# Patient Record
Sex: FEMALE | Race: BLACK OR AFRICAN AMERICAN | NOT HISPANIC OR LATINO | Employment: OTHER | URBAN - METROPOLITAN AREA
[De-identification: names, ages, dates, MRNs, and addresses within clinical notes are randomized per-mention and may not be internally consistent; named-entity substitution may affect disease eponyms.]

---

## 2019-05-28 ENCOUNTER — HOSPITAL ENCOUNTER (EMERGENCY)
Facility: HOSPITAL | Age: 29
Discharge: HOME | End: 2019-05-29
Attending: EMERGENCY MEDICINE
Payer: MEDICAID

## 2019-05-28 DIAGNOSIS — H10.33 ACUTE CONJUNCTIVITIS OF BOTH EYES, UNSPECIFIED ACUTE CONJUNCTIVITIS TYPE: Primary | ICD-10-CM

## 2019-05-28 DIAGNOSIS — H40.9 GLAUCOMA OF BOTH EYES, UNSPECIFIED GLAUCOMA TYPE: ICD-10-CM

## 2019-05-28 PROCEDURE — 25000003 PHARM REV CODE 250: Performed by: EMERGENCY MEDICINE

## 2019-05-28 PROCEDURE — 99283 EMERGENCY DEPT VISIT LOW MDM: CPT

## 2019-05-28 PROCEDURE — 63600175 PHARM REV CODE 636 W HCPCS: Performed by: EMERGENCY MEDICINE

## 2019-05-28 RX ORDER — MIDAZOLAM HYDROCHLORIDE 1 MG/ML
10 INJECTION INTRAMUSCULAR; INTRAVENOUS
Status: DISCONTINUED | OUTPATIENT
Start: 2019-05-28 | End: 2019-05-28

## 2019-05-28 RX ORDER — DIPHENHYDRAMINE HCL 12.5MG/5ML
12.5 ELIXIR ORAL
Status: DISCONTINUED | OUTPATIENT
Start: 2019-05-28 | End: 2019-05-29 | Stop reason: HOSPADM

## 2019-05-28 RX ORDER — MIDAZOLAM HYDROCHLORIDE 1 MG/ML
INJECTION INTRAMUSCULAR; INTRAVENOUS
Status: DISCONTINUED
Start: 2019-05-28 | End: 2019-05-29 | Stop reason: HOSPADM

## 2019-05-28 RX ORDER — DIPHENHYDRAMINE HCL 12.5MG/5ML
50 ELIXIR ORAL ONCE
Status: COMPLETED | OUTPATIENT
Start: 2019-05-28 | End: 2019-05-28

## 2019-05-28 RX ORDER — MIDAZOLAM HYDROCHLORIDE 1 MG/ML
10 INJECTION INTRAMUSCULAR; INTRAVENOUS ONCE
Status: COMPLETED | OUTPATIENT
Start: 2019-05-28 | End: 2019-05-28

## 2019-05-28 RX ADMIN — DIPHENHYDRAMINE HYDROCHLORIDE 50 MG: 12.5 SOLUTION ORAL at 11:05

## 2019-05-28 RX ADMIN — MIDAZOLAM HYDROCHLORIDE 10 MG: 1 INJECTION, SOLUTION INTRAMUSCULAR; INTRAVENOUS at 11:05

## 2019-05-28 RX ADMIN — FLUORESCEIN SODIUM 1 EACH: 1 STRIP OPHTHALMIC at 11:05

## 2019-05-29 VITALS
WEIGHT: 120 LBS | DIASTOLIC BLOOD PRESSURE: 75 MMHG | HEART RATE: 75 BPM | OXYGEN SATURATION: 95 % | RESPIRATION RATE: 20 BRPM | SYSTOLIC BLOOD PRESSURE: 129 MMHG | BODY MASS INDEX: 23.44 KG/M2 | TEMPERATURE: 99 F

## 2019-05-29 NOTE — ED PROVIDER NOTES
Encounter Date: 5/28/2019    SCRIBE #1 NOTE: I, Elbert Mccormack , am scribing for, and in the presence of,  Russell ADAIR) . I have scribed the following portions of the note - Other sections scribed: HPI, ROS, PE, ED Management.       History     Chief Complaint   Patient presents with    Facial Swelling     pt from [facility] sent for right facial swelling and redness      Patient is a 28 year old female with a PMHx of Cerebral Palsy, Seizures, and Spina Bifida aperta who presents to the ED via EMS due to facial swelling that began today. Patient was sent from [facility] for the facial swelling and redness. Patient has left eye redness as well. Patient has had similar symptoms in the past and have been associated with allergies. Most recent episode was about one month ago.    Dr. Krishnan from [facility] recommended that the patient be evaluated in the ED. Patients father and EMS serves as historian.     The history is provided by a parent and the EMS personnel. The history is limited by a developmental delay.     Review of patient's allergies indicates:  No Known Allergies  Past Medical History:   Diagnosis Date    ADHD (attention deficit hyperactivity disorder)     Blood transfusion     Cerebral palsy     Congenital hip dislocation     Encephalopathy     Infantile myoclonic epilepsy     Iron deficiency anemia     Mental retardation     Pneumonia, aspiration     Seizures     Spina bifida aperta      Past Surgical History:   Procedure Laterality Date    ABDOMINAL SURGERY      BRAIN SURGERY      GASTROSTOMY      PEG tube    HIP SURGERY      JOINT REPLACEMENT       No family history on file.  Social History     Tobacco Use    Smoking status: Never Smoker   Substance Use Topics    Alcohol use: No    Drug use: No     Review of Systems   Unable to perform ROS: Other   HENT: Positive for facial swelling and sneezing.    Eyes: Positive for redness.   Patient's PMHx of     Physical Exam      Initial Vitals [05/28/19 2209]   BP Pulse Resp Temp SpO2   98/62 98 20 98.2 °F (36.8 °C) 96 %      MAP       --         Physical Exam    Nursing note and vitals reviewed.  Constitutional: She is not diaphoretic. No distress.   HENT:   Head: Normocephalic and atraumatic.   Right Ear: External ear normal.   Left Ear: External ear normal.   Mouth/Throat: Oropharynx is clear and moist.   Eyes: EOM are normal. Pupils are equal, round, and reactive to light. Right eye exhibits no discharge. Left eye exhibits no discharge. No scleral icterus.   Bilateral conjunctival injection appreciated with limbic sparing   Neck: Normal range of motion.   Cardiovascular: Normal rate and regular rhythm.   No murmur heard.  Abdominal: Soft. Bowel sounds are normal.   Musculoskeletal: Normal range of motion. She exhibits no edema.   Neurological: She is alert.         ED Course   Procedures  Labs Reviewed - No data to display       Imaging Results          CT Maxillofacial Without Contrast (In process)                  Medical Decision Making:   History:   Old Medical Records: I decided to obtain old medical records.  Initial Assessment:   28-year-old female with history of severe MR presenting with left eye redness, bilateral conjunctiva injection, swelling of his left eye.  Patient noted to be continually rubbing her nose.  Some concern for allergies on the patient's parents.  Patient sedated to further for my exam with intranasal Versed.  Monitored without significant evidence of desaturation.  No evidence of corneal abrasion in either eye.  Pressures with Arturo-Pen reveals slightly elevated pressures, 22 and right, 25 and left.  Unclear significance given that patient was mildly uncooperative with exam even under sedation.  Differential includes allergic conjunctivitis, versus viral conjunctivitis.  I have less of a concern for glaucoma, though pressures are noted to be elevated.  Discussed need to follow up with Ophthalmology in 1  week.  CT scan negative, doubt significant cellulitis.  Trauma also a possibility given that the patient continually rubs nose and baseline discoordination may make her rub her eyes as well. Otherwise believe she is safe for discharge home.  Discussed need to follow up with Ophthalmology as well as strict return precautions.  Discussed use of Zyrtec to help with symptoms.    Prescription for Zyrtec and erythromycin ointment faxed to nursing home as it was not printed prior to her discharge.  ED Management:  22:21: Will prescribe Zyrtec and Benadryl for the patients allergies. Will assess the patients left eye for corneal abrasions.            Scribe Attestation:   Scribe #1: I performed the above scribed service and the documentation accurately describes the services I performed. I attest to the accuracy of the note.               Clinical Impression:   No diagnosis found.                             Russell Starr MD  05/29/19 8014

## 2019-05-29 NOTE — DISCHARGE INSTRUCTIONS
You were seen in the emergency department for eye redness and swelling. Your exam and CT scan is reassuring.  This could be caused by many things including allergies.  In addition, your eye pressure was noted to be somewhat elevated.  The lowest pressure was 22 in your right eye and 25 in your left eye.  It is very important you see an eye doctor within 1 week.  Please call the Ophthalmology number on this sheet to schedule appointment.  Please return for any new or worsening pain, swelling, fevers, inability to open her eyes, changes in vision, severe crusting, or you become concerned in any other way.

## 2020-10-11 ENCOUNTER — HOSPITAL ENCOUNTER (INPATIENT)
Facility: HOSPITAL | Age: 30
LOS: 12 days | Discharge: HOME | DRG: 177 | End: 2020-10-23
Attending: EMERGENCY MEDICINE | Admitting: FAMILY MEDICINE
Payer: MEDICAID

## 2020-10-11 DIAGNOSIS — U07.1 PNEUMONIA DUE TO COVID-19 VIRUS: Primary | ICD-10-CM

## 2020-10-11 DIAGNOSIS — J12.82 PNEUMONIA DUE TO COVID-19 VIRUS: Primary | ICD-10-CM

## 2020-10-11 DIAGNOSIS — K94.23 PEG TUBE MALFUNCTION: ICD-10-CM

## 2020-10-11 DIAGNOSIS — E87.0 HYPERNATREMIA: ICD-10-CM

## 2020-10-11 DIAGNOSIS — R78.81 POSITIVE BLOOD CULTURE: ICD-10-CM

## 2020-10-11 DIAGNOSIS — E44.0 MODERATE MALNUTRITION: ICD-10-CM

## 2020-10-11 DIAGNOSIS — R00.0 SINUS TACHYCARDIA: ICD-10-CM

## 2020-10-11 DIAGNOSIS — G80.9 CEREBRAL PALSY: ICD-10-CM

## 2020-10-11 DIAGNOSIS — R09.02 HYPOXIA: ICD-10-CM

## 2020-10-11 DIAGNOSIS — T68.XXXA HYPOTHERMIA, INITIAL ENCOUNTER: ICD-10-CM

## 2020-10-11 DIAGNOSIS — G80.9 CEREBRAL PALSY, UNSPECIFIED TYPE: ICD-10-CM

## 2020-10-11 LAB
ALBUMIN SERPL BCP-MCNC: 2.6 G/DL (ref 3.5–5.2)
ALP SERPL-CCNC: 318 U/L (ref 55–135)
ALT SERPL W/O P-5'-P-CCNC: 146 U/L (ref 10–44)
ANION GAP SERPL CALC-SCNC: 13 MMOL/L (ref 8–16)
AST SERPL-CCNC: 152 U/L (ref 10–40)
BACTERIA #/AREA URNS HPF: NORMAL /HPF
BASOPHILS # BLD AUTO: 0.01 K/UL (ref 0–0.2)
BASOPHILS NFR BLD: 0.2 % (ref 0–1.9)
BILIRUB SERPL-MCNC: 0.2 MG/DL (ref 0.1–1)
BILIRUB UR QL STRIP: NEGATIVE
BUN SERPL-MCNC: 20 MG/DL (ref 6–20)
CALCIUM SERPL-MCNC: 8.7 MG/DL (ref 8.7–10.5)
CHLORIDE SERPL-SCNC: 113 MMOL/L (ref 95–110)
CK SERPL-CCNC: 95 U/L (ref 20–180)
CLARITY UR: CLEAR
CO2 SERPL-SCNC: 26 MMOL/L (ref 23–29)
COLOR UR: YELLOW
CREAT SERPL-MCNC: 0.7 MG/DL (ref 0.5–1.4)
CRP SERPL-MCNC: 182.8 MG/L (ref 0–8.2)
CTP QC/QA: YES
CTP QC/QA: YES
D DIMER PPP IA.FEU-MCNC: 2.1 MG/L FEU
DIFFERENTIAL METHOD: ABNORMAL
EOSINOPHIL # BLD AUTO: 0 K/UL (ref 0–0.5)
EOSINOPHIL NFR BLD: 0 % (ref 0–8)
ERYTHROCYTE [DISTWIDTH] IN BLOOD BY AUTOMATED COUNT: 12.9 % (ref 11.5–14.5)
EST. GFR  (AFRICAN AMERICAN): >60 ML/MIN/1.73 M^2
EST. GFR  (NON AFRICAN AMERICAN): >60 ML/MIN/1.73 M^2
FERRITIN SERPL-MCNC: 1000 NG/ML (ref 20–300)
GLUCOSE SERPL-MCNC: 235 MG/DL (ref 70–110)
GLUCOSE UR QL STRIP: ABNORMAL
HCT VFR BLD AUTO: 33.8 % (ref 37–48.5)
HGB BLD-MCNC: 11 G/DL (ref 12–16)
HGB UR QL STRIP: NEGATIVE
HYALINE CASTS #/AREA URNS LPF: 0 /LPF
IMM GRANULOCYTES # BLD AUTO: 0.02 K/UL (ref 0–0.04)
IMM GRANULOCYTES NFR BLD AUTO: 0.5 % (ref 0–0.5)
KETONES UR QL STRIP: NEGATIVE
LACTATE SERPL-SCNC: 1 MMOL/L (ref 0.5–2.2)
LACTATE SERPL-SCNC: 1.6 MMOL/L (ref 0.5–2.2)
LEUKOCYTE ESTERASE UR QL STRIP: NEGATIVE
LYMPHOCYTES # BLD AUTO: 0.7 K/UL (ref 1–4.8)
LYMPHOCYTES NFR BLD: 15.4 % (ref 18–48)
MCH RBC QN AUTO: 29.9 PG (ref 27–31)
MCHC RBC AUTO-ENTMCNC: 32.5 G/DL (ref 32–36)
MCV RBC AUTO: 92 FL (ref 82–98)
MICROSCOPIC COMMENT: NORMAL
MONOCYTES # BLD AUTO: 0.2 K/UL (ref 0.3–1)
MONOCYTES NFR BLD: 4 % (ref 4–15)
NEUTROPHILS # BLD AUTO: 3.4 K/UL (ref 1.8–7.7)
NEUTROPHILS NFR BLD: 79.9 % (ref 38–73)
NITRITE UR QL STRIP: NEGATIVE
NRBC BLD-RTO: 0 /100 WBC
PH UR STRIP: 6 [PH] (ref 5–8)
PLATELET # BLD AUTO: 122 K/UL (ref 150–350)
PMV BLD AUTO: 12 FL (ref 9.2–12.9)
POC MOLECULAR INFLUENZA A AGN: NEGATIVE
POC MOLECULAR INFLUENZA B AGN: NEGATIVE
POTASSIUM SERPL-SCNC: 4.1 MMOL/L (ref 3.5–5.1)
PROCALCITONIN SERPL IA-MCNC: 0.62 NG/ML
PROT SERPL-MCNC: 7.7 G/DL (ref 6–8.4)
PROT UR QL STRIP: ABNORMAL
RBC # BLD AUTO: 3.68 M/UL (ref 4–5.4)
RBC #/AREA URNS HPF: 2 /HPF (ref 0–4)
SARS-COV-2 RDRP RESP QL NAA+PROBE: POSITIVE
SODIUM SERPL-SCNC: 152 MMOL/L (ref 136–145)
SP GR UR STRIP: 1.01 (ref 1–1.03)
SQUAMOUS #/AREA URNS HPF: NORMAL /HPF
URN SPEC COLLECT METH UR: ABNORMAL
UROBILINOGEN UR STRIP-ACNC: NEGATIVE EU/DL
WBC # BLD AUTO: 4.22 K/UL (ref 3.9–12.7)
WBC #/AREA URNS HPF: 3 /HPF (ref 0–5)
YEAST URNS QL MICRO: NORMAL

## 2020-10-11 PROCEDURE — 99223 PR INITIAL HOSPITAL CARE,LEVL III: ICD-10-PCS | Mod: AI,,, | Performed by: FAMILY MEDICINE

## 2020-10-11 PROCEDURE — 86140 C-REACTIVE PROTEIN: CPT

## 2020-10-11 PROCEDURE — 83605 ASSAY OF LACTIC ACID: CPT

## 2020-10-11 PROCEDURE — 85025 COMPLETE CBC W/AUTO DIFF WBC: CPT

## 2020-10-11 PROCEDURE — 81000 URINALYSIS NONAUTO W/SCOPE: CPT

## 2020-10-11 PROCEDURE — 93005 ELECTROCARDIOGRAM TRACING: CPT

## 2020-10-11 PROCEDURE — 63600175 PHARM REV CODE 636 W HCPCS: Performed by: FAMILY MEDICINE

## 2020-10-11 PROCEDURE — 84145 PROCALCITONIN (PCT): CPT

## 2020-10-11 PROCEDURE — 82550 ASSAY OF CK (CPK): CPT

## 2020-10-11 PROCEDURE — 25000003 PHARM REV CODE 250: Performed by: FAMILY MEDICINE

## 2020-10-11 PROCEDURE — U0002 COVID-19 LAB TEST NON-CDC: HCPCS | Performed by: EMERGENCY MEDICINE

## 2020-10-11 PROCEDURE — 87040 BLOOD CULTURE FOR BACTERIA: CPT | Mod: 59

## 2020-10-11 PROCEDURE — 96365 THER/PROPH/DIAG IV INF INIT: CPT

## 2020-10-11 PROCEDURE — 25000003 PHARM REV CODE 250: Performed by: EMERGENCY MEDICINE

## 2020-10-11 PROCEDURE — 96375 TX/PRO/DX INJ NEW DRUG ADDON: CPT

## 2020-10-11 PROCEDURE — 99285 EMERGENCY DEPT VISIT HI MDM: CPT | Mod: 25

## 2020-10-11 PROCEDURE — 99223 1ST HOSP IP/OBS HIGH 75: CPT | Mod: AI,,, | Performed by: FAMILY MEDICINE

## 2020-10-11 PROCEDURE — 80053 COMPREHEN METABOLIC PANEL: CPT

## 2020-10-11 PROCEDURE — 63600175 PHARM REV CODE 636 W HCPCS: Performed by: EMERGENCY MEDICINE

## 2020-10-11 PROCEDURE — 83605 ASSAY OF LACTIC ACID: CPT | Mod: 91

## 2020-10-11 PROCEDURE — 36415 COLL VENOUS BLD VENIPUNCTURE: CPT

## 2020-10-11 PROCEDURE — 20600001 HC STEP DOWN PRIVATE ROOM

## 2020-10-11 PROCEDURE — 85379 FIBRIN DEGRADATION QUANT: CPT

## 2020-10-11 PROCEDURE — 82728 ASSAY OF FERRITIN: CPT

## 2020-10-11 PROCEDURE — 93010 ELECTROCARDIOGRAM REPORT: CPT | Mod: ,,, | Performed by: INTERNAL MEDICINE

## 2020-10-11 PROCEDURE — 93010 EKG 12-LEAD: ICD-10-PCS | Mod: ,,, | Performed by: INTERNAL MEDICINE

## 2020-10-11 RX ORDER — BACLOFEN 10 MG/1
10 TABLET ORAL 3 TIMES DAILY
COMMUNITY

## 2020-10-11 RX ORDER — DEXAMETHASONE SODIUM PHOSPHATE 4 MG/ML
6 INJECTION, SOLUTION INTRA-ARTICULAR; INTRALESIONAL; INTRAMUSCULAR; INTRAVENOUS; SOFT TISSUE
Status: COMPLETED | OUTPATIENT
Start: 2020-10-11 | End: 2020-10-11

## 2020-10-11 RX ORDER — BACLOFEN 10 MG/1
10 TABLET ORAL 3 TIMES DAILY
Status: DISCONTINUED | OUTPATIENT
Start: 2020-10-11 | End: 2020-10-11

## 2020-10-11 RX ORDER — PANTOPRAZOLE SODIUM 40 MG/1
40 TABLET, DELAYED RELEASE ORAL DAILY
Status: DISCONTINUED | OUTPATIENT
Start: 2020-10-11 | End: 2020-10-11

## 2020-10-11 RX ORDER — ACETAMINOPHEN 650 MG/20.3ML
1000 LIQUID ORAL
Status: COMPLETED | OUTPATIENT
Start: 2020-10-11 | End: 2020-10-11

## 2020-10-11 RX ORDER — DIAZEPAM 10 MG/2G
20 GEL RECTAL
Status: DISCONTINUED | OUTPATIENT
Start: 2020-10-11 | End: 2020-10-23 | Stop reason: HOSPADM

## 2020-10-11 RX ORDER — TALC
3 POWDER (GRAM) TOPICAL NIGHTLY PRN
Status: DISCONTINUED | OUTPATIENT
Start: 2020-10-11 | End: 2020-10-23 | Stop reason: HOSPADM

## 2020-10-11 RX ORDER — SODIUM CHLORIDE 0.9 % (FLUSH) 0.9 %
10 SYRINGE (ML) INJECTION
Status: DISCONTINUED | OUTPATIENT
Start: 2020-10-11 | End: 2020-10-23 | Stop reason: HOSPADM

## 2020-10-11 RX ORDER — GLUCAGON 1 MG
1 KIT INJECTION
Status: DISCONTINUED | OUTPATIENT
Start: 2020-10-11 | End: 2020-10-23 | Stop reason: HOSPADM

## 2020-10-11 RX ORDER — LACTULOSE 10 G/15ML
200 SOLUTION ORAL; RECTAL 2 TIMES DAILY PRN
Status: DISCONTINUED | OUTPATIENT
Start: 2020-10-11 | End: 2020-10-23 | Stop reason: HOSPADM

## 2020-10-11 RX ORDER — CEFTRIAXONE 1 G/1
1 INJECTION, POWDER, FOR SOLUTION INTRAMUSCULAR; INTRAVENOUS
Status: COMPLETED | OUTPATIENT
Start: 2020-10-12 | End: 2020-10-14

## 2020-10-11 RX ORDER — ENOXAPARIN SODIUM 100 MG/ML
40 INJECTION SUBCUTANEOUS EVERY 24 HOURS
Status: DISCONTINUED | OUTPATIENT
Start: 2020-10-11 | End: 2020-10-23 | Stop reason: HOSPADM

## 2020-10-11 RX ORDER — LEVETIRACETAM 100 MG/ML
20 SOLUTION ORAL 2 TIMES DAILY
Status: DISCONTINUED | OUTPATIENT
Start: 2020-10-11 | End: 2020-10-23 | Stop reason: HOSPADM

## 2020-10-11 RX ORDER — LORATADINE 10 MG/1
10 TABLET ORAL DAILY
COMMUNITY

## 2020-10-11 RX ORDER — TALC
3 POWDER (GRAM) TOPICAL NIGHTLY PRN
Status: DISCONTINUED | OUTPATIENT
Start: 2020-10-11 | End: 2020-10-11

## 2020-10-11 RX ORDER — LEVETIRACETAM 100 MG/ML
20 SOLUTION ORAL 2 TIMES DAILY
Status: DISCONTINUED | OUTPATIENT
Start: 2020-10-11 | End: 2020-10-11

## 2020-10-11 RX ORDER — LACTULOSE 10 G/15ML
200 SOLUTION ORAL; RECTAL 3 TIMES DAILY
Status: DISCONTINUED | OUTPATIENT
Start: 2020-10-11 | End: 2020-10-11

## 2020-10-11 RX ORDER — DEXAMETHASONE SODIUM PHOSPHATE 4 MG/ML
6 INJECTION, SOLUTION INTRA-ARTICULAR; INTRALESIONAL; INTRAMUSCULAR; INTRAVENOUS; SOFT TISSUE EVERY 24 HOURS
Status: COMPLETED | OUTPATIENT
Start: 2020-10-12 | End: 2020-10-20

## 2020-10-11 RX ADMIN — AZITHROMYCIN MONOHYDRATE 500 MG: 500 INJECTION, POWDER, LYOPHILIZED, FOR SOLUTION INTRAVENOUS at 09:10

## 2020-10-11 RX ADMIN — ACETAMINOPHEN ORAL SOLUTION 999.01 MG: 650 SOLUTION ORAL at 07:10

## 2020-10-11 RX ADMIN — BACLOFEN 10 MG: 10 TABLET ORAL at 09:10

## 2020-10-11 RX ADMIN — LEVETIRACETAM 1088 MG: 100 SOLUTION ORAL at 09:10

## 2020-10-11 RX ADMIN — DEXAMETHASONE SODIUM PHOSPHATE 6 MG: 4 INJECTION INTRA-ARTICULAR; INTRALESIONAL; INTRAMUSCULAR; INTRAVENOUS; SOFT TISSUE at 09:10

## 2020-10-11 RX ADMIN — ENOXAPARIN SODIUM 40 MG: 40 INJECTION SUBCUTANEOUS at 04:10

## 2020-10-11 RX ADMIN — BACLOFEN 10 MG: 10 TABLET ORAL at 04:10

## 2020-10-11 RX ADMIN — REMDESIVIR 200 MG: 100 INJECTION, POWDER, LYOPHILIZED, FOR SOLUTION INTRAVENOUS at 06:10

## 2020-10-11 RX ADMIN — CEFTRIAXONE 1 G: 1 INJECTION, SOLUTION INTRAVENOUS at 07:10

## 2020-10-11 RX ADMIN — SODIUM CHLORIDE 1000 ML: 9 INJECTION, SOLUTION INTRAVENOUS at 07:10

## 2020-10-11 NOTE — H&P
Hospital Medicine  History and Physical  [facility]      Patient Name: Alexander Sainz  MRN:  [id number]  Hospital Medicine Team: [facility] R Augie Calvo MD  Date of Admission:  10/11/2020     Length of Stay:  LOS: 0 days     Principal Problem: Covid-19 Virus Infection    Chief complaint    Sob, fever    HPI    30yoF with severe MR, cerebral palsy, spina bifida, chronic transaminitis, chronic hypernatremia, lives at [facility], who presented to  ED with fever 102 at home, and hypoxemic resp failure with sats in the field 88%.  In ED, temp 99, Tachy 119, O2 sats 82% on RA and improved to 90s on 2-3L.  CXR with fairly dense infiltrate in RML - appears bacterial in nature.  Was Tx-ed for bacterial PNA with Rocephin and Azithro. Covid +  and called for transfer to [facility].  One dose of IV Dexa.    10/11 PM: seen in room once made it to covid floor. Father able to be in room s/t new visitor policy however father states he has not seen his daughter since around 2/2020 (?pandemic precautions) as she lives at [facility] and he is unsure of nutrition requirements through her PEG tube. Does not take anything po. Restraints placed as risk of pulling at tubes and oxygen.    Review of Systems    Unable to perform s/t patient's baseline mental status.    Past Medical History:   Diagnosis Date    ADHD (attention deficit hyperactivity disorder)     Blood transfusion     Cerebral palsy     Congenital hip dislocation     Encephalopathy     Infantile myoclonic epilepsy     Iron deficiency anemia     Mental retardation     Pneumonia, aspiration     Seizures     Spina bifida aperta      Past Surgical History:   Procedure Laterality Date    ABDOMINAL SURGERY      BRAIN SURGERY      GASTROSTOMY      PEG tube    HIP SURGERY      JOINT REPLACEMENT       History reviewed. No pertinent family history.  Social History     Socioeconomic History    Marital status: Single     Spouse name: Not on  file    Number of children: Not on file    Years of education: Not on file    Highest education level: Not on file   Occupational History    Not on file   Social Needs    Financial resource strain: Not on file    Food insecurity     Worry: Not on file     Inability: Not on file    Transportation needs     Medical: Not on file     Non-medical: Not on file   Tobacco Use    Smoking status: Never Smoker    Smokeless tobacco: Never Used   Substance and Sexual Activity    Alcohol use: No    Drug use: No    Sexual activity: Never   Lifestyle    Physical activity     Days per week: Not on file     Minutes per session: Not on file    Stress: Not on file   Relationships    Social connections     Talks on phone: Not on file     Gets together: Not on file     Attends [employer] service: Not on file     Active member of club or organization: Not on file     Attends meetings of clubs or organizations: Not on file     Relationship status: Not on file   Other Topics Concern    Not on file   Social History Narrative    Not on file       Medications  No current facility-administered medications on file prior to encounter.      Current Outpatient Medications on File Prior to Encounter   Medication Sig Dispense Refill    baclofen (LIORESAL) 10 MG tablet Take 10 mg by mouth 3 (three) times daily.      calcium-vitamin D (CALCIUM-VITAMIN D) 500 mg(1,250mg) -200 unit per tablet Take 1 tablet by mouth 2 (two) times daily with meals.        diazepam 20 mg Kit Place rectally. Use prn for seizures       fluticasone (FLONASE) 50 mcg/actuation nasal spray 1 spray by Each Nare route once daily.      hydroxypropyl methylcellulose (ISOPTO TEARS) 2.5 % ophthalmic solution 1 drop as needed.        lactulose (CHRONULAC) 10 gram/15 mL solution Take by mouth 3 (three) times daily.        levetiracetam (KEPPRA) 100 mg/mL Soln Take 20 mg/kg by mouth 2 (two) times daily.        loratadine (CLARITIN) 10 mg tablet Take 10 mg by mouth  once daily.      melatonin 3 mg Tab Take by mouth nightly as needed.        montelukast (SINGULAIR) 10 mg tablet Take 10 mg by mouth every evening.      mv-min-FA-Dn-Ii-ktcfncf-lutein (MULTIVITAL) 0.4-162-18 mg Tab Take by mouth.        pantoprazole (PROTONIX) 40 MG tablet Take 40 mg by mouth once daily.        trypsin-balsam-castor oil (VASOLEX) 90- unit-mg-mg/gram Oint Apply topically once daily. Apply daily to periwound as directed         Allergies  Patient has no known allergies.    Physical Examination  Temp:  [99 °F (37.2 °C)-101.4 °F (38.6 °C)]   Pulse:  []   Resp:  [14-50]   BP: (103-135)/(56-75)   SpO2:  [88 %-95 %]     Gen: NAD, NC in place  CV: RRR  Resp: coarse bilateral breath sounds  GI: PEG in place    Laboratory:  Recent Labs   Lab 10/11/20  0735   WBC 4.22   LYMPH 15.4*  0.7*   HGB 11.0*   HCT 33.8*   *     Recent Labs   Lab 10/11/20  0735   *   K 4.1   *   CO2 26   BUN 20   CREATININE 0.7   *   CALCIUM 8.7     Recent Labs   Lab 10/11/20  0735   ALKPHOS 318*   *   *   ALBUMIN 2.6*   PROT 7.7   BILITOT 0.2      Recent Labs     10/11/20  0735 10/11/20  0918   DDIMER  --  2.10*   FERRITIN  --  1,000*   CRP  --  182.8*   LACTATE 1.6  --        All labs within the last 24 hours were reviewed.     Microbiology:  Lab Results   Component Value Date    [id number] Positive (A) 10/11/2020       Microbiology Results (last 7 days)     Procedure Component Value Units Date/Time    Culture, Respiratory with Gram Stain [[id number]]     Order Status: No result Specimen: Respiratory from Endotracheal Aspirate     Blood culture x two cultures. Draw prior to antibiotics. [[id number]] Collected: 10/11/20 0815    Order Status: Sent Specimen: Blood from Peripheral, Antecubital, Right Updated: 10/11/20 0831    Blood culture x two cultures. Draw prior to antibiotics. [[id number]] Collected: 10/11/20 0735    Order Status: Sent Specimen: Blood from Peripheral, Hand,  Left Updated: 10/11/20 0749            Imaging      No results found for this or any previous visit.    X-Ray Chest AP Portable  Narrative: EXAMINATION:  XR CHEST AP PORTABLE    CLINICAL HISTORY:  Sepsis;    TECHNIQUE:  Single frontal view of the chest was performed.    COMPARISON:  06/09/2015    FINDINGS:  There is patchy mixed opacity with more focal airspace consolidation in the right mid and lower lung zone with patchy ground-glass opacity in the lingula.  Findings are concerning for multilobar pneumonia with aspiration also in the differential.  Heart size is normal.  Skeletal structures are intact.  Impression: As above.    Electronically signed by: Yu Bejarano MD  Date:    10/11/2020  Time:    07:56      All imaging within the last 24 hours was reviewed.       Assessment and Plan:    Active Hospital Problems    Diagnosis  POA    Pneumonia due to COVID-19 virus [U07.1, J12.89]  Yes      Resolved Hospital Problems   No resolved problems to display.       COVID-19 Virus Infection  Viral Pneumonia due to COVID-19  - COVID-19 testing: 10/11  - started abx and dexa, monitor glucose. Patient's father is considering remdesivir, wants to read documentation and will be in touch with staff about his decision.  - Isolation: Airborne/Droplet. Surgical mask on patient. Notify Infection Control  - Diagnostics: Trend Q48hrs if stable, more frequently if patient decompensating     Laboratory/Study Frequency Result   CBC Admit & Q48h Hgb 11, Plt 122   CMP Admit & Q48h Na 152, LFTs elevated   Magnesium level Admit    D-dimer Admit & Q48h 2.1   Ferritin Admit & Q48h 1000   CRP Admit & Q48h 182   CPK Admit & Q24h if elevated 95   LDH Admit & Q48h    Vitamin D Admit    BNP Admit    Troponin Admit & Q6h if elevated    Glucose-6-phos dehydrogenase Admit    Procalcitonin Admit 0.62   Lipid Panel If starting statin    Rapid influenza Admit neg   Resp Infection Panel Admit if BMT/organ transplant    Legionella Antigen Admit   "  Sputum Culture Admit    Blood Culture Admit drawn   Urinalysis & Culture Admit    ECG Admit & Q48h if on HCQ    CXR Admit    Lymphopenia, hyponatremia, hyperferritinemia, elevated troponin, elevated d-dimer, age, and medical comorbidities are significant predictors of poor clinical outcome    - Management: Per [facility] COVID Treatment Protocol (4/15/20)    - Monitoring:   - Telemetry & Continuous Pulse Oximetry    - Nutrition:    - Multivitamin PO daily   - Add Boost supplement   - Vitamin D 1000IU daily if deficient   - Ascorbic acid 500mg PO bid    - Supportive Care:   - acetaminophen 650mg PO Q6hr PRN fever/headache   - loperamide PRN viral diarrhea   - IVF if indicated, restrictive strategy preferred, no maintenance IV if able   - VTE PPx: enoxaparin or heparin SQ unless contraindicated    - Antibiotics:  - if indications, CXR findings, elevated procal. See protocol for alternatives.   - Discontinue early if low concern for bacterial co-infection   - ceftriaxone 1g Q24h x 5 days  - azithromycin 500mg po x1, then 250mg po daily x 4 days    - Investigational Therapies:   - If patient meets criteria    Acute Hypoxemic Respiratory Failure  - RT consult via Respiratory Communication for COVID Protocols  - Incentive Spirometer Q4h  - Flutter Valve Q4h  - Continuous Pulse Oximetry  - Goal SpO2 92-96%  - Supplemental O2 via LFNC, VentiMask, or HFNC (see Respiratory Support Oxygen Therapies)  - If wheezing, albuterol INH Q6h scheduled & PRN  - Proning Protocol if patient is a candidate (see  Proning Protocol)   - GCS >13, able to self-prone  - If deterioration, may warrant trial of NIPPV and transfer to [facility] pressure room or immediate ICU consult    Home meds restarted per PEG  Nutrition consult to evaluate nutritional needs thru PEG           If patient transitions to Comfort-Focused Care, please place "Nurse Communication: End of Life Care, family members allowed to visit, including spouse/partner and adult children " [please list names]. Please ask family to visit as a group and leave as a group.         VTE High Risk Prophylaxis:   VTE Risk Mitigation (From admission, onward)    None            High Risk Conditions:  Patient has a condition that poses threat to life and bodily function: Severe Respiratory Distress      Augie Calvo MD  [facility]  Spectra: [phone]

## 2020-10-11 NOTE — PLAN OF CARE
Remdesivir FDA EUA Verbal Consent  The patient or parent/caregiver has been provided with the remdesivir Fact Sheet for Patients and Parents/Caregivers and has been counseled that the FDA has authorized the emergency use of remdesivir, which is not an FDA approved drug. The significant known and potential risks and benefits are unknown. The patient or parent/caregiver has been given the option to accept or refuse and has verbally agreed to receive remdesivir. Daily labs will be ordered and monitoring for Serious Adverse Events will be performed.

## 2020-10-11 NOTE — NURSING
Pt arrived to unit @ 1130 with 3LNC in place.  Unable to orient pt to unit d/t cognitive impairment.  Pt is restless and attempting to remove IV and NC. MD notified that pt is pulling at tubes.  Peg tube in place.  Vitals obtained.  Will continue to monitor.

## 2020-10-11 NOTE — ED PROVIDER NOTES
Encounter Date: 10/11/2020    SCRIBE #1 NOTE: I, Keshia Roblero, am scribing for, and in the presence of,  Lobo Gregorio MD. I have scribed the following portions of the note - Other sections scribed: HPI, ROS.       History     Chief Complaint   Patient presents with    COVID-19 Concerns     pt diagnosed with covid friday; special needs lives at [facility] - staff report O2 sat at 88% on RA - refuses to wear oxygen and temp of 102 prior to tylenol at 0530. received tylenol at 0600 - temp now 99     CC: Covid-19 concerns    HPI: This is a 30 y.o. female with a history of cerebral palsy who presents today via EMS from [facility] with covid-19 concerns. Patient was diagnosed with covid-19 two days ago. Nursing home staff report a fever of 102 this morning that was alleviated with Tylenol about two hours prior to arrival. EMS reports oxygen saturation of 88% but deny shortness of breath or cough. Patient refuses nasal cannula. She is not a smoker and has no known drug allergies.    The history is provided by the nursing home and the EMS personnel. The history is limited by a developmental delay. No  was used.     Review of patient's allergies indicates:  No Known Allergies  Past Medical History:   Diagnosis Date    ADHD (attention deficit hyperactivity disorder)     Blood transfusion     Cerebral palsy     Congenital hip dislocation     Encephalopathy     Infantile myoclonic epilepsy     Iron deficiency anemia     Mental retardation     Pneumonia, aspiration     Seizures     Spina bifida aperta      Past Surgical History:   Procedure Laterality Date    ABDOMINAL SURGERY      BRAIN SURGERY      GASTROSTOMY      PEG tube    HIP SURGERY      JOINT REPLACEMENT       History reviewed. No pertinent family history.  Social History     Tobacco Use    Smoking status: Never Smoker    Smokeless tobacco: Never Used   Substance Use Topics    Alcohol use: No    Drug use: No     Review  of Systems   Constitutional: Positive for fever.   HENT: Negative for congestion and rhinorrhea.    Eyes: Positive for redness.   Respiratory: Negative for cough and shortness of breath.    Cardiovascular: Negative for leg swelling.   Gastrointestinal: Negative for diarrhea and vomiting.   Genitourinary: Negative for frequency and hematuria.   Musculoskeletal: Negative for joint swelling.   Skin: Negative for rash.   Neurological: Negative for seizures.       Physical Exam     Initial Vitals   BP Pulse Resp Temp SpO2   10/11/20 0707 10/11/20 0707 10/11/20 0707 10/11/20 0707 10/11/20 0722   135/74 (!) 119 (!) 30 99 °F (37.2 °C) (!) 89 %      MAP       --                Physical Exam  The patient was examined specifically for the following:   General:No significant distress, Good color, Warm and dry. Head and neck:Scalp atraumatic, Neck supple. Neurological:Appropriate conversation, Gross motor deficits. Eyes:Conjugate gaze, Clear corneas. ENT: No epistaxis. Cardiac: Regular rate and rhythm, Grossly normal heart tones. Pulmonary: Wheezing, Rales. Gastrointestinal: Abdominal tenderness, Abdominal distention. Musculoskeletal: Extremity deformity, Apparent pain with range of motion of the joints. Skin: Rash.   The findings on examination were normal except for the following:  The patient has injected conjunctiva.  She is moaning and waving her arms in the air.  She is not conversational.  She immediately removed her oxygen cannula.  The lungs are clear.  The patient has regular tachycardia.  The abdomen is soft.  The patient has a short left lower extremity.  Patient has muscular wasting in the lower extremities.  The abdomen is soft.  A PEG tube is in place.  The conjunctiva are injected.   ED Course   Critical Care    Date/Time: 10/11/2020 9:20 AM  Performed by: Lobo Gregorio MD  Authorized by: Lobo Gregorio MD   Direct patient critical care time: 22 minutes  Additional history critical care time: 11  minutes  Ordering / reviewing critical care time: 11 minutes  Documentation critical care time: 11 minutes  Consulting other physicians critical care time: 4 minutes  Consult with family critical care time: 3 minutes  Total critical care time (exclusive of procedural time) : 62 minutes  Critical care time was exclusive of separately billable procedures and treating other patients and teaching time.  Critical care was necessary to treat or prevent imminent or life-threatening deterioration of the following conditions: respiratory failure.  Critical care was time spent personally by me on the following activities: development of treatment plan with patient or surrogate, discussions with primary provider, evaluation of patient's response to treatment, examination of patient, obtaining history from patient or surrogate, ordering and performing treatments and interventions, ordering and review of laboratory studies, pulse oximetry, ordering and review of radiographic studies, re-evaluation of patient's condition and review of old charts.        Labs Reviewed   CBC W/ AUTO DIFFERENTIAL - Abnormal; Notable for the following components:       Result Value    RBC 3.68 (*)     Hemoglobin 11.0 (*)     Hematocrit 33.8 (*)     Platelets 122 (*)     Lymph # 0.7 (*)     Mono # 0.2 (*)     Gran% 79.9 (*)     Lymph% 15.4 (*)     All other components within normal limits   COMPREHENSIVE METABOLIC PANEL - Abnormal; Notable for the following components:    Sodium 152 (*)     Chloride 113 (*)     Glucose 235 (*)     Albumin 2.6 (*)     Alkaline Phosphatase 318 (*)      (*)      (*)     All other components within normal limits   URINALYSIS, REFLEX TO URINE CULTURE - Abnormal; Notable for the following components:    Protein, UA 1+ (*)     Glucose, UA 3+ (*)     All other components within normal limits    Narrative:     Specimen Source->Urine   PROCALCITONIN - Abnormal; Notable for the following components:     Procalcitonin 0.62 (*)     All other components within normal limits   FERRITIN - Abnormal; Notable for the following components:    Ferritin 1,000 (*)     All other components within normal limits   D DIMER, QUANTITATIVE - Abnormal; Notable for the following components:    D-Dimer 2.10 (*)     All other components within normal limits   C-REACTIVE PROTEIN - Abnormal; Notable for the following components:    .8 (*)     All other components within normal limits   SARS-COV-2 RDRP GENE - Abnormal; Notable for the following components:    POC Rapid COVID Positive (*)     All other components within normal limits   CULTURE, BLOOD   CULTURE, BLOOD   CULTURE, RESPIRATORY   LACTIC ACID, PLASMA   URINALYSIS MICROSCOPIC    Narrative:     Specimen Source->Urine   CK   LACTIC ACID, PLASMA   POCT INFLUENZA A/B MOLECULAR     EKG Readings: (Independently Interpreted)   This patient is in a sinus tachycardia with a heart rate of 120.  There are nonspecific ST segment and T-wave changes.  There is no definite evidence of acute myocardial infarction or malignant arrhythmia.       Imaging Results          X-Ray Chest AP Portable (Final result)  Result time 10/11/20 07:56:12    Final result by Yu Bejarano MD (10/11/20 07:56:12)                 Impression:      As above.      Electronically signed by: Yu Bejarano MD  Date:    10/11/2020  Time:    07:56             Narrative:    EXAMINATION:  XR CHEST AP PORTABLE    CLINICAL HISTORY:  Sepsis;    TECHNIQUE:  Single frontal view of the chest was performed.    COMPARISON:  06/09/2015    FINDINGS:  There is patchy mixed opacity with more focal airspace consolidation in the right mid and lower lung zone with patchy ground-glass opacity in the lingula.  Findings are concerning for multilobar pneumonia with aspiration also in the differential.  Heart size is normal.  Skeletal structures are intact.                              Medical decision making:  Given the above this  patient presents to the emergency room with hypoxia.  The patient has an infiltrate in the right middle lobe.  The procalcitonin is elevated white blood count is normal.  COVID testing is positive.  The patient had an arrival saturation of 82% on room air.  I will admit to [facility].                 Scribe Attestation:   Scribe #1: I performed the above scribed service and the documentation accurately describes the services I performed. I attest to the accuracy of the note.                      Clinical Impression:     ICD-10-CM ICD-9-CM   1. Pneumonia due to COVID-19 virus  U07.1 480.8    J12.89    2. Sinus tachycardia  R00.0 427.89   3. Hypoxia  R09.02 799.02                   1. Pneumonia due to COVID-19 virus    2. Sinus tachycardia    3. Hypoxia            ED Disposition Condition    Transfer to Another Facility Stable           I personally performed the services described in this documentation.  All medical record  entries made by the scribe are at my direction and in my presence.  Signed, Dr. Darline Gregorio MD  10/11/20 0921       Lobo Gregorio MD  10/11/20 [id number]

## 2020-10-11 NOTE — NURSING
Father arrived at bedside to sit with pt.  Father states that right arm must be restrained due to always pulling at tubes while in the hospital.  MD aware of the request of the family and aware that pt attempted to pull IV out with her teeth and removing nasal cannula.

## 2020-10-11 NOTE — CARE UPDATE
Rapid Response Nurse Chart Check     Chart check completed, abnormal VS noted. Please  call [phone] for further concerns or assistance.

## 2020-10-11 NOTE — ED TRIAGE NOTES
Patient presents to the ER with a fever and oxygen saturation of 82%. Suspected COVID. Denies nausea, vomiting, diarrhea at this time. Patient is nonverbal.

## 2020-10-11 NOTE — PLAN OF CARE
(Physician in Lead of Transfers)   Outside Transfer Acceptance Note / Regional Referral Center    Transferring Physician: Darline CARRASQUILLO (ED)    Accepting Physician: eRshma Dunbar MD    Date of Acceptance: 10/11/2020    Transferring Facility:  ED    Reason for Transfer: COVID    Report from Transferring Physician/Hospital course: 30F with severe MR, cerebral palsy, spina bifida, chronic transaminitis, chronic hypernatremia, lives at [facility], who presented to  ED with fever 102 at home, and hypoxemic resp failure with sats in the field 88%i.  In ED, temp 99, Tachy 119, O2 sats 82% on RA and improved to 90s on 2-3L.  CXR with fairly dense infiltrate in RML - appears bacterial in nature.  Was Tx-ed for bacterial PNA with Rocephin, and about to get Azithro.  Then rapid COVID returned positive, so transfer center called for transfer to [facility].  Now about to get DEx 6 IV.     Hypernatremia (chronic) 152  Procal 0.62  Lactate 1.6  WBC 4K with lymphopenia   AST/ and 146; . TB 0.2.  Prior CMP in 2095, no labs in our system in the interim, with AST 82, , , TB 0.1  UA- no infection  CRP, ferritin, D Dimer, LDH, CPK, trop, BCx x 2- pending collection    VS: see above and Epic    Labs: see above and Epic    Radiographs: see above    To Do List:   1. COVID infection with acute resp failure. COVID protocols Of note, per recent modification to COVID unit visitor policy, caregiver may visit from 8a-6p, and may stay overnight as well given the patient has mental health special needs  2. Bacterial PNA - CTX/Azithro. F/u BCx    Upon patient arrival to floor, please send SecureChat to [facility] or call extension [phone] (if no answer, this will flip to a beeper, so enter your call back number) for Hospital Medicine admit team assignment and for additional admit orders for the patient.  Do not page the attending physician associated with the patient on arrival (this physician may not be on duty at the  time of arrival).  Rather, always call [phone] to reach the triage physician for orders and team assignment.    Reshma Dunbar MD  Hospital Medicine Staff  Cell: [phone]

## 2020-10-11 NOTE — PLAN OF CARE
Problem: Fall Injury Risk  Goal: Absence of Fall and Fall-Related Injury  Outcome: Ongoing, Progressing     Problem: Restraint, Nonbehavioral (Nonviolent)  Goal: Discontinuation Criteria Achieved  Outcome: Ongoing, Progressing  Goal: Personal Dignity and Safety Maintained  Outcome: Ongoing, Progressing     Problem: Adult Inpatient Plan of Care  Goal: Plan of Care Review  Outcome: Ongoing, Progressing  Goal: Patient-Specific Goal (Individualization)  Outcome: Ongoing, Progressing  Goal: Absence of Hospital-Acquired Illness or Injury  Outcome: Ongoing, Progressing  Goal: Optimal Comfort and Wellbeing  Outcome: Ongoing, Progressing  Goal: Readiness for Transition of Care  Outcome: Ongoing, Progressing  Goal: Rounds/Family Conference  Outcome: Ongoing, Progressing     Problem: Wound  Goal: Optimal Wound Healing  Outcome: Ongoing, Progressing     Problem: Skin Injury Risk Increased  Goal: Skin Health and Integrity  Outcome: Ongoing, Progressing

## 2020-10-12 LAB
ALBUMIN SERPL BCP-MCNC: 2.2 G/DL (ref 3.5–5.2)
ALP SERPL-CCNC: 265 U/L (ref 55–135)
ALT SERPL W/O P-5'-P-CCNC: 115 U/L (ref 10–44)
ANION GAP SERPL CALC-SCNC: 12 MMOL/L (ref 8–16)
ANION GAP SERPL CALC-SCNC: 6 MMOL/L (ref 8–16)
ANISOCYTOSIS BLD QL SMEAR: SLIGHT
AST SERPL-CCNC: 91 U/L (ref 10–40)
BASOPHILS # BLD AUTO: 0.01 K/UL (ref 0–0.2)
BASOPHILS NFR BLD: 0.2 % (ref 0–1.9)
BILIRUB SERPL-MCNC: 0.2 MG/DL (ref 0.1–1)
BNP SERPL-MCNC: 33 PG/ML (ref 0–99)
BUN SERPL-MCNC: 15 MG/DL (ref 6–20)
BUN SERPL-MCNC: 16 MG/DL (ref 6–20)
CALCIUM SERPL-MCNC: 8.8 MG/DL (ref 8.7–10.5)
CALCIUM SERPL-MCNC: 9 MG/DL (ref 8.7–10.5)
CHLORIDE SERPL-SCNC: 113 MMOL/L (ref 95–110)
CHLORIDE SERPL-SCNC: 116 MMOL/L (ref 95–110)
CO2 SERPL-SCNC: 26 MMOL/L (ref 23–29)
CO2 SERPL-SCNC: 29 MMOL/L (ref 23–29)
CREAT SERPL-MCNC: 0.5 MG/DL (ref 0.5–1.4)
CREAT SERPL-MCNC: 0.5 MG/DL (ref 0.5–1.4)
DIFFERENTIAL METHOD: ABNORMAL
EOSINOPHIL # BLD AUTO: 0 K/UL (ref 0–0.5)
EOSINOPHIL NFR BLD: 0 % (ref 0–8)
ERYTHROCYTE [DISTWIDTH] IN BLOOD BY AUTOMATED COUNT: 13.5 % (ref 11.5–14.5)
ERYTHROCYTE [SEDIMENTATION RATE] IN BLOOD BY WESTERGREN METHOD: >120 MM/HR (ref 0–36)
EST. GFR  (AFRICAN AMERICAN): >60 ML/MIN/1.73 M^2
EST. GFR  (AFRICAN AMERICAN): >60 ML/MIN/1.73 M^2
EST. GFR  (NON AFRICAN AMERICAN): >60 ML/MIN/1.73 M^2
EST. GFR  (NON AFRICAN AMERICAN): >60 ML/MIN/1.73 M^2
ESTIMATED AVG GLUCOSE: 105 MG/DL (ref 68–131)
GLUCOSE SERPL-MCNC: 100 MG/DL (ref 70–110)
GLUCOSE SERPL-MCNC: 153 MG/DL (ref 70–110)
HBA1C MFR BLD HPLC: 5.3 % (ref 4–5.6)
HCT VFR BLD AUTO: 33.3 % (ref 37–48.5)
HGB BLD-MCNC: 10.3 G/DL (ref 12–16)
HYPOCHROMIA BLD QL SMEAR: ABNORMAL
IMM GRANULOCYTES # BLD AUTO: 0.02 K/UL (ref 0–0.04)
IMM GRANULOCYTES NFR BLD AUTO: 0.5 % (ref 0–0.5)
LDH SERPL L TO P-CCNC: 512 U/L (ref 110–260)
LYMPHOCYTES # BLD AUTO: 1 K/UL (ref 1–4.8)
LYMPHOCYTES NFR BLD: 23.1 % (ref 18–48)
MAGNESIUM SERPL-MCNC: 2 MG/DL (ref 1.6–2.6)
MCH RBC QN AUTO: 29.9 PG (ref 27–31)
MCHC RBC AUTO-ENTMCNC: 30.9 G/DL (ref 32–36)
MCV RBC AUTO: 97 FL (ref 82–98)
MONOCYTES # BLD AUTO: 0.2 K/UL (ref 0.3–1)
MONOCYTES NFR BLD: 4.1 % (ref 4–15)
NEUTROPHILS # BLD AUTO: 3.2 K/UL (ref 1.8–7.7)
NEUTROPHILS NFR BLD: 72.1 % (ref 38–73)
NRBC BLD-RTO: 0 /100 WBC
OVALOCYTES BLD QL SMEAR: ABNORMAL
PHOSPHATE SERPL-MCNC: 3.3 MG/DL (ref 2.7–4.5)
PLATELET # BLD AUTO: 112 K/UL (ref 150–350)
PMV BLD AUTO: 12.5 FL (ref 9.2–12.9)
POIKILOCYTOSIS BLD QL SMEAR: SLIGHT
POLYCHROMASIA BLD QL SMEAR: ABNORMAL
POTASSIUM SERPL-SCNC: 3.7 MMOL/L (ref 3.5–5.1)
POTASSIUM SERPL-SCNC: 4 MMOL/L (ref 3.5–5.1)
PROT SERPL-MCNC: 7.1 G/DL (ref 6–8.4)
RBC # BLD AUTO: 3.45 M/UL (ref 4–5.4)
SODIUM SERPL-SCNC: 148 MMOL/L (ref 136–145)
SODIUM SERPL-SCNC: 154 MMOL/L (ref 136–145)
TROPONIN I SERPL DL<=0.01 NG/ML-MCNC: <0.006 NG/ML (ref 0–0.03)
WBC # BLD AUTO: 4.37 K/UL (ref 3.9–12.7)

## 2020-10-12 PROCEDURE — 99900035 HC TECH TIME PER 15 MIN (STAT)

## 2020-10-12 PROCEDURE — 83880 ASSAY OF NATRIURETIC PEPTIDE: CPT

## 2020-10-12 PROCEDURE — 63600175 PHARM REV CODE 636 W HCPCS: Performed by: INTERNAL MEDICINE

## 2020-10-12 PROCEDURE — 27000221 HC OXYGEN, UP TO 24 HOURS

## 2020-10-12 PROCEDURE — 25000003 PHARM REV CODE 250: Performed by: FAMILY MEDICINE

## 2020-10-12 PROCEDURE — 36415 COLL VENOUS BLD VENIPUNCTURE: CPT

## 2020-10-12 PROCEDURE — 87449 NOS EACH ORGANISM AG IA: CPT

## 2020-10-12 PROCEDURE — 25000003 PHARM REV CODE 250: Performed by: INTERNAL MEDICINE

## 2020-10-12 PROCEDURE — 84100 ASSAY OF PHOSPHORUS: CPT

## 2020-10-12 PROCEDURE — 80053 COMPREHEN METABOLIC PANEL: CPT

## 2020-10-12 PROCEDURE — 83735 ASSAY OF MAGNESIUM: CPT

## 2020-10-12 PROCEDURE — 85652 RBC SED RATE AUTOMATED: CPT

## 2020-10-12 PROCEDURE — 84484 ASSAY OF TROPONIN QUANT: CPT

## 2020-10-12 PROCEDURE — 99233 PR SUBSEQUENT HOSPITAL CARE,LEVL III: ICD-10-PCS | Mod: ,,, | Performed by: INTERNAL MEDICINE

## 2020-10-12 PROCEDURE — 83036 HEMOGLOBIN GLYCOSYLATED A1C: CPT

## 2020-10-12 PROCEDURE — 87040 BLOOD CULTURE FOR BACTERIA: CPT | Mod: 59

## 2020-10-12 PROCEDURE — 80048 BASIC METABOLIC PNL TOTAL CA: CPT

## 2020-10-12 PROCEDURE — 63600175 PHARM REV CODE 636 W HCPCS: Performed by: FAMILY MEDICINE

## 2020-10-12 PROCEDURE — 85025 COMPLETE CBC W/AUTO DIFF WBC: CPT

## 2020-10-12 PROCEDURE — 97802 MEDICAL NUTRITION INDIV IN: CPT

## 2020-10-12 PROCEDURE — 83615 LACTATE (LD) (LDH) ENZYME: CPT

## 2020-10-12 PROCEDURE — 99233 SBSQ HOSP IP/OBS HIGH 50: CPT | Mod: ,,, | Performed by: INTERNAL MEDICINE

## 2020-10-12 PROCEDURE — 94761 N-INVAS EAR/PLS OXIMETRY MLT: CPT

## 2020-10-12 PROCEDURE — 20600001 HC STEP DOWN PRIVATE ROOM

## 2020-10-12 RX ORDER — DEXTROSE MONOHYDRATE 50 MG/ML
INJECTION, SOLUTION INTRAVENOUS CONTINUOUS
Status: DISCONTINUED | OUTPATIENT
Start: 2020-10-12 | End: 2020-10-13

## 2020-10-12 RX ORDER — VANCOMYCIN HCL IN 5 % DEXTROSE 1G/250ML
1000 PLASTIC BAG, INJECTION (ML) INTRAVENOUS
Status: DISCONTINUED | OUTPATIENT
Start: 2020-10-12 | End: 2020-10-13

## 2020-10-12 RX ADMIN — ENOXAPARIN SODIUM 40 MG: 40 INJECTION SUBCUTANEOUS at 04:10

## 2020-10-12 RX ADMIN — VANCOMYCIN HYDROCHLORIDE 1000 MG: 1 INJECTION, POWDER, LYOPHILIZED, FOR SOLUTION INTRAVENOUS at 10:10

## 2020-10-12 RX ADMIN — DEXAMETHASONE SODIUM PHOSPHATE 6 MG: 4 INJECTION INTRA-ARTICULAR; INTRALESIONAL; INTRAMUSCULAR; INTRAVENOUS; SOFT TISSUE at 09:10

## 2020-10-12 RX ADMIN — THERA TABS 1 TABLET: TAB at 02:10

## 2020-10-12 RX ADMIN — BACLOFEN 10 MG: 10 TABLET ORAL at 09:10

## 2020-10-12 RX ADMIN — LEVETIRACETAM 1088 MG: 100 SOLUTION ORAL at 09:10

## 2020-10-12 RX ADMIN — DEXTROSE: 5 SOLUTION INTRAVENOUS at 09:10

## 2020-10-12 RX ADMIN — AZITHROMYCIN MONOHYDRATE 500 MG: 500 INJECTION, POWDER, LYOPHILIZED, FOR SOLUTION INTRAVENOUS at 09:10

## 2020-10-12 RX ADMIN — VANCOMYCIN HYDROCHLORIDE 1000 MG: 1 INJECTION, POWDER, LYOPHILIZED, FOR SOLUTION INTRAVENOUS at 09:10

## 2020-10-12 RX ADMIN — MELATONIN TAB 3 MG 3 MG: 3 TAB at 09:10

## 2020-10-12 RX ADMIN — CEFTRIAXONE SODIUM 1 G: 1 INJECTION, POWDER, FOR SOLUTION INTRAMUSCULAR; INTRAVENOUS at 09:10

## 2020-10-12 RX ADMIN — REMDESIVIR 100 MG: 100 INJECTION, POWDER, LYOPHILIZED, FOR SOLUTION INTRAVENOUS at 04:10

## 2020-10-12 RX ADMIN — LEVETIRACETAM 1088 MG: 100 SOLUTION ORAL at 11:10

## 2020-10-12 RX ADMIN — BACLOFEN 10 MG: 10 TABLET ORAL at 02:10

## 2020-10-12 NOTE — PLAN OF CARE
Problem: Fall Injury Risk  Goal: Absence of Fall and Fall-Related Injury  Outcome: Ongoing, Progressing   No falls this shift, patient being monitored 1:1

## 2020-10-12 NOTE — PLAN OF CARE
10/12/20 0907   Post-Acute Status   Post-Acute Authorization Other   Other Status See Comments     SW following patients discharge planning needs, Post acute needs to be determined. SW will continue to follow up.        Lanie Pagan LMSW  [facility]   [phone]

## 2020-10-12 NOTE — PROGRESS NOTES
Hospital Medicine  Progress Note  [facility]      Patient Name: Alexander Sainz  MRN:  [id number]  Hospital Medicine Team: [facility]OSP MED R Lokesh Mishra MD  Date of Admission:  10/11/2020     Length of Stay:  LOS: 1 day       Principal Problem:  Pneumonia due to COVID-19 virus      HPI:  30yoF with severe MR, cerebral palsy, spina bifida, chronic transaminitis, chronic hypernatremia, lives at [facility], who presented to  ED with fever 102 at home, and hypoxemic resp failure with sats in the field 88%.  In ED, temp 99, Tachy 119, O2 sats 82% on RA and improved to 90s on 2-3L.  CXR with fairly dense infiltrate in RML - appears bacterial in nature.  Was Tx-ed for bacterial PNA with Rocephin and Azithro. Covid +  and called for transfer to [facility].  One dose of IV Dexa.      Hospital Course:    10/11 PM: seen in room once made it to covid floor. Father able to be in room s/t new visitor policy however father states he has not seen his daughter since around 2/2020 (?pandemic precautions) as she lives at [facility] and he is unsure of nutrition requirements through her PEG tube. Does not take anything po. Restraints placed as risk of pulling at tubes and oxygen.  10/12- blood culture: gm+ cocci in clusters      Interval History:     afebrile  overnight. Patient sleeping. Nonverbal. On 4L  + blood culture X1    Review of Systems:  Unable to obtain, patient non verbal  Inpatient Medications:    Current Facility-Administered Medications:     azithromycin 500 mg in dextrose 5 % 250 mL IVPB (ready to mix system), 500 mg, Intravenous, Q24H, Augie Calvo MD, Last Rate: 250 mL/hr at 10/12/20 0903, 500 mg at 10/12/20 0903    baclofen tablet 10 mg, 10 mg, Per G Tube, TID, Augie Calvo MD, 10 mg at 10/12/20 0903    cefTRIAXone injection 1 g, 1 g, Intravenous, Q24H, Augie Calvo MD, 1 g at 10/12/20 0904    dexamethasone injection 6 mg, 6 mg, Intravenous, Q24H, Augie Calvo MD, 6 mg at  "10/12/20 0904    dextrose 5 % infusion, , Intravenous, Continuous, Lokesh Mishra MD, Last Rate: 75 mL/hr at 10/12/20 0903    dextrose 50% injection 12.5 g, 12.5 g, Intravenous, PRN, Augie Calvo MD    dextrose 50% injection 25 g, 25 g, Intravenous, PRN, Augie Calvo MD    diazePAM 5-7.5-10 mg (DIASTAT ACUDIAL) rectal kit 20 mg, 20 mg, Rectal, PRN, Augie Calvo MD    enoxaparin injection 40 mg, 40 mg, Subcutaneous, Q24H, Augie Calvo MD, 40 mg at 10/11/20 1657    glucagon (human recombinant) injection 1 mg, 1 mg, Intramuscular, PRN, Augie Calvo MD    lactulose 10 gram/15 mL solution (enema) 200 g, 200 g, Rectal, BID PRN, Augie Calvo MD    levetiracetam oral soln Soln 1,088 mg, 20 mg/kg, Per G Tube, BID, Augie Calvo MD, 1,088 mg at 10/12/20 1123    melatonin tablet 3 mg, 3 mg, Per G Tube, Nightly PRN, Augie Calvo MD    remdesivir (EUA) 100 mg in sodium chloride 0.9% 250 mL infusion, 100 mg, Intravenous, Q24H, Augie Calvo MD    sodium chloride 0.9% flush 10 mL, 10 mL, Intravenous, PRN, Augie Calvo MD    Pharmacy to dose Vancomycin consult, , , Once **AND** vancomycin - pharmacy to dose, , Intravenous, pharmacy to manage frequency, Loeksh Mishra MD    vancomycin in dextrose 5 % 1 gram/250 mL IVPB 1,000 mg, 1,000 mg, Intravenous, Q12H, Lokesh Mishra MD, Last Rate: 166.7 mL/hr at 10/12/20 1045, 1,000 mg at 10/12/20 1045      Physical Exam:      Intake/Output Summary (Last 24 hours) at 10/12/2020 1203  Last data filed at 10/11/2020 2000  Gross per 24 hour   Intake 310 ml   Output --   Net 310 ml     Wt Readings from Last 3 Encounters:   10/12/20 54.4 kg (119 lb 14.9 oz)   10/11/20 54.4 kg (119 lb 14.9 oz)   05/28/19 54.4 kg (120 lb)       /82   Pulse 62   Temp 97.3 °F (36.3 °C) (Axillary)   Resp 20   Ht 5' 1" (1.549 m)   Wt 54.4 kg (119 lb 14.9 oz)   LMP  (LMP Unknown)   SpO2 99%   Breastfeeding No   BMI 22.66 kg/m²     GEN: NAD,   Resp: " coarse bilateral breath sounds, no wheezes or rales, normal work of breathing   CV: RRR, no m/r/g, no edema  GI: soft, NTND  Skin: no rash  Neuro: PERRL    Laboratory:  Lab Results   Component Value Date    [id number] Positive (A) 10/11/2020       Recent Labs   Lab 10/11/20  0735 10/12/20  0511   WBC 4.22 4.37   LYMPH 15.4*  0.7* 23.1  1.0   HGB 11.0* 10.3*   HCT 33.8* 33.3*   * 112*     Recent Labs   Lab 10/11/20  0735 10/12/20  0511   * 154*   K 4.1 4.0   * 116*   CO2 26 26   BUN 20 15   CREATININE 0.7 0.5   * 100   CALCIUM 8.7 9.0   MG  --  2.0   PHOS  --  3.3     Recent Labs   Lab 10/11/20  0735 10/12/20  0511   ALKPHOS 318* 265*   * 115*   * 91*   ALBUMIN 2.6* 2.2*   PROT 7.7 7.1   BILITOT 0.2 0.2        Recent Labs     10/11/20  0918 10/12/20  0511   DDIMER 2.10*  --    FERRITIN 1,000*  --    .8*  --    LDH  --  512*   BNP  --  33   TROPONINI  --  <0.006   CPK 95  --        All labs within the last 24 hours were reviewed.     Microbiology:  Microbiology Results (last 7 days)     Procedure Component Value Units Date/Time    Blood culture [[id number]] Collected: 10/12/20 0958    Order Status: Sent Specimen: Blood from Peripheral, Right Hand Updated: 10/12/20 1011    Blood culture [[id number]] Collected: 10/12/20 0952    Order Status: Sent Specimen: Blood from Peripheral, Right Hand Updated: 10/12/20 1011    Blood culture x two cultures. Draw prior to antibiotics. [[id number]] Collected: 10/11/20 0815    Order Status: Completed Specimen: Blood from Peripheral, Antecubital, Right Updated: 10/12/20 0903     Blood Culture, Routine No Growth to date      No Growth to date    Narrative:      Aerobic and anaerobic    Blood culture x two cultures. Draw prior to antibiotics. [[id number]] Collected: 10/11/20 0735    Order Status: Completed Specimen: Blood from Peripheral, Hand, Left Updated: 10/12/20 0851     Blood Culture, Routine Gram stain aer bottle: Gram positive cocci  in clusters resembling Staph       Results called to and read back by: Betzy Wolfe 10/12/2020  08:51    Narrative:      Aerobic and anaerobic    Culture, Respiratory with Gram Stain [[id number]]     Order Status: No result Specimen: Respiratory from Endotracheal Aspirate             Imaging  ECG Results          EKG 12-lead (In process)  Result time 10/12/20 06:42:04    In process by Interface, Lab In [facility] (10/12/20 06:42:04)                 Narrative:    Test Reason : R00.0,    Vent. Rate : 120 BPM     Atrial Rate : 120 BPM     P-R Int : 146 ms          QRS Dur : 086 ms      QT Int : 316 ms       P-R-T Axes : 045 076 -03 degrees     QTc Int : 446 ms    Sinus tachycardia  Possible Left atrial enlargement  Nonspecific T wave abnormality  Abnormal ECG  When compared with ECG of 07-JUN-2015 08:49,  Significant changes have occurred    Referred By: [email or web address]   SELF           Confirmed By:                   In process by Interface, Lab In [facility] (10/12/20 06:34:58)                 Narrative:    Test Reason : R00.0,    Vent. Rate : 120 BPM     Atrial Rate : 120 BPM     P-R Int : 146 ms          QRS Dur : 086 ms      QT Int : 316 ms       P-R-T Axes : 045 076 -03 degrees     QTc Int : 446 ms    Sinus tachycardia  Possible Left atrial enlargement  Nonspecific T wave abnormality  Abnormal ECG  When compared with ECG of 07-JUN-2015 08:49,  Significant changes have occurred    Referred By: [email or web address]   SELF           Confirmed By:                               No results found for this or any previous visit.    X-Ray Abdomen Portable  Narrative: EXAMINATION:  XR ABDOMEN PORTABLE    CLINICAL HISTORY:  confirm peg tube placement, chronically placed, new admission,;    TECHNIQUE:  Single AP View of the abdomen was performed.    COMPARISON:  03/12/2009    FINDINGS:  Gastrostomy catheter noted.  There are no suspicious calcifications.  Bowel gas pattern is non-obstructive.  No evidence for pneumoperitoneum.  Postoperative  changes of the left hip noted.  There are patchy opacities in the right lung, better seen on chest x-ray.  Impression: As above.    Electronically signed by: Elkin Post MD  Date:    10/11/2020  Time:    15:10  X-Ray Chest AP Portable  Narrative: EXAMINATION:  XR CHEST AP PORTABLE    CLINICAL HISTORY:  Sepsis;    TECHNIQUE:  Single frontal view of the chest was performed.    COMPARISON:  06/09/2015    FINDINGS:  There is patchy mixed opacity with more focal airspace consolidation in the right mid and lower lung zone with patchy ground-glass opacity in the lingula.  Findings are concerning for multilobar pneumonia with aspiration also in the differential.  Heart size is normal.  Skeletal structures are intact.  Impression: As above.    Electronically signed by: Yu Bejarano MD  Date:    10/11/2020  Time:    07:56      All imaging within the last 24 hours was reviewed.     Assessment and Plan:    Active Hospital Problems    Diagnosis  POA    *Pneumonia due to COVID-19 virus [U07.1, J12.89]  Yes    Sinus tachycardia [R00.0]  Yes    Hypoxia [R09.02]  Yes    Hypernatremia [E87.0]  Yes    Abnormal liver enzymes [R74.8]  Yes    Cerebral palsy [G80.9]  Yes      Resolved Hospital Problems   No resolved problems to display.          COVID-19 Virus Infection  Viral Pneumonia due to COVID-19  On empiric Cap coverage:ceftriaxone/rocephin  Covid; on remdesivir/dexamethasone    - COVID-19 testing:   - Isolation: Airborne/Droplet. Surgical mask on patient. Notify Infection Control  - Diagnostics: Trend Q48hrs if stable, more frequently if patient decompensating     Laboratory/Study Frequency Result   CBC Admit & Q48h    CMP Admit & Q48h Na 152--154, LFTs elevated   Magnesium level Admit    D-dimer Admit & Q48h 2.1   Ferritin Admit & Q48h 1000   CRP Admit & Q48h 182.8   CPK Admit & Q24h if elevated 95   LDH Admit & Q48h    Vitamin D Admit    BNP Admit    Troponin Admit & Q6h if elevated    Glucose-6-phos dehydrogenase  Admit    Procalcitonin Admit .62   Lipid Panel If using statin    Rapid influenza Admit -   Resp Infection Panel Admit if BMT/organ transplant    Legionella Antigen Admit ordered   Sputum Culture Admit ordered   Blood Culture Admit 1/2+   Urinalysis & Culture Admit    ECG Admit & Q48h if on HCQ    CXR Admit    Lymphopenia, hyponatremia, hyperferritinemia, elevated troponin, elevated d-dimer, age, and medical comorbidities are significant predictors of poor clinical outcome    - Management: per [facility] COVID Treatment Protocol (4/15/20)    - Monitoring:   - Telemetry & Continuous Pulse Oximetry    - Nutrition:    - Multivitamin PO daily   - Add Boost supplement   - Vitamin D 1000IU daily if deficient   - Ascorbic acid 500mg PO bid    - Supportive Care:   - acetaminophen 650mg PO Q6hr PRN fever/headache   - loperamide PRN viral diarrhea   - IVF if indicated, restrictive strategy preferred, no maintenance IV if able   - VTE PPx: enoxaparin or heparin SQ unless contraindicated    - Antibiotics:  - if indications, CXR findings, elevated procal. See protocol for alternatives.   - Discontinue early if low concern for bacterial co-infection   - ceftriaxone 1g Q24h x 5 days  - azithromycin 500mg po x1, then 250mg po daily x 4 days    - Investigational Therapies:   - If patient meets criteria    Acute Hypoxemic Respiratory Failure  On 4L today  - Order RT consult via Respiratory Communication for COVID Protocols  - Order Incentive Spirometer Q4h  - Order Flutter Valve Q4h  - Continuous Pulse Oximetry  - Goal SpO2 92-96%  - Supplemental O2 via LFNC, VentiMask, or HFNC (see Respiratory Support Oxygen Therapies)  - If wheezing, albuterol INH Q6h scheduled & PRN  - Proning Protocol if patient is a candidate (see  Proning Protocol)   - GCS >13, able to self-prone  - If deterioration, may warrant trial of NIPPV and transfer to [facility] pressure room or immediate ICU consult    Advance Care Planning    If patient transitions to  "Comfort-Focused Care, please place "Nurse Communication: End of Life Care, family members allowed to visit, including spouse/partner and adult children [please list names]. Please ask family to visit as a group and leave as a group.      HyperNA  Free water deficit  Started D5 but will start Free water pushes with tube feeds     Malnutrition  Nutrition recs  Isosource 1.5 at 40ml/hr for 733ml    CP   Complications  Spastic/sz/developmentally delayed  Nonverbal, spastic  Continue baclofen  Continue home keppra      VTE High Risk Prophylaxis:   VTE Risk Mitigation (From admission, onward)         Ordered     enoxaparin injection 40 mg  Every 24 hours      10/11/20 1334     IP VTE HIGH RISK PATIENT  Once      10/11/20 1334     Place sequential compression device  Until discontinued      10/11/20 1334                  Subsequent Inpatient Hospital Care  Level 3 33731 Total visit time was 35 minutes or greater with greater than 50% of time spent in counseling and coordination of care.           Lokesh Mishra MD  Hospital Medicine  Pager: [phone]  Spectra; [phone]    "

## 2020-10-12 NOTE — CONSULTS
"  [facility] - [facility] 15 WT  Adult Nutrition  Consult Note    SUMMARY     Recommendations    1. When medically feasible, initiate enteral nutrition. Rec'd Isosource 1.5 @ 40 mL/hr to provide 1440 kcals, 65 g of protein, 733 mL fluid. Free water flushes: 175 mL - 4x/day.  2. RD to monitor & follow-up.    Goals: Meet % EEN, EPN by RD f/u date  Nutrition Goal Status: new  Communication of RD Recs: reviewed with RN    Reason for Assessment    Reason For Assessment: consult  Diagnosis: other (see comments)(PNA 2/2 COVID-19)  Relevant Medical History: MR, CP, SB, Peg placement  Interdisciplinary Rounds: did not attend    General Information Comments: RD working remotely, pt +COVID-19. Pt presents from group home. Unsure of TF regimen PTA (pt w/ PEG), # x 5 years - per chart review. NFPE not complete 2/2 +COVID-19, however pt likely doesn't meet malnutrition criteria 2/2 stable weight > 5 years.  Nutrition Discharge Planning: Tolerance of TF    Nutrition/Diet History    Patient Reported Diet/Restrictions/Preferences: no oral intake(Per H & P)  Factors Affecting Nutritional Intake: NPO  Nutrition Support Formula Prior to Admit: Other (see comments)(PANCHITO)    Anthropometrics    Temp: 97.3 °F (36.3 °C)  Height Method: Stated  Height: 5' 1" (154.9 cm)  Height (inches): 61 in  Weight Method: Stated  Weight: 54.4 kg (119 lb 14.9 oz)  Weight (lb): 119.93 lb  Ideal Body Weight (IBW), Female: 105 lb  % Ideal Body Weight, Female (lb): 114.22 %  BMI (Calculated): 22.7  BMI Grade: 18.5-24.9 - normal  Usual Body Weight (UBW), kg: (120#, per chart review x 5 years.)    Lab/Procedures/Meds    Pertinent Labs Reviewed: reviewed  Pertinent Labs Comments: Na 154  Pertinent Medications Reviewed: reviewed  Pertinent Medications Comments: D5    Estimated/Assessed Needs    Weight Used For Calorie Calculations: 54.4 kg (119 lb 14.9 oz)     Energy Calorie Requirements (kcal): 1489 kcal/d  Energy Need Method: [place](1.25 PAL)     Protein Requirements: 65 g/d (1.2 g/kg)  Weight Used For Protein Calculations: 54.4 kg (119 lb 14.9 oz)     Estimated Fluid Requirement Method: other (see comments)(Per MD or 1 mL/kcal)  RDA Method (mL): 1489    Nutrition Prescription Ordered    Current Diet Order: NPO    Evaluation of Received Nutrient/Fluid Intake    Comments: LBM: 10/11    Nutrition Risk    Level of Risk/Frequency of Follow-up: (2x/week)     Assessment and Plan    Nutrition Problem  Inadequate energy intake    Related to (etiology):   Inability to consume sufficient energy    Signs and Symptoms (as evidenced by):   NPO    Interventions(treatment strategy):  Collaboration of nutrition care w/ other providers  Enteral nutrition     Nutrition Diagnosis Status:   New     Monitor and Evaluation    Food and Nutrient Intake: enteral nutrition intake  Food and Nutrient Adminstration: enteral and parenteral nutrition administration  Physical Activity and Function: nutrition-related ADLs and IADLs  Anthropometric Measurements: weight, weight change  Biochemical Data, Medical Tests and Procedures: inflammatory profile, lipid profile, glucose/endocrine profile, gastrointestinal profile, electrolyte and renal panel  Nutrition-Focused Physical Findings: overall appearance     Nutrition Follow-Up    RD Follow-up?: Yes

## 2020-10-12 NOTE — PLAN OF CARE
No acute events during shift. Pt tolerated all activities well. Pt voiding with no difficulties. Tube feeding started as ordered. Pt father was at bedside visiting today. Pt was turned every two hours. Pt participated with all therapies. All questions answered. Will endorse care to NOC RN.

## 2020-10-12 NOTE — PLAN OF CARE
Recommendations     1. When medically feasible, initiate enteral nutrition. Rec'd Isosource 1.5 @ 40 mL/hr to provide 1440 kcals, 65 g of protein, 733 mL fluid. Free water flushes: 175 mL - 4x/day.  2. RD to monitor & follow-up.     Goals: Meet % EEN, EPN by RD f/u date  Nutrition Goal Status: new  Communication of RD Recs: reviewed with RN

## 2020-10-12 NOTE — PLAN OF CARE
Problem: Restraint, Nonbehavioral (Nonviolent)  Goal: Discontinuation Criteria Achieved  10/12/2020 0238 by Veronica West RN  Outcome: Ongoing, Progressing  10/12/2020 0237 by Veronica West RN  Outcome: Ongoing, Progressing  Goal: Personal Dignity and Safety Maintained  10/12/2020 0238 by Veronica West RN  Outcome: Ongoing, Progressing  10/12/2020 0237 by Veronica West RN  Outcome: Ongoing, Progressing   Patient having restraint to right wrist monitored q 2 hours and PRN.

## 2020-10-12 NOTE — PROGRESS NOTES
Pharmacokinetic Initial Assessment: IV Vancomycin    Assessment/Plan:    -Initiate vancomycin IV for bacteremia (10/11 BCx 2/4 bottles growing GPCs).   -Empiric goal is 15-20 mcg/mL.  -Give vancomycin 1000 mg (18 mg/kg) IV Q12H.  -Est AUC: mcg*hr/mL.  -Obtain a trough prior to the fourth dose on 10/13 @ 6889.   Pharmacy will continue to follow and monitor vancomycin.      Please contact pharmacy at extension [phone] with any questions regarding this assessment.     Thank you for the consult,   Jamil Otero       Patient brief summary:  Alexander Sainz is a 30 y.o. female initiated on antimicrobial therapy with IV Vancomycin for treatment of suspected bacteremia    Drug Allergies:   Review of patient's allergies indicates:  No Known Allergies    Actual Body Weight:   54.4 kg    Renal Function:   Estimated Creatinine Clearance: 121.9 mL/min (based on SCr of 0.5 mg/dL).,     Dialysis Method (if applicable):  N/A    CBC (last 72 hours):  Recent Labs   Lab Result Units 10/11/20  0735 10/12/20  0511   WBC K/uL 4.22 4.37   Hemoglobin g/dL 11.0* 10.3*   Hemoglobin A1C %  --  5.3   Hematocrit % 33.8* 33.3*   Platelets K/uL 122* 112*   Gran% % 79.9*  --    Lymph% % 15.4*  --    Mono% % 4.0  --    Eosinophil% % 0.0  --    Basophil% % 0.2  --    Differential Method  Automated  --        Metabolic Panel (last 72 hours):  Recent Labs   Lab Result Units 10/11/20  0735 10/11/20  0800 10/12/20  0511   Sodium mmol/L 152*  --  154*   Potassium mmol/L 4.1  --  4.0   Chloride mmol/L 113*  --  116*   CO2 mmol/L 26  --  26   Glucose mg/dL 235*  --  100   Glucose, UA   --  3+*  --    BUN, Bld mg/dL 20  --  15   Creatinine mg/dL 0.7  --  0.5   Albumin g/dL 2.6*  --  2.2*   Total Bilirubin mg/dL 0.2  --  0.2   Alkaline Phosphatase U/L 318*  --  265*   AST U/L 152*  --  91*   ALT U/L 146*  --  115*   Magnesium mg/dL  --   --  2.0   Phosphorus mg/dL  --   --  3.3       Drug levels (last 3 results):  No results for input(s):  VANCOMYCINRA, VANCOMYCINPE, VANCOMYCINTR in the last 72 hours.    Microbiologic Results:  Microbiology Results (last 7 days)     Procedure Component Value Units Date/Time    Blood culture x two cultures. Draw prior to antibiotics. [[id number]] Collected: 10/11/20 0815    Order Status: Completed Specimen: Blood from Peripheral, Antecubital, Right Updated: 10/12/20 0903     Blood Culture, Routine No Growth to date      No Growth to date    Narrative:      Aerobic and anaerobic    Blood culture [[id number]]     Order Status: Sent Specimen: Blood     Blood culture [[id number]]     Order Status: Sent Specimen: Blood     Blood culture x two cultures. Draw prior to antibiotics. [[id number]] Collected: 10/11/20 0735    Order Status: Completed Specimen: Blood from Peripheral, Hand, Left Updated: 10/12/20 0851     Blood Culture, Routine Gram stain aer bottle: Gram positive cocci in clusters resembling Staph       Results called to and read back by: Betzy Wolfe 10/12/2020  08:51    Narrative:      Aerobic and anaerobic    Culture, Respiratory with Gram Stain [[id number]]     Order Status: No result Specimen: Respiratory from Endotracheal Aspirate

## 2020-10-12 NOTE — PLAN OF CARE
This CM spoke with Renard Sainz (Father) to complete discharge planning assessment. Per father, Ms Sainz resides at the [facility]. She is complete bed/wheel chair bound. Has peg tube for feeding, has catheter or wears diapers all the time. She is non conversational. She will return to the [facility] when medically stable.  CM provided contact number / questions answered. Will continue to follow for course of hospitalization.    10/11/2020  7:05 AM   Sinus tachycardia [R00.0]  Hypoxia [R09.02]  Pneumonia due to COVID-19 virus [U07.1, J12.89]  Pneumonia due to COVID-19 virus [U07.1, J12.89]        PCP:  Cristóbal Krishnan MD    Pharmacy:  No Pharmacies Listed    Emergency Contacts:  Extended Emergency Contact Information  Primary Emergency Contact: Renard Sainz   [place]  Mobile Phone: [phone]  Relation: Father  Secondary Emergency Contact: SainzCrayn   [facility]  Home Phone: [phone]  Relation: Relative    Insurance: Payor: MEDICAID / Plan: MEDICA[place] / Product Type: Government /       Magalys Gold RN  Case Management  Ext: [phone]       10/12/20 1516   Discharge Assessment   Assessment Type Discharge Planning Assessment   Confirmed/corrected address and phone number on facesheet? Yes   Assessment information obtained from? Other  (father - Renard Sainz - [phone])   Expected Length of Stay (days) 4   Communicated expected length of stay with patient/caregiver yes   Prior to hospitilization cognitive status: Unable to Assess  (non conversational)   Prior to hospitalization functional status: Wheelchair Bound;Completely Dependent   Current cognitive status: Unable to Assess  (non conversational)   Current Functional Status: Wheelchair Bound;Completely Dependent   Facility Arrived From: pt diagnosed with covid friday 10/09/2020 - special needs lives at [facility] - arrived via EMS   Lives With other (see comments)  (Lives at [facility] for  SPE[facility])   Who are your caregiver(s) and their phone number(s)?  - Renard Sainz - [phone]   Patient's perception of discharge disposition nursing home  (Lives at [facility])   Readmission Within the Last 30 Days no previous admission in last 30 days   Patient currently being followed by outpatient case management? No   Patient currently receives any other outside agency services? No   Equipment Currently Used at Home feeding device;hospital bed;wheelchair   Do you have any problems affording any of your prescribed medications? No   Is the patient taking medications as prescribed? yes   Does the patient have transportation home? Yes   Transportation Anticipated other (see comments)  (transportation services or facility transport)   Dialysis Name and Scheduled days n/a   Does the patient receive services at the [facility]? No   Discharge Plan A Return to nursing home   Discharge Plan B Return to Nursing Home   DME Needed Upon Discharge  feeding device;wheelchair;hospital bed   Patient/Family in Agreement with Plan yes

## 2020-10-13 LAB
25(OH)D3+25(OH)D2 SERPL-MCNC: 54 NG/ML (ref 30–96)
ALBUMIN SERPL BCP-MCNC: 2.3 G/DL (ref 3.5–5.2)
ALBUMIN SERPL BCP-MCNC: 2.3 G/DL (ref 3.5–5.2)
ALP SERPL-CCNC: 235 U/L (ref 55–135)
ALP SERPL-CCNC: 247 U/L (ref 55–135)
ALT SERPL W/O P-5'-P-CCNC: 101 U/L (ref 10–44)
ALT SERPL W/O P-5'-P-CCNC: 94 U/L (ref 10–44)
ANION GAP SERPL CALC-SCNC: 11 MMOL/L (ref 8–16)
ANION GAP SERPL CALC-SCNC: 8 MMOL/L (ref 8–16)
ANISOCYTOSIS BLD QL SMEAR: SLIGHT
AST SERPL-CCNC: 68 U/L (ref 10–40)
AST SERPL-CCNC: 78 U/L (ref 10–40)
BASOPHILS # BLD AUTO: 0.01 K/UL (ref 0–0.2)
BASOPHILS # BLD AUTO: 0.02 K/UL (ref 0–0.2)
BASOPHILS NFR BLD: 0.2 % (ref 0–1.9)
BASOPHILS NFR BLD: 0.5 % (ref 0–1.9)
BILIRUB SERPL-MCNC: 0.2 MG/DL (ref 0.1–1)
BILIRUB SERPL-MCNC: 0.2 MG/DL (ref 0.1–1)
BUN SERPL-MCNC: 18 MG/DL (ref 6–20)
BUN SERPL-MCNC: 20 MG/DL (ref 6–20)
CALCIUM SERPL-MCNC: 8.8 MG/DL (ref 8.7–10.5)
CALCIUM SERPL-MCNC: 8.9 MG/DL (ref 8.7–10.5)
CHLORIDE SERPL-SCNC: 113 MMOL/L (ref 95–110)
CHLORIDE SERPL-SCNC: 114 MMOL/L (ref 95–110)
CO2 SERPL-SCNC: 26 MMOL/L (ref 23–29)
CO2 SERPL-SCNC: 28 MMOL/L (ref 23–29)
CREAT SERPL-MCNC: 0.5 MG/DL (ref 0.5–1.4)
CREAT SERPL-MCNC: 0.5 MG/DL (ref 0.5–1.4)
DIFFERENTIAL METHOD: ABNORMAL
DIFFERENTIAL METHOD: ABNORMAL
EOSINOPHIL # BLD AUTO: 0 K/UL (ref 0–0.5)
EOSINOPHIL # BLD AUTO: 0 K/UL (ref 0–0.5)
EOSINOPHIL NFR BLD: 0 % (ref 0–8)
EOSINOPHIL NFR BLD: 0 % (ref 0–8)
ERYTHROCYTE [DISTWIDTH] IN BLOOD BY AUTOMATED COUNT: 13.2 % (ref 11.5–14.5)
ERYTHROCYTE [DISTWIDTH] IN BLOOD BY AUTOMATED COUNT: 13.2 % (ref 11.5–14.5)
EST. GFR  (AFRICAN AMERICAN): >60 ML/MIN/1.73 M^2
EST. GFR  (AFRICAN AMERICAN): >60 ML/MIN/1.73 M^2
EST. GFR  (NON AFRICAN AMERICAN): >60 ML/MIN/1.73 M^2
EST. GFR  (NON AFRICAN AMERICAN): >60 ML/MIN/1.73 M^2
GLUCOSE SERPL-MCNC: 168 MG/DL (ref 70–110)
GLUCOSE SERPL-MCNC: 97 MG/DL (ref 70–110)
HCT VFR BLD AUTO: 31.1 % (ref 37–48.5)
HCT VFR BLD AUTO: 31.6 % (ref 37–48.5)
HGB BLD-MCNC: 10 G/DL (ref 12–16)
HGB BLD-MCNC: 9.8 G/DL (ref 12–16)
HYPOCHROMIA BLD QL SMEAR: ABNORMAL
IMM GRANULOCYTES # BLD AUTO: 0.04 K/UL (ref 0–0.04)
IMM GRANULOCYTES # BLD AUTO: 0.05 K/UL (ref 0–0.04)
IMM GRANULOCYTES NFR BLD AUTO: 0.9 % (ref 0–0.5)
IMM GRANULOCYTES NFR BLD AUTO: 0.9 % (ref 0–0.5)
LACTATE SERPL-SCNC: 0.9 MMOL/L (ref 0.5–2.2)
LYMPHOCYTES # BLD AUTO: 0.9 K/UL (ref 1–4.8)
LYMPHOCYTES # BLD AUTO: 0.9 K/UL (ref 1–4.8)
LYMPHOCYTES NFR BLD: 16.9 % (ref 18–48)
LYMPHOCYTES NFR BLD: 20.7 % (ref 18–48)
MAGNESIUM SERPL-MCNC: 2.1 MG/DL (ref 1.6–2.6)
MCH RBC QN AUTO: 29.6 PG (ref 27–31)
MCH RBC QN AUTO: 29.6 PG (ref 27–31)
MCHC RBC AUTO-ENTMCNC: 31.5 G/DL (ref 32–36)
MCHC RBC AUTO-ENTMCNC: 31.6 G/DL (ref 32–36)
MCV RBC AUTO: 94 FL (ref 82–98)
MCV RBC AUTO: 94 FL (ref 82–98)
MONOCYTES # BLD AUTO: 0.1 K/UL (ref 0.3–1)
MONOCYTES # BLD AUTO: 0.2 K/UL (ref 0.3–1)
MONOCYTES NFR BLD: 3.3 % (ref 4–15)
MONOCYTES NFR BLD: 4 % (ref 4–15)
NEUTROPHILS # BLD AUTO: 3.2 K/UL (ref 1.8–7.7)
NEUTROPHILS # BLD AUTO: 4.3 K/UL (ref 1.8–7.7)
NEUTROPHILS NFR BLD: 74.6 % (ref 38–73)
NEUTROPHILS NFR BLD: 78 % (ref 38–73)
NRBC BLD-RTO: 0 /100 WBC
NRBC BLD-RTO: 1 /100 WBC
OVALOCYTES BLD QL SMEAR: ABNORMAL
PHOSPHATE SERPL-MCNC: 3.8 MG/DL (ref 2.7–4.5)
PLATELET # BLD AUTO: 113 K/UL (ref 150–350)
PLATELET # BLD AUTO: 127 K/UL (ref 150–350)
PLATELET BLD QL SMEAR: ABNORMAL
PMV BLD AUTO: 11.4 FL (ref 9.2–12.9)
PMV BLD AUTO: 12.1 FL (ref 9.2–12.9)
POIKILOCYTOSIS BLD QL SMEAR: SLIGHT
POLYCHROMASIA BLD QL SMEAR: ABNORMAL
POTASSIUM SERPL-SCNC: 3.8 MMOL/L (ref 3.5–5.1)
POTASSIUM SERPL-SCNC: 3.9 MMOL/L (ref 3.5–5.1)
PROT SERPL-MCNC: 6.8 G/DL (ref 6–8.4)
PROT SERPL-MCNC: 6.9 G/DL (ref 6–8.4)
RBC # BLD AUTO: 3.31 M/UL (ref 4–5.4)
RBC # BLD AUTO: 3.38 M/UL (ref 4–5.4)
SODIUM SERPL-SCNC: 149 MMOL/L (ref 136–145)
SODIUM SERPL-SCNC: 151 MMOL/L (ref 136–145)
T4 FREE SERPL-MCNC: 0.72 NG/DL (ref 0.71–1.51)
TSH SERPL DL<=0.005 MIU/L-ACNC: 1.65 UIU/ML (ref 0.4–4)
WBC # BLD AUTO: 4.3 K/UL (ref 3.9–12.7)
WBC # BLD AUTO: 5.51 K/UL (ref 3.9–12.7)

## 2020-10-13 PROCEDURE — 80053 COMPREHEN METABOLIC PANEL: CPT

## 2020-10-13 PROCEDURE — 85025 COMPLETE CBC W/AUTO DIFF WBC: CPT

## 2020-10-13 PROCEDURE — 99233 SBSQ HOSP IP/OBS HIGH 50: CPT | Mod: ,,, | Performed by: INTERNAL MEDICINE

## 2020-10-13 PROCEDURE — 99233 PR SUBSEQUENT HOSPITAL CARE,LEVL III: ICD-10-PCS | Mod: ,,, | Performed by: INTERNAL MEDICINE

## 2020-10-13 PROCEDURE — 94761 N-INVAS EAR/PLS OXIMETRY MLT: CPT

## 2020-10-13 PROCEDURE — 84439 ASSAY OF FREE THYROXINE: CPT

## 2020-10-13 PROCEDURE — 20600001 HC STEP DOWN PRIVATE ROOM

## 2020-10-13 PROCEDURE — 82306 VITAMIN D 25 HYDROXY: CPT

## 2020-10-13 PROCEDURE — 27000221 HC OXYGEN, UP TO 24 HOURS

## 2020-10-13 PROCEDURE — 80053 COMPREHEN METABOLIC PANEL: CPT | Mod: 91

## 2020-10-13 PROCEDURE — 84100 ASSAY OF PHOSPHORUS: CPT

## 2020-10-13 PROCEDURE — 87040 BLOOD CULTURE FOR BACTERIA: CPT

## 2020-10-13 PROCEDURE — 83605 ASSAY OF LACTIC ACID: CPT

## 2020-10-13 PROCEDURE — 84443 ASSAY THYROID STIM HORMONE: CPT

## 2020-10-13 PROCEDURE — 36415 COLL VENOUS BLD VENIPUNCTURE: CPT

## 2020-10-13 PROCEDURE — 63600175 PHARM REV CODE 636 W HCPCS: Performed by: FAMILY MEDICINE

## 2020-10-13 PROCEDURE — 83735 ASSAY OF MAGNESIUM: CPT

## 2020-10-13 PROCEDURE — 25000003 PHARM REV CODE 250: Performed by: FAMILY MEDICINE

## 2020-10-13 PROCEDURE — 25000003 PHARM REV CODE 250: Performed by: INTERNAL MEDICINE

## 2020-10-13 PROCEDURE — 99900035 HC TECH TIME PER 15 MIN (STAT)

## 2020-10-13 RX ORDER — ATROPINE SULFATE 0.4 MG/ML
0.4 INJECTION, SOLUTION ENDOTRACHEAL; INTRAMEDULLARY; INTRAMUSCULAR; INTRAVENOUS; SUBCUTANEOUS EVERY 5 MIN PRN
Status: DISCONTINUED | OUTPATIENT
Start: 2020-10-13 | End: 2020-10-13

## 2020-10-13 RX ADMIN — BACLOFEN 10 MG: 10 TABLET ORAL at 11:10

## 2020-10-13 RX ADMIN — AZITHROMYCIN MONOHYDRATE 500 MG: 500 INJECTION, POWDER, LYOPHILIZED, FOR SOLUTION INTRAVENOUS at 12:10

## 2020-10-13 RX ADMIN — REMDESIVIR 100 MG: 100 INJECTION, POWDER, LYOPHILIZED, FOR SOLUTION INTRAVENOUS at 06:10

## 2020-10-13 RX ADMIN — BACLOFEN 10 MG: 10 TABLET ORAL at 06:10

## 2020-10-13 RX ADMIN — LEVETIRACETAM 1088 MG: 100 SOLUTION ORAL at 12:10

## 2020-10-13 RX ADMIN — DEXAMETHASONE SODIUM PHOSPHATE 6 MG: 4 INJECTION INTRA-ARTICULAR; INTRALESIONAL; INTRAMUSCULAR; INTRAVENOUS; SOFT TISSUE at 11:10

## 2020-10-13 RX ADMIN — LEVETIRACETAM 1088 MG: 100 SOLUTION ORAL at 09:10

## 2020-10-13 RX ADMIN — ENOXAPARIN SODIUM 40 MG: 40 INJECTION SUBCUTANEOUS at 09:10

## 2020-10-13 RX ADMIN — CEFTRIAXONE SODIUM 1 G: 1 INJECTION, POWDER, FOR SOLUTION INTRAMUSCULAR; INTRAVENOUS at 11:10

## 2020-10-13 RX ADMIN — BACLOFEN 10 MG: 10 TABLET ORAL at 09:10

## 2020-10-13 RX ADMIN — THERA TABS 1 TABLET: TAB at 11:10

## 2020-10-13 NOTE — PLAN OF CARE
Problem: Fall Injury Risk  Goal: Absence of Fall and Fall-Related Injury  Outcome: Ongoing, Progressing     Problem: Restraint, Nonbehavioral (Nonviolent)  Goal: Personal Dignity and Safety Maintained  Outcome: Ongoing, Progressing     Problem: Adult Inpatient Plan of Care  Goal: Plan of Care Review  Outcome: Ongoing, Progressing  Goal: Patient-Specific Goal (Individualization)  Outcome: Ongoing, Progressing  Goal: Absence of Hospital-Acquired Illness or Injury  Outcome: Ongoing, Progressing  Goal: Optimal Comfort and Wellbeing  Outcome: Ongoing, Progressing  Goal: Readiness for Transition of Care  Outcome: Ongoing, Progressing  Goal: Rounds/Family Conference  Outcome: Ongoing, Progressing     Problem: Wound  Goal: Optimal Wound Healing  Outcome: Ongoing, Progressing     Problem: Skin Injury Risk Increased  Goal: Skin Health and Integrity  Outcome: Ongoing, Progressing

## 2020-10-13 NOTE — CONSULTS
Called by primary for evaluation of hypothermia/bradycardia. 31 yo AAF with cerebral palsy admitted with Covid PNA with new onset hypothermia today. Temperature down to reported 92-93F and assocaited with bradycardia to 40s. Hemodynamically stable. Evaluated patient at bedside, she is awake and alert but unable to interact with exam or history due to CP. Oral temp not obtainable due to inability to close mouth. Axillary temp does not appear to be registering appropriately. Patient with warming blanket on and vitals stable on bedside monitor. Father at bedside interacting with patient, states she is at her baseline as she makes eye contact, smiles, and plays with her father. Also reports she has had multiple episodes in the past of her temperature dropping, spontaneously improves after warming her with blankets at home. Patient currently on rocephin/azithro for possible CAP. BCx from 10/11 growing coag neg staph likely contaminant. BCx repeated since then are negative. At this time patient does not have any ICU needs. Recommend obtaining rectal temp probe to monitor temperature more accurately. Keep warming blankets on as needed to keep temperature within normal range. Should patient deteriorate in any way, or become hemodynamically unstable, please do not hesitate to reconsult.    Ollie Bruce MD  [employer] [facility] / Critical Care, PGY-4

## 2020-10-13 NOTE — CARE UPDATE
"RAPID RESPONSE NURSE PROACTIVE ROUNDING NOTE     Time of Visit: 1805 via eICU    Admit Date: 10/11/2020  LOS: 2  Code Status: Full Code   Date of Visit: 10/13/2020  : 1990  Age: 30 y.o.  Sex: female  Race: Black or   Bed: 69045/42518 A:   MRN: [id number]  Was the patient discharged from an ICU this admission? no   Was the patient discharged from a PACU within last 24 hours?  no  Did the patient receive conscious sedation/general anesthesia in last 24 hours?  no  Was the patient in the ED within the past 24 hours?  no  Was the patient started on NIPPV within the past 24 hours?  no  Attending Physician: Lokesh Mishra MD  Primary Service: [facility] MED R    ASSESSMENT     Notified by proactive rounding.  Reason for alert: hypothermia    Diagnosis: Pneumonia due to COVID-19 virus    Abnormal Vital Signs: /78 (BP Location: Left arm, Patient Position: Lying)   Pulse 82   Temp (!) 95.2 °F (35.1 °C)   Resp (!) 24   Ht 5' 1" (1.549 m)   Wt 54.4 kg (119 lb 14.9 oz)   LMP  (LMP Unknown)   SpO2 95%   Breastfeeding No   BMI 22.66 kg/m²      Clinical Issues: Respiratory    Patient  has a past medical history of ADHD (attention deficit hyperactivity disorder), Blood transfusion, Cerebral palsy, Congenital hip dislocation, Encephalopathy, Infantile myoclonic epilepsy, Iron deficiency anemia, Mental retardation, Pneumonia, aspiration, Seizures, and Spina bifida aperta.      Pt sitting up in bed restless and agitated. Nasal cannula noted to be off and in bed with pt. Bedside RNLulú, called. RN states she will go in the room when she is done in another pt's room. SpO2 in the 90s currently on RA. Charge RN notified. MICU consulted this AM for hypothermia and bradycardia. Recs for warming blankets. Hypothermia and bradycardia was resolved with this intervention. Blankets are now off pt and temp is again 95.7 degrees. HR 70s-80s. Warming blankets to be placed back on pt to resolve hypothermia.    "   INTERVENTIONS/ RECOMMENDATIONS     - Maintain pt's temperature with warming blankets.   - Monitor vital signs per unit protocol.      Discussed plan of care with RNLulú.    PHYSICIAN ESCALATION     Yes/No  no    Disposition: Remain in room 45964.    FOLLOW-UP     Call back the Rapid Response Nurse, Alethea Grimm RN at [phone] for additional questions or concerns.

## 2020-10-13 NOTE — PROGRESS NOTES
Hospital Medicine  Progress Note  [facility]      Patient Name: Alexander Sainz  MRN:  [id number]  Hospital Medicine Team: [facility] MED R Lokesh Mishra MD  Date of Admission:  10/11/2020     Length of Stay:  LOS: 2 days       Principal Problem:  Pneumonia due to COVID-19 virus      HPI:  30yoF with severe MR, cerebral palsy, spina bifida, chronic transaminitis, chronic hypernatremia, lives at [facility], who presented to  ED with fever 102 at home, and hypoxemic resp failure with sats in the field 88%.  In ED, temp 99, Tachy 119, O2 sats 82% on RA and improved to 90s on 2-3L.  CXR with fairly dense infiltrate in RML - appears bacterial in nature.  Was Tx-ed for bacterial PNA with Rocephin and Azithro. Covid +  and called for transfer to [facility].  One dose of IV Dexa.      Hospital Course:    10/11 PM: seen in room once made it to covid floor. Father able to be in room s/t new visitor policy however father states he has not seen his daughter since around 2/2020 (?pandemic precautions) as she lives at [facility] and he is unsure of nutrition requirements through her PEG tube. Does not take anything po. Restraints placed as risk of pulling at tubes and oxygen.  10/12- blood culture: gm+ cocci in clusters      Interval History:     afebrile  overnight. Patient sleeping. Nonverbal. On 4L  Blood culture grew CN staph 1/4 d/c vanc. No increase 02 requirment overnight. Tube feedings hanging.    Review of Systems:  Unable to obtain, patient non verbal  Inpatient Medications:    Current Facility-Administered Medications:     azithromycin 500 mg in dextrose 5 % 250 mL IVPB (ready to mix system), 500 mg, Intravenous, Q24H, Augie Calvo MD, Last Rate: 250 mL/hr at 10/12/20 0903, 500 mg at 10/12/20 0903    baclofen tablet 10 mg, 10 mg, Per G Tube, TID, Augie Calvo MD, 10 mg at 10/12/20 2157    cefTRIAXone injection 1 g, 1 g, Intravenous, Q24H, Augie Calvo MD, 1 g at 10/12/20 0904     dexamethasone injection 6 mg, 6 mg, Intravenous, Q24H, Augie Calvo MD, 6 mg at 10/12/20 0904    dextrose 5 % infusion, , Intravenous, Continuous, Lokesh Mishra MD, Last Rate: 75 mL/hr at 10/12/20 0903    dextrose 50% injection 12.5 g, 12.5 g, Intravenous, PRN, Augie Calvo MD    dextrose 50% injection 25 g, 25 g, Intravenous, PRN, Augie Calvo MD    diazePAM 5-7.5-10 mg (DIASTAT ACUDIAL) rectal kit 20 mg, 20 mg, Rectal, PRN, Augie Calvo MD    enoxaparin injection 40 mg, 40 mg, Subcutaneous, Q24H, Augie Calvo MD, 40 mg at 10/12/20 1640    glucagon (human recombinant) injection 1 mg, 1 mg, Intramuscular, PRN, Augie Calvo MD    lactulose 10 gram/15 mL solution (enema) 200 g, 200 g, Rectal, BID PRN, Augie Calvo MD    levetiracetam oral soln Soln 1,088 mg, 20 mg/kg, Per G Tube, BID, Augie Calvo MD, 1,088 mg at 10/12/20 2158    melatonin tablet 3 mg, 3 mg, Per G Tube, Nightly PRN, Augie Calvo MD, 3 mg at 10/12/20 2157    multivitamin tablet, 1 tablet, Oral, Daily, Lokesh Mishra MD, 1 tablet at 10/12/20 1403    remdesivir (EUA) 100 mg in sodium chloride 0.9% 250 mL infusion, 100 mg, Intravenous, Q24H, Augie Calvo MD, Last Rate: 250 mL/hr at 10/12/20 1640, 100 mg at 10/12/20 1640    sodium chloride 0.9% flush 10 mL, 10 mL, Intravenous, PRN, Augie Calvo MD    Pharmacy to dose Vancomycin consult, , , Once **AND** vancomycin - pharmacy to dose, , Intravenous, pharmacy to manage frequency, Lokesh Mishra MD      Physical Exam:      Intake/Output Summary (Last 24 hours) at 10/13/2020 0831  Last data filed at 10/13/2020 0800  Gross per 24 hour   Intake 120 ml   Output 1500 ml   Net -1380 ml     Wt Readings from Last 3 Encounters:   10/12/20 54.4 kg (119 lb 14.9 oz)   10/11/20 54.4 kg (119 lb 14.9 oz)   05/28/19 54.4 kg (120 lb)       BP (!) 134/96 (BP Location: Left arm, Patient Position: Lying)   Pulse (!) 56   Temp 97.5 °F (36.4 °C) (Axillary)   Resp  "18   Ht 5' 1" (1.549 m)   Wt 54.4 kg (119 lb 14.9 oz)   LMP  (LMP Unknown)   SpO2 95%   Breastfeeding No   BMI 22.66 kg/m²     GEN: NAD, opens eyes to voice  Resp: coarse bilateral breath sounds, no wheezes or rales, normal work of breathing   CV: RRR, no m/r/g, no edema  GI: soft, NTND  Skin: no rash  Neuro: PERRL    Laboratory:  Lab Results   Component Value Date    [id number] Positive (A) 10/11/2020       Recent Labs   Lab 10/11/20  0735 10/12/20  0511 10/13/20  0251   WBC 4.22 4.37 5.51   LYMPH 15.4*  0.7* 23.1  1.0 16.9*  0.9*   HGB 11.0* 10.3* 9.8*   HCT 33.8* 33.3* 31.1*   * 112* 113*     Recent Labs   Lab 10/12/20  0511 10/12/20  1352 10/13/20  0251   * 148* 151*   K 4.0 3.7 3.9   * 113* 114*   CO2 26 29 26   BUN 15 16 18   CREATININE 0.5 0.5 0.5    153* 97   CALCIUM 9.0 8.8 8.8   MG 2.0  --  2.1   PHOS 3.3  --  3.8     Recent Labs   Lab 10/11/20  0735 10/12/20  0511 10/13/20  0251   ALKPHOS 318* 265* 247*   * 115* 101*   * 91* 78*   ALBUMIN 2.6* 2.2* 2.3*   PROT 7.7 7.1 6.8   BILITOT 0.2 0.2 0.2        Recent Labs     10/11/20  0918 10/12/20  0511   DDIMER 2.10*  --    FERRITIN 1,000*  --    .8*  --    LDH  --  512*   BNP  --  33   TROPONINI  --  <0.006   CPK 95  --        All labs within the last 24 hours were reviewed.     Microbiology:  Microbiology Results (last 7 days)     Procedure Component Value Units Date/Time    Blood culture x two cultures. Draw prior to antibiotics. [[id number]]  (Abnormal) Collected: 10/11/20 0735    Order Status: Completed Specimen: Blood from Peripheral, Hand, Left Updated: 10/13/20 0809     Blood Culture, Routine Gram stain aer bottle: Gram positive cocci in clusters resembling Staph       Results called to and read back by: Betzy Wolfe 10/12/2020  08:51      COAGULASE-NEGATIVE STAPHYLOCOCCUS SPECIES  Organism is a probable contaminant      Narrative:      Aerobic and anaerobic    Blood culture [[id number]] " Collected: 10/12/20 0958    Order Status: Completed Specimen: Blood from Peripheral, Right Hand Updated: 10/12/20 2115     Blood Culture, Routine No Growth to date    Blood culture [[id number]] Collected: 10/12/20 0952    Order Status: Completed Specimen: Blood from Peripheral, Right Hand Updated: 10/12/20 2115     Blood Culture, Routine No Growth to date    Blood culture x two cultures. Draw prior to antibiotics. [[id number]] Collected: 10/11/20 0815    Order Status: Completed Specimen: Blood from Peripheral, Antecubital, Right Updated: 10/12/20 0903     Blood Culture, Routine No Growth to date      No Growth to date    Narrative:      Aerobic and anaerobic    Culture, Respiratory with Gram Stain [[id number]]     Order Status: No result Specimen: Respiratory from Endotracheal Aspirate             Imaging  ECG Results          EKG 12-lead (In process)  Result time 10/12/20 06:42:04    In process by Interface, Lab In [facility] (10/12/20 06:42:04)                 Narrative:    Test Reason : R00.0,    Vent. Rate : 120 BPM     Atrial Rate : 120 BPM     P-R Int : 146 ms          QRS Dur : 086 ms      QT Int : 316 ms       P-R-T Axes : 045 076 -03 degrees     QTc Int : 446 ms    Sinus tachycardia  Possible Left atrial enlargement  Nonspecific T wave abnormality  Abnormal ECG  When compared with ECG of 07-JUN-2015 08:49,  Significant changes have occurred    Referred By: [email or web address]   SELF           Confirmed By:                   In process by Interface, Lab In [facility] (10/12/20 06:34:58)                 Narrative:    Test Reason : R00.0,    Vent. Rate : 120 BPM     Atrial Rate : 120 BPM     P-R Int : 146 ms          QRS Dur : 086 ms      QT Int : 316 ms       P-R-T Axes : 045 076 -03 degrees     QTc Int : 446 ms    Sinus tachycardia  Possible Left atrial enlargement  Nonspecific T wave abnormality  Abnormal ECG  When compared with ECG of 07-JUN-2015 08:49,  Significant changes have occurred    Referred By: [email or web address]   SELF            Confirmed By:                               No results found for this or any previous visit.    X-Ray Abdomen Portable  Narrative: EXAMINATION:  XR ABDOMEN PORTABLE    CLINICAL HISTORY:  confirm peg tube placement, chronically placed, new admission,;    TECHNIQUE:  Single AP View of the abdomen was performed.    COMPARISON:  03/12/2009    FINDINGS:  Gastrostomy catheter noted.  There are no suspicious calcifications.  Bowel gas pattern is non-obstructive.  No evidence for pneumoperitoneum.  Postoperative changes of the left hip noted.  There are patchy opacities in the right lung, better seen on chest x-ray.  Impression: As above.    Electronically signed by: Elkin Post MD  Date:    10/11/2020  Time:    15:10  X-Ray Chest AP Portable  Narrative: EXAMINATION:  XR CHEST AP PORTABLE    CLINICAL HISTORY:  Sepsis;    TECHNIQUE:  Single frontal view of the chest was performed.    COMPARISON:  06/09/2015    FINDINGS:  There is patchy mixed opacity with more focal airspace consolidation in the right mid and lower lung zone with patchy ground-glass opacity in the lingula.  Findings are concerning for multilobar pneumonia with aspiration also in the differential.  Heart size is normal.  Skeletal structures are intact.  Impression: As above.    Electronically signed by: Yu Bejarano MD  Date:    10/11/2020  Time:    07:56      All imaging within the last 24 hours was reviewed.     Assessment and Plan:    Active Hospital Problems    Diagnosis  POA    *Pneumonia due to COVID-19 virus [U07.1, J12.89]  Yes    Sinus tachycardia [R00.0]  Yes    Hypoxia [R09.02]  Yes    Hypernatremia [E87.0]  Yes    Abnormal liver enzymes [R74.8]  Yes    Cerebral palsy [G80.9]  Yes      Resolved Hospital Problems   No resolved problems to display.          COVID-19 Virus Infection  Viral Pneumonia due to COVID-19  On empiric Cap coverage:ceftriaxone/rocephin  Covid; on remdesivir/dexamethasone    - COVID-19 testing:   - Isolation:  Airborne/Droplet. Surgical mask on patient. Notify Infection Control  - Diagnostics: Trend Q48hrs if stable, more frequently if patient decompensating     Laboratory/Study Frequency Result   CBC Admit & Q48h    CMP Admit & Q48h Na 152--154, LFTs elevated   Magnesium level Admit    D-dimer Admit & Q48h 2.1   Ferritin Admit & Q48h 1000   CRP Admit & Q48h 182.8   CPK Admit & Q24h if elevated 95   LDH Admit & Q48h    Vitamin D Admit    BNP Admit    Troponin Admit & Q6h if elevated    Glucose-6-phos dehydrogenase Admit    Procalcitonin Admit .62   Lipid Panel If using statin    Rapid influenza Admit -   Resp Infection Panel Admit if BMT/organ transplant    Legionella Antigen Admit ordered   Sputum Culture Admit ordered   Blood Culture Admit 1/2+   Urinalysis & Culture Admit    ECG Admit & Q48h if on HCQ    CXR Admit    Lymphopenia, hyponatremia, hyperferritinemia, elevated troponin, elevated d-dimer, age, and medical comorbidities are significant predictors of poor clinical outcome    - Management: per [facility] COVID Treatment Protocol (4/15/20)    - Monitoring:   - Telemetry & Continuous Pulse Oximetry    - Nutrition:    - Multivitamin PO daily   - Add Boost supplement   - Vitamin D 1000IU daily if deficient   - Ascorbic acid 500mg PO bid    - Supportive Care:   - acetaminophen 650mg PO Q6hr PRN fever/headache   - loperamide PRN viral diarrhea   - IVF if indicated, restrictive strategy preferred, no maintenance IV if able   - VTE PPx: enoxaparin or heparin SQ unless contraindicated    - Antibiotics:  - if indications, CXR findings, elevated procal. See protocol for alternatives.   - Discontinue early if low concern for bacterial co-infection   - ceftriaxone 1g Q24h x 5 days  - azithromycin 500mg po x1, then 250mg po daily x 4 days    - Investigational Therapies:   - If patient meets criteria    Acute Hypoxemic Respiratory Failure  On 4L today  - Order RT consult via Respiratory Communication for COVID Protocols  - Order  "Incentive Spirometer Q4h  - Order Flutter Valve Q4h  - Continuous Pulse Oximetry  - Goal SpO2 92-96%  - Supplemental O2 via LFNC, VentiMask, or HFNC (see Respiratory Support Oxygen Therapies)  - If wheezing, albuterol INH Q6h scheduled & PRN  - Proning Protocol if patient is a candidate (see  Proning Protocol)   - GCS >13, able to self-prone  - If deterioration, may warrant trial of NIPPV and transfer to [facility] pressure room or immediate ICU consult    Advance Care Planning    If patient transitions to Comfort-Focused Care, please place "Nurse Communication: End of Life Care, family members allowed to visit, including spouse/partner and adult children [please list names]. Please ask family to visit as a group and leave as a group.      HyperNA  151 today  Correcting with Free water pushes 175ml QID  Will continue to montior     Malnutrition  Isosource 1.5 at 40ml/hr for 733ml  Today    CP   Complications  Spastic/sz/developmentally delayed  Nonverbal, spastic  Continue baclofen  Continue home keppra      VTE High Risk Prophylaxis:   VTE Risk Mitigation (From admission, onward)         Ordered     enoxaparin injection 40 mg  Every 24 hours      10/11/20 1334     IP VTE HIGH RISK PATIENT  Once      10/11/20 1334     Place sequential compression device  Until discontinued      10/11/20 1334                  Subsequent Inpatient Hospital Care  Level 3 73663 Total visit time was 35 minutes or greater with greater than 50% of time spent in counseling and coordination of care.           Lokesh Mishra MD  Hospital Medicine  Pager: [phone]  Spectra; [phone]      Addendum  Patient hypothermic mid morning.   Lactate normal, Urine outpt good,  blood cultures repeated, CBC/CMP unchanged.  ICU evaluated patient and recommended warming blankets and continuing to monitor  Will check TSH   "

## 2020-10-13 NOTE — PROGRESS NOTES
Therapy with vancomycin complete and/or consult discontinued by provider.  Pharmacy will sign off, please re-consult as needed.    Thank you,  Mera Vásquez  [phone]

## 2020-10-13 NOTE — PLAN OF CARE
Disoriented x 4. On 4L HFNC. No bp issues SB on the monitor. Agitated and restless most of the night. 1 huge bowel movement that came out in clumps as if she was somewhat impacted prior. Pulses good in restrained extrem. Telesitter and sitter outside of the room. External cath only catching some urine. Cannot use call light. Side rails up x 3

## 2020-10-14 LAB
ALBUMIN SERPL BCP-MCNC: 2.4 G/DL (ref 3.5–5.2)
ALP SERPL-CCNC: 238 U/L (ref 55–135)
ALT SERPL W/O P-5'-P-CCNC: 82 U/L (ref 10–44)
ANION GAP SERPL CALC-SCNC: 11 MMOL/L (ref 8–16)
ANISOCYTOSIS BLD QL SMEAR: SLIGHT
AST SERPL-CCNC: 52 U/L (ref 10–40)
BASOPHILS # BLD AUTO: 0.01 K/UL (ref 0–0.2)
BASOPHILS NFR BLD: 0.3 % (ref 0–1.9)
BILIRUB SERPL-MCNC: 0.2 MG/DL (ref 0.1–1)
BUN SERPL-MCNC: 18 MG/DL (ref 6–20)
CALCIUM SERPL-MCNC: 8.9 MG/DL (ref 8.7–10.5)
CHLORIDE SERPL-SCNC: 114 MMOL/L (ref 95–110)
CO2 SERPL-SCNC: 27 MMOL/L (ref 23–29)
CREAT SERPL-MCNC: 0.6 MG/DL (ref 0.5–1.4)
CRP SERPL-MCNC: 46.3 MG/L (ref 0–8.2)
D DIMER PPP IA.FEU-MCNC: 2.16 MG/L FEU
DIFFERENTIAL METHOD: ABNORMAL
EOSINOPHIL # BLD AUTO: 0 K/UL (ref 0–0.5)
EOSINOPHIL NFR BLD: 0 % (ref 0–8)
ERYTHROCYTE [DISTWIDTH] IN BLOOD BY AUTOMATED COUNT: 13.3 % (ref 11.5–14.5)
ERYTHROCYTE [SEDIMENTATION RATE] IN BLOOD BY WESTERGREN METHOD: >120 MM/HR (ref 0–36)
EST. GFR  (AFRICAN AMERICAN): >60 ML/MIN/1.73 M^2
EST. GFR  (NON AFRICAN AMERICAN): >60 ML/MIN/1.73 M^2
FERRITIN SERPL-MCNC: 1214 NG/ML (ref 20–300)
GLUCOSE SERPL-MCNC: 112 MG/DL (ref 70–110)
HCT VFR BLD AUTO: 32 % (ref 37–48.5)
HGB BLD-MCNC: 10.2 G/DL (ref 12–16)
IMM GRANULOCYTES # BLD AUTO: 0.13 K/UL (ref 0–0.04)
IMM GRANULOCYTES NFR BLD AUTO: 3.5 % (ref 0–0.5)
LDH SERPL L TO P-CCNC: 579 U/L (ref 110–260)
LYMPHOCYTES # BLD AUTO: 1.1 K/UL (ref 1–4.8)
LYMPHOCYTES NFR BLD: 29.4 % (ref 18–48)
MAGNESIUM SERPL-MCNC: 2 MG/DL (ref 1.6–2.6)
MCH RBC QN AUTO: 29.5 PG (ref 27–31)
MCHC RBC AUTO-ENTMCNC: 31.9 G/DL (ref 32–36)
MCV RBC AUTO: 93 FL (ref 82–98)
MONOCYTES # BLD AUTO: 0.3 K/UL (ref 0.3–1)
MONOCYTES NFR BLD: 6.8 % (ref 4–15)
NEUTROPHILS # BLD AUTO: 2.2 K/UL (ref 1.8–7.7)
NEUTROPHILS NFR BLD: 60 % (ref 38–73)
NRBC BLD-RTO: 2 /100 WBC
PHOSPHATE SERPL-MCNC: 3.6 MG/DL (ref 2.7–4.5)
PLATELET # BLD AUTO: 151 K/UL (ref 150–350)
PLATELET BLD QL SMEAR: ABNORMAL
PMV BLD AUTO: 12.2 FL (ref 9.2–12.9)
POTASSIUM SERPL-SCNC: 3.9 MMOL/L (ref 3.5–5.1)
PROT SERPL-MCNC: 6.9 G/DL (ref 6–8.4)
RBC # BLD AUTO: 3.46 M/UL (ref 4–5.4)
SODIUM SERPL-SCNC: 152 MMOL/L (ref 136–145)
WBC # BLD AUTO: 3.67 K/UL (ref 3.9–12.7)

## 2020-10-14 PROCEDURE — 84100 ASSAY OF PHOSPHORUS: CPT

## 2020-10-14 PROCEDURE — 83615 LACTATE (LD) (LDH) ENZYME: CPT

## 2020-10-14 PROCEDURE — 63600175 PHARM REV CODE 636 W HCPCS: Performed by: HOSPITALIST

## 2020-10-14 PROCEDURE — 85025 COMPLETE CBC W/AUTO DIFF WBC: CPT

## 2020-10-14 PROCEDURE — 83735 ASSAY OF MAGNESIUM: CPT

## 2020-10-14 PROCEDURE — 63600175 PHARM REV CODE 636 W HCPCS: Performed by: FAMILY MEDICINE

## 2020-10-14 PROCEDURE — 80053 COMPREHEN METABOLIC PANEL: CPT

## 2020-10-14 PROCEDURE — 99232 PR SUBSEQUENT HOSPITAL CARE,LEVL II: ICD-10-PCS | Mod: ,,, | Performed by: INTERNAL MEDICINE

## 2020-10-14 PROCEDURE — 20600001 HC STEP DOWN PRIVATE ROOM

## 2020-10-14 PROCEDURE — 85652 RBC SED RATE AUTOMATED: CPT

## 2020-10-14 PROCEDURE — 25000003 PHARM REV CODE 250: Performed by: FAMILY MEDICINE

## 2020-10-14 PROCEDURE — 99232 SBSQ HOSP IP/OBS MODERATE 35: CPT | Mod: ,,, | Performed by: INTERNAL MEDICINE

## 2020-10-14 PROCEDURE — 85379 FIBRIN DEGRADATION QUANT: CPT

## 2020-10-14 PROCEDURE — 25000003 PHARM REV CODE 250: Performed by: INTERNAL MEDICINE

## 2020-10-14 PROCEDURE — 97803 MED NUTRITION INDIV SUBSEQ: CPT

## 2020-10-14 PROCEDURE — 94761 N-INVAS EAR/PLS OXIMETRY MLT: CPT

## 2020-10-14 PROCEDURE — 27000221 HC OXYGEN, UP TO 24 HOURS

## 2020-10-14 PROCEDURE — 36415 COLL VENOUS BLD VENIPUNCTURE: CPT

## 2020-10-14 PROCEDURE — 99900035 HC TECH TIME PER 15 MIN (STAT)

## 2020-10-14 PROCEDURE — 82728 ASSAY OF FERRITIN: CPT

## 2020-10-14 PROCEDURE — 86140 C-REACTIVE PROTEIN: CPT

## 2020-10-14 RX ORDER — DEXTROSE MONOHYDRATE 50 MG/ML
INJECTION, SOLUTION INTRAVENOUS CONTINUOUS
Status: ACTIVE | OUTPATIENT
Start: 2020-10-14 | End: 2020-10-14

## 2020-10-14 RX ORDER — CEFTRIAXONE 1 G/1
1 INJECTION, POWDER, FOR SOLUTION INTRAMUSCULAR; INTRAVENOUS
Status: DISCONTINUED | OUTPATIENT
Start: 2020-10-15 | End: 2020-10-14

## 2020-10-14 RX ORDER — LORAZEPAM 2 MG/ML
0.5 INJECTION INTRAMUSCULAR ONCE AS NEEDED
Status: COMPLETED | OUTPATIENT
Start: 2020-10-14 | End: 2020-10-14

## 2020-10-14 RX ORDER — CEFTRIAXONE 1 G/1
1 INJECTION, POWDER, FOR SOLUTION INTRAMUSCULAR; INTRAVENOUS
Status: COMPLETED | OUTPATIENT
Start: 2020-10-15 | End: 2020-10-15

## 2020-10-14 RX ADMIN — BACLOFEN 10 MG: 10 TABLET ORAL at 02:10

## 2020-10-14 RX ADMIN — LEVETIRACETAM 1088 MG: 100 SOLUTION ORAL at 10:10

## 2020-10-14 RX ADMIN — MELATONIN TAB 3 MG 3 MG: 3 TAB at 10:10

## 2020-10-14 RX ADMIN — LEVETIRACETAM 1088 MG: 100 SOLUTION ORAL at 11:10

## 2020-10-14 RX ADMIN — AZITHROMYCIN MONOHYDRATE 500 MG: 500 INJECTION, POWDER, LYOPHILIZED, FOR SOLUTION INTRAVENOUS at 08:10

## 2020-10-14 RX ADMIN — REMDESIVIR 100 MG: 100 INJECTION, POWDER, LYOPHILIZED, FOR SOLUTION INTRAVENOUS at 04:10

## 2020-10-14 RX ADMIN — THERA TABS 1 TABLET: TAB at 08:10

## 2020-10-14 RX ADMIN — BACLOFEN 10 MG: 10 TABLET ORAL at 10:10

## 2020-10-14 RX ADMIN — LORAZEPAM 0.5 MG: 2 INJECTION INTRAMUSCULAR; INTRAVENOUS at 09:10

## 2020-10-14 RX ADMIN — DEXTROSE: 5 SOLUTION INTRAVENOUS at 08:10

## 2020-10-14 RX ADMIN — BACLOFEN 10 MG: 10 TABLET ORAL at 08:10

## 2020-10-14 RX ADMIN — ENOXAPARIN SODIUM 40 MG: 40 INJECTION SUBCUTANEOUS at 10:10

## 2020-10-14 RX ADMIN — DEXAMETHASONE SODIUM PHOSPHATE 6 MG: 4 INJECTION INTRA-ARTICULAR; INTRALESIONAL; INTRAMUSCULAR; INTRAVENOUS; SOFT TISSUE at 08:10

## 2020-10-14 RX ADMIN — CEFTRIAXONE SODIUM 1 G: 1 INJECTION, POWDER, FOR SOLUTION INTRAMUSCULAR; INTRAVENOUS at 08:10

## 2020-10-14 NOTE — NURSING
"Pt resting in bed, O2 placed on pt as it was off, pt fighting placement by turning head and "scrunching" face to avoid O2 placement. Pt nonverbal, has sever MR and unable to assist in care, tele sitter present, father aware of care plan. WCTM  "

## 2020-10-14 NOTE — PLAN OF CARE
10/14/20 1514   Post-Acute Status   Post-Acute Authorization Other   Other Status See Comments     SW following patient dc planning needs, Post acute needs to be determined. SW will continue to follow up.      Lanie Pagan LMSW  [facility]   [phone]

## 2020-10-14 NOTE — PLAN OF CARE
Recommendations     1. Continue current TF regimen of Isosource 1.5 @ 40 mL/hr - meeting needs.               - If bolus TFs desired, rec'd Isosource 1.5 - 4 cartons/day to provide 1500 kcals, 68 g of protein, 764 mL fluid.  2. RD to monitor & follow-up.     Goals: Meet % EEN, EPN by RD f/u date  Nutrition Goal Status: goal met  Communication of RD Recs: reviewed with RN

## 2020-10-14 NOTE — PROGRESS NOTES
"  [facility] - ICU 15 WT  Adult Nutrition  Consult Note    SUMMARY     Recommendations    1. Continue current TF regimen of Isosource 1.5 @ 40 mL/hr - meeting needs.    - If bolus TFs desired, rec'd Isosource 1.5 - 4 cartons/day to provide 1500 kcals, 68 g of protein, 764 mL fluid.  2. RD to monitor & follow-up.    Goals: Meet % EEN, EPN by RD f/u date  Nutrition Goal Status: goal met  Communication of RD Recs: reviewed with RN    Reason for Assessment    Reason For Assessment: RD follow-up  Diagnosis: other (see comments)(PNA 2/2 COVID-19)  Relevant Medical History: MR, CP, SB, Peg placement  Interdisciplinary Rounds: did not attend    General Information Comments: RD working remotely, pt +COVID-19. Pt remains NPO, tolerating enteral nutrition at goal via PEG. Pt presents from group home. Unsure of TF regimen PTA (pt w/ PEG), # x 5 years - per chart review. NFPE not complete 2/2 +COVID-19, however pt likely doesn't meet malnutrition criteria 2/2 stable weight > 5 years.  Nutrition Discharge Planning: Tolerance of TF    Nutrition/Diet History    Patient Reported Diet/Restrictions/Preferences: no oral intake(Per H & P)  Factors Affecting Nutritional Intake: NPO  Nutrition Support Formula Prior to Admit: Other (see comments)(PANCHITO)    Anthropometrics    Temp: 96.8 °F (36 °C)  Height Method: Estimated  Height: 5' 1" (154.9 cm)  Height (inches): 61 in  Weight Method: Estimated  Weight: 54.4 kg (119 lb 14.9 oz)  Weight (lb): 119.93 lb  Ideal Body Weight (IBW), Female: 105 lb  % Ideal Body Weight, Female (lb): 114.22 %  BMI (Calculated): 22.7  BMI Grade: 18.5-24.9 - normal  Usual Body Weight (UBW), kg: (120#, per chart review x 5 years.)    Lab/Procedures/Meds    Pertinent Labs Reviewed: reviewed  Pertinent Labs Comments: Na 152  Pertinent Medications Reviewed: reviewed  Pertinent Medications Comments: D5    Estimated/Assessed Needs    Weight Used For Calorie Calculations: 54.4 kg (119 lb 14.9 oz) "     Energy Calorie Requirements (kcal): 1489 kcal/d  Energy Need Method: [place]-St Jeor(1.25 PAL)     Protein Requirements: 65 g/d (1.2 g/kg)  Weight Used For Protein Calculations: 54.4 kg (119 lb 14.9 oz)     Estimated Fluid Requirement Method: other (see comments)(Per MD or 1 mL/kcal)  RDA Method (mL): 1489    Nutrition Prescription Ordered    Current Diet Order: NPO  Current Nutrition Support Formula Ordered: Isosource 1.5  Current Nutrition Support Rate Ordered: 40 mL/hr    Evaluation of Received Nutrient/Fluid Intake    Enteral Calories (kcal): 1440  Enteral Protein (gm): 65  Enteral (Free Water) Fluid (mL): 733  Free Water Flush Fluid (mL): 700    % Kcal Needs: 97%  % Protein Needs: 100%    Energy Calories Required: meeting needs  Protein Required: meeting needs  Fluid Required: other (see comments)(Per MD or 1 mL/kcal)    Comments: LBM: 10/11    Tolerance: tolerating    Nutrition Risk    Level of Risk/Frequency of Follow-up: (1x/week)     Assessment and Plan    Nutrition Problem  Inadequate energy intake     Related to (etiology):   Inability to consume sufficient energy     Signs and Symptoms (as evidenced by):   NPO     Interventions(treatment strategy):  Collaboration of nutrition care w/ other providers  Enteral nutrition      Nutrition Diagnosis Status:   Continues     Monitor and Evaluation    Food and Nutrient Intake: enteral nutrition intake  Food and Nutrient Adminstration: enteral and parenteral nutrition administration  Physical Activity and Function: nutrition-related ADLs and IADLs  Anthropometric Measurements: weight, weight change  Biochemical Data, Medical Tests and Procedures: inflammatory profile, lipid profile, glucose/endocrine profile, gastrointestinal profile, electrolyte and renal panel  Nutrition-Focused Physical Findings: overall appearance     Nutrition Follow-Up    RD Follow-up?: Yes

## 2020-10-14 NOTE — PROGRESS NOTES
Hospital Medicine  Progress Note  [facility]      Patient Name: Alexander Sainz  MRN:  [id number]  Hospital Medicine Team: [facility] R Lokesh Mishra MD  Date of Admission:  10/11/2020     Length of Stay:  LOS: 3 days       Principal Problem:  Pneumonia due to COVID-19 virus      HPI:  30yoF with severe MR, cerebral palsy, spina bifida, chronic transaminitis, chronic hypernatremia, lives at [facility], who presented to  ED with fever 102 at home, and hypoxemic resp failure with sats in the field 88%.  In ED, temp 99, Tachy 119, O2 sats 82% on RA and improved to 90s on 2-3L.  CXR with fairly dense infiltrate in RML - appears bacterial in nature.  Was Tx-ed for bacterial PNA with Rocephin and Azithro. Covid +  and called for transfer to [facility].  One dose of IV Dexa.      Hospital Course:    10/11 PM: seen in room once made it to covid floor. Father able to be in room s/t new visitor policy however father states he has not seen his daughter since around 2/2020 (?pandemic precautions) as she lives at [facility] and he is unsure of nutrition requirements through her PEG tube. Does not take anything po. Restraints placed as risk of pulling at tubes and oxygen.  10/12- blood culture: gm+ cocci in clusters      Interval History:     afebrile  overnight. Patient's hypothermia resolved with blanket and warming device. Patient's father at bedside this am. Reports she looks comfortable.    Review of Systems:  Unable to obtain, patient non verbal  Inpatient Medications:    Current Facility-Administered Medications:     baclofen tablet 10 mg, 10 mg, Per G Tube, TID, Augie Calvo MD, 10 mg at 10/14/20 1421    dexamethasone injection 6 mg, 6 mg, Intravenous, Q24H, Augie Calvo MD, 6 mg at 10/14/20 0857    dextrose 5 % infusion, , Intravenous, Continuous, Lokesh Mishra MD, Last Rate: 75 mL/hr at 10/14/20 0856    dextrose 50% injection 12.5 g, 12.5 g, Intravenous, PRN, Augie Calvo,  "MD    dextrose 50% injection 25 g, 25 g, Intravenous, PRN, Augie Calvo MD    diazePAM 5-7.5-10 mg (DIASTAT ACUDIAL) rectal kit 20 mg, 20 mg, Rectal, PRN, Augie Calvo MD    enoxaparin injection 40 mg, 40 mg, Subcutaneous, Q24H, Augie Calvo MD, 40 mg at 10/13/20 2142    glucagon (human recombinant) injection 1 mg, 1 mg, Intramuscular, PRN, Augie Calvo MD    lactulose 10 gram/15 mL solution (enema) 200 g, 200 g, Rectal, BID PRN, Augie Calvo MD    levetiracetam oral soln Soln 1,088 mg, 20 mg/kg, Per G Tube, BID, Augie Calvo MD, 1,088 mg at 10/14/20 1102    melatonin tablet 3 mg, 3 mg, Per G Tube, Nightly PRN, Augie Calvo MD, 3 mg at 10/12/20 2157    multivitamin tablet, 1 tablet, Oral, Daily, Lokesh Mishra MD, 1 tablet at 10/14/20 0858    remdesivir (EUA) 100 mg in sodium chloride 0.9% 250 mL infusion, 100 mg, Intravenous, Q24H, Augie Calvo MD, Last Rate: 250 mL/hr at 10/13/20 1841, 100 mg at 10/13/20 1841    sodium chloride 0.9% flush 10 mL, 10 mL, Intravenous, PRN, Augie Calvo MD      Physical Exam:      Intake/Output Summary (Last 24 hours) at 10/14/2020 1510  Last data filed at 10/14/2020 0856  Gross per 24 hour   Intake 425 ml   Output 950 ml   Net -525 ml     Wt Readings from Last 3 Encounters:   10/14/20 54.4 kg (119 lb 14.9 oz)   10/11/20 54.4 kg (119 lb 14.9 oz)   05/28/19 54.4 kg (120 lb)       BP 99/61   Pulse 68   Temp 96.8 °F (36 °C) (Rectal)   Resp 18   Ht 5' 1" (1.549 m)   Wt 54.4 kg (119 lb 14.9 oz)   LMP  (LMP Unknown)   SpO2 95%   Breastfeeding No   BMI 22.66 kg/m²     GEN: NAD, opens eyes to voice  Resp: coarse bilateral breath sounds, no wheezes or rales, normal work of breathing   CV: RRR, no m/r/g, no edema  GI: soft, NTND  Skin: no rash  Neuro: PERRL    Laboratory:  Lab Results   Component Value Date    [id number] Positive (A) 10/11/2020       Recent Labs   Lab 10/13/20  0251 10/13/20  1115 10/14/20  0453   WBC 5.51 4.30 3.67* "   LYMPH 16.9*  0.9* 20.7  0.9* 29.4  1.1   HGB 9.8* 10.0* 10.2*   HCT 31.1* 31.6* 32.0*   * 127* 151     Recent Labs   Lab 10/12/20  0511  10/13/20  0251 10/13/20  1103 10/14/20  0452   *   < > 151* 149* 152*   K 4.0   < > 3.9 3.8 3.9   *   < > 114* 113* 114*   CO2 26   < > 26 28 27   BUN 15   < > 18 20 18   CREATININE 0.5   < > 0.5 0.5 0.6      < > 97 168* 112*   CALCIUM 9.0   < > 8.8 8.9 8.9   MG 2.0  --  2.1  --  2.0   PHOS 3.3  --  3.8  --  3.6    < > = values in this interval not displayed.     Recent Labs   Lab 10/13/20  0251 10/13/20  1103 10/14/20  0452   ALKPHOS 247* 235* 238*   * 94* 82*   AST 78* 68* 52*   ALBUMIN 2.3* 2.3* 2.4*   PROT 6.8 6.9 6.9   BILITOT 0.2 0.2 0.2        Recent Labs     10/12/20  0511 10/14/20  0452 10/14/20  0453   DDIMER  --   --  2.16*   FERRITIN  --   --  1,214*   CRP  --   --  46.3*   * 579*  --    BNP 33  --   --    TROPONINI <0.006  --   --        All labs within the last 24 hours were reviewed.     Microbiology:  Microbiology Results (last 7 days)     Procedure Component Value Units Date/Time    Blood culture [[id number]] Collected: 10/13/20 1115    Order Status: Completed Specimen: Blood Updated: 10/14/20 1412     Blood Culture, Routine No Growth to date      No Growth to date    Blood culture [[id number]] Collected: 10/13/20 1103    Order Status: Completed Specimen: Blood from Peripheral, Site Unknown Updated: 10/14/20 1412     Blood Culture, Routine No Growth to date      No Growth to date    Blood culture [[id number]] Collected: 10/12/20 0958    Order Status: Completed Specimen: Blood from Peripheral, Right Hand Updated: 10/14/20 1412     Blood Culture, Routine No Growth to date      No Growth to date      No Growth to date    Blood culture [[id number]] Collected: 10/12/20 0952    Order Status: Completed Specimen: Blood from Peripheral, Right Hand Updated: 10/14/20 1412     Blood Culture, Routine No Growth to date      No Growth to  date      No Growth to date    Blood culture x two cultures. Draw prior to antibiotics. [[id number]] Collected: 10/11/20 0815    Order Status: Completed Specimen: Blood from Peripheral, Antecubital, Right Updated: 10/14/20 0903     Blood Culture, Routine No Growth to date      No Growth to date      No Growth to date      No Growth to date    Narrative:      Aerobic and anaerobic    Blood culture x two cultures. Draw prior to antibiotics. [[id number]]  (Abnormal) Collected: 10/11/20 0735    Order Status: Completed Specimen: Blood from Peripheral, Hand, Left Updated: 10/13/20 0809     Blood Culture, Routine Gram stain aer bottle: Gram positive cocci in clusters resembling Staph       Results called to and read back by: Betzy Wolfe 10/12/2020  08:51      COAGULASE-NEGATIVE STAPHYLOCOCCUS SPECIES  Organism is a probable contaminant      Narrative:      Aerobic and anaerobic    Culture, Respiratory with Gram Stain [[id number]]     Order Status: No result Specimen: Respiratory from Endotracheal Aspirate             Imaging  ECG Results          EKG 12-lead (Final result)  Result time 10/13/20 19:55:01    Final result by Interface, Lab In [facility] (10/13/20 19:55:01)                 Narrative:    Test Reason : R00.0,    Vent. Rate : 120 BPM     Atrial Rate : 120 BPM     P-R Int : 146 ms          QRS Dur : 086 ms      QT Int : 316 ms       P-R-T Axes : 045 076 -03 degrees     QTc Int : 446 ms    Sinus tachycardia  Possible Left atrial enlargement  Nonspecific T wave abnormality  Abnormal ECG  When compared with ECG of 07-JUN-2015 08:49,  Significant changes have occurred  Confirmed by Patricia CARRASQUILLO, Magdy ([id number]) on 10/13/2020 7:54:44 PM    Referred By: ALEKSANDAR   SELF           Confirmed By:Magdy Gomez MD                              No results found for this or any previous visit.    X-Ray Chest AP Portable  Narrative: EXAMINATION:  XR CHEST AP PORTABLE    CLINICAL HISTORY:  hypothermia;  COVID-19    TECHNIQUE:  Single frontal view of the chest was performed.    COMPARISON:  10/11/2020, 07:38 hours.  06/09/2015.    FINDINGS:  Patchy heterogeneous opacities are present in both lungs, more conspicuous on the right.  These have shown radiographic progression since 10/11/2020.  Radiographic appearance is consistent with COVID-19 lung disease.    I detect no pleural fluid, pneumothorax, pneumomediastinum or pneumoperitoneum.  Mediastinal structures are midline.    I detect no significant osseous abnormality.  Impression: Abnormal chest radiograph.    Electronically signed by: Alyce Hernandez MD  Date:    10/13/2020  Time:    12:25      All imaging within the last 24 hours was reviewed.     Assessment and Plan:    Active Hospital Problems    Diagnosis  POA    *Pneumonia due to COVID-19 virus [U07.1, J12.89]  Yes    Sinus tachycardia [R00.0]  Yes    Hypoxia [R09.02]  Yes    Hypernatremia [E87.0]  Yes    Abnormal liver enzymes [R74.8]  Yes    Cerebral palsy [G80.9]  Yes      Resolved Hospital Problems   No resolved problems to display.          COVID-19 Virus Infection  Viral Pneumonia due to COVID-19  On empiric Cap coverage:ceftriaxone/rocephin  Covid; on remdesivir/dexamethasone    - COVID-19 testing:   - Isolation: Airborne/Droplet. Surgical mask on patient. Notify Infection Control  - Diagnostics: Trend Q48hrs if stable, more frequently if patient decompensating     Laboratory/Study Frequency Result   CBC Admit & Q48h    CMP Admit & Q48h Na 152--154, LFTs elevated   Magnesium level Admit    D-dimer Admit & Q48h 2.1, 2.16   Ferritin Admit & Q48h 1000, 1214   CRP Admit & Q48h 182.8, 46.3,   CPK Admit & Q24h if elevated 95   LDH Admit & Q48h    Vitamin D Admit    BNP Admit    Troponin Admit & Q6h if elevated    Glucose-6-phos dehydrogenase Admit    Procalcitonin Admit .62   Lipid Panel If using statin    Rapid influenza Admit -   Resp Infection Panel Admit if BMT/organ transplant    Legionella  Antigen Admit ordered   Sputum Culture Admit ordered   Blood Culture Admit 1/2+   Urinalysis & Culture Admit    ECG Admit & Q48h if on HCQ    CXR Admit    Lymphopenia, hyponatremia, hyperferritinemia, elevated troponin, elevated d-dimer, age, and medical comorbidities are significant predictors of poor clinical outcome    - Management: per [facility] COVID Treatment Protocol (4/15/20)    - Monitoring:   - Telemetry & Continuous Pulse Oximetry    - Nutrition:    - Multivitamin PO daily   - Add Boost supplement   - Vitamin D 1000IU daily if deficient   - Ascorbic acid 500mg PO bid    - Supportive Care:   - acetaminophen 650mg PO Q6hr PRN fever/headache   - loperamide PRN viral diarrhea   - IVF if indicated, restrictive strategy preferred, no maintenance IV if able   - VTE PPx: enoxaparin or heparin SQ unless contraindicated    - Antibiotics:  - if indications, CXR findings, elevated procal. See protocol for alternatives.   - Discontinue early if low concern for bacterial co-infection   - ceftriaxone 1g Q24h x 5 days  - azithromycin 500mg po x1, then 250mg po daily x 4 days    - Investigational Therapies:   - If patient meets criteria    Acute Hypoxemic Respiratory Failure  On 4L today, wean as tolerated  - Order RT consult via Respiratory Communication for COVID Protocols  - Order Incentive Spirometer Q4h  - Order Flutter Valve Q4h  - Continuous Pulse Oximetry  - Goal SpO2 92-96%  - Supplemental O2 via LFNC, VentiMask, or HFNC (see Respiratory Support Oxygen Therapies)  - If wheezing, albuterol INH Q6h scheduled & PRN  - Proning Protocol if patient is a candidate (see  Proning Protocol)   - GCS >13, able to self-prone  - If deterioration, may warrant trial of NIPPV and transfer to [facility] pressure room or immediate ICU consult    Advance Care Planning     HyperNA  Unclear if patient has been getting Free water pushes through PEG tube. Spoke with nurse who informed me she will do today.  -continue 175ml  QID  -will  discontinue D5w  -follow up NA in AM     CP   Complications  Spastic/sz/developmentally delayed  Nonverbal, spastic  Continue baclofen  Continue home keppra    Diet: Isosource 1.5 at 40ml/hr for 733ml  Code; Full  Ppx: lovenox  Dispo: back to [facility] if we can wean o2, if not, may need alternative arrangement since does not have ability to support o2 need      VTE High Risk Prophylaxis:   VTE Risk Mitigation (From admission, onward)         Ordered     enoxaparin injection 40 mg  Every 24 hours      10/11/20 1334     IP VTE HIGH RISK PATIENT  Once      10/11/20 1334     Place sequential compression device  Until discontinued      10/11/20 1334                  Subsequent Inpatient Hospital Care  Level 2 [id number]          Lokesh Mishra MD  Hospital Medicine  Pager: [phone]  Spectra; [phone]

## 2020-10-14 NOTE — PLAN OF CARE
This CM spoke with MAISHA at [phone] ([facility]) - updated on patient status - questions answered. Upon discharge from [facility] report on this patient should be called to this number.    Magalys Gold RN  Case Management  Ext [phone]

## 2020-10-14 NOTE — PLAN OF CARE
Pt continues to require left wrist restraint to avoid pt removing tubes and Iv's. Pt nonverbal, father aware of care plan. No incidents during night, pt still able to pull O2 off at times. Pt continues to require 4 LPM O2. Tube feeds per peg tube continued with q4hr flushes. WCTM

## 2020-10-15 LAB
ALBUMIN SERPL BCP-MCNC: 2.2 G/DL (ref 3.5–5.2)
ALP SERPL-CCNC: 210 U/L (ref 55–135)
ALT SERPL W/O P-5'-P-CCNC: 71 U/L (ref 10–44)
ANION GAP SERPL CALC-SCNC: 8 MMOL/L (ref 8–16)
AST SERPL-CCNC: 43 U/L (ref 10–40)
BACTERIA BLD CULT: ABNORMAL
BASOPHILS # BLD AUTO: 0.03 K/UL (ref 0–0.2)
BASOPHILS NFR BLD: 0.7 % (ref 0–1.9)
BILIRUB SERPL-MCNC: 0.2 MG/DL (ref 0.1–1)
BUN SERPL-MCNC: 15 MG/DL (ref 6–20)
CALCIUM SERPL-MCNC: 8.7 MG/DL (ref 8.7–10.5)
CHLORIDE SERPL-SCNC: 110 MMOL/L (ref 95–110)
CO2 SERPL-SCNC: 29 MMOL/L (ref 23–29)
CREAT SERPL-MCNC: 0.5 MG/DL (ref 0.5–1.4)
DIFFERENTIAL METHOD: ABNORMAL
EOSINOPHIL # BLD AUTO: 0 K/UL (ref 0–0.5)
EOSINOPHIL NFR BLD: 0.2 % (ref 0–8)
ERYTHROCYTE [DISTWIDTH] IN BLOOD BY AUTOMATED COUNT: 13.6 % (ref 11.5–14.5)
EST. GFR  (AFRICAN AMERICAN): >60 ML/MIN/1.73 M^2
EST. GFR  (NON AFRICAN AMERICAN): >60 ML/MIN/1.73 M^2
GLUCOSE SERPL-MCNC: 72 MG/DL (ref 70–110)
HCT VFR BLD AUTO: 29.4 % (ref 37–48.5)
HGB BLD-MCNC: 9.4 G/DL (ref 12–16)
IMM GRANULOCYTES # BLD AUTO: 0.15 K/UL (ref 0–0.04)
IMM GRANULOCYTES NFR BLD AUTO: 3.3 % (ref 0–0.5)
LYMPHOCYTES # BLD AUTO: 1.6 K/UL (ref 1–4.8)
LYMPHOCYTES NFR BLD: 35.9 % (ref 18–48)
MAGNESIUM SERPL-MCNC: 1.7 MG/DL (ref 1.6–2.6)
MCH RBC QN AUTO: 29.3 PG (ref 27–31)
MCHC RBC AUTO-ENTMCNC: 32 G/DL (ref 32–36)
MCV RBC AUTO: 92 FL (ref 82–98)
MONOCYTES # BLD AUTO: 0.3 K/UL (ref 0.3–1)
MONOCYTES NFR BLD: 6.6 % (ref 4–15)
NEUTROPHILS # BLD AUTO: 2.4 K/UL (ref 1.8–7.7)
NEUTROPHILS NFR BLD: 53.3 % (ref 38–73)
NRBC BLD-RTO: 1 /100 WBC
PHOSPHATE SERPL-MCNC: 3.6 MG/DL (ref 2.7–4.5)
PLATELET # BLD AUTO: 166 K/UL (ref 150–350)
PMV BLD AUTO: 11.5 FL (ref 9.2–12.9)
POTASSIUM SERPL-SCNC: 3.9 MMOL/L (ref 3.5–5.1)
PROT SERPL-MCNC: 6.4 G/DL (ref 6–8.4)
RBC # BLD AUTO: 3.21 M/UL (ref 4–5.4)
SODIUM SERPL-SCNC: 147 MMOL/L (ref 136–145)
WBC # BLD AUTO: 4.54 K/UL (ref 3.9–12.7)

## 2020-10-15 PROCEDURE — 94761 N-INVAS EAR/PLS OXIMETRY MLT: CPT

## 2020-10-15 PROCEDURE — 99900035 HC TECH TIME PER 15 MIN (STAT)

## 2020-10-15 PROCEDURE — 25000003 PHARM REV CODE 250: Performed by: FAMILY MEDICINE

## 2020-10-15 PROCEDURE — 36415 COLL VENOUS BLD VENIPUNCTURE: CPT

## 2020-10-15 PROCEDURE — 80053 COMPREHEN METABOLIC PANEL: CPT

## 2020-10-15 PROCEDURE — 83735 ASSAY OF MAGNESIUM: CPT

## 2020-10-15 PROCEDURE — 84100 ASSAY OF PHOSPHORUS: CPT

## 2020-10-15 PROCEDURE — 27000221 HC OXYGEN, UP TO 24 HOURS

## 2020-10-15 PROCEDURE — 63600175 PHARM REV CODE 636 W HCPCS: Performed by: FAMILY MEDICINE

## 2020-10-15 PROCEDURE — 20600001 HC STEP DOWN PRIVATE ROOM

## 2020-10-15 PROCEDURE — 63600175 PHARM REV CODE 636 W HCPCS: Performed by: INTERNAL MEDICINE

## 2020-10-15 PROCEDURE — 99233 SBSQ HOSP IP/OBS HIGH 50: CPT | Mod: ,,, | Performed by: HOSPITALIST

## 2020-10-15 PROCEDURE — 25000003 PHARM REV CODE 250: Performed by: INTERNAL MEDICINE

## 2020-10-15 PROCEDURE — 85025 COMPLETE CBC W/AUTO DIFF WBC: CPT

## 2020-10-15 PROCEDURE — 99233 PR SUBSEQUENT HOSPITAL CARE,LEVL III: ICD-10-PCS | Mod: ,,, | Performed by: HOSPITALIST

## 2020-10-15 RX ADMIN — MELATONIN TAB 3 MG 3 MG: 3 TAB at 10:10

## 2020-10-15 RX ADMIN — ENOXAPARIN SODIUM 40 MG: 40 INJECTION SUBCUTANEOUS at 10:10

## 2020-10-15 RX ADMIN — LEVETIRACETAM 1088 MG: 100 SOLUTION ORAL at 09:10

## 2020-10-15 RX ADMIN — BACLOFEN 10 MG: 10 TABLET ORAL at 10:10

## 2020-10-15 RX ADMIN — REMDESIVIR 100 MG: 100 INJECTION, POWDER, LYOPHILIZED, FOR SOLUTION INTRAVENOUS at 04:10

## 2020-10-15 RX ADMIN — THERA TABS 1 TABLET: TAB at 08:10

## 2020-10-15 RX ADMIN — BACLOFEN 10 MG: 10 TABLET ORAL at 03:10

## 2020-10-15 RX ADMIN — CEFTRIAXONE SODIUM 1 G: 1 INJECTION, POWDER, FOR SOLUTION INTRAMUSCULAR; INTRAVENOUS at 08:10

## 2020-10-15 RX ADMIN — BACLOFEN 10 MG: 10 TABLET ORAL at 08:10

## 2020-10-15 RX ADMIN — DEXAMETHASONE SODIUM PHOSPHATE 6 MG: 4 INJECTION INTRA-ARTICULAR; INTRALESIONAL; INTRAMUSCULAR; INTRAVENOUS; SOFT TISSUE at 08:10

## 2020-10-15 RX ADMIN — DIAZEPAM 10 MG: 10 GEL RECTAL at 10:10

## 2020-10-15 NOTE — PLAN OF CARE
Currently not stable for discharge - presently on 5LNC - Last dose REMDESIVIR today - may need alternative discharge plan if necessary to DC with oxygen as [facility] is not able to manage oxygen needs. Will continue to monitor    Magalys Gold RN  Case Management  Ext [phone]       10/15/20 1320   Discharge Reassessment   Assessment Type Discharge Planning Reassessment   Provided patient/caregiver education on the expected discharge date and the discharge plan No  (reviewed with [facility] (MAISHA))   Do you have any problems affording any of your prescribed medications? No   Discharge Plan A Return to nursing home  ([facility])   DME Needed Upon Discharge    (BED/WC BOUND)   Patient choice form signed by patient/caregiver N/A   Anticipated Discharge Disposition [facility]   Can the patient/caregiver answer the patient profile reliably? No, cognitively impaired   Post-Acute Status   Post-Acute Authorization Placement   Post-Acute Placement Status Awaiting Internal Medical Clearance   Discharge Delays None known at this time

## 2020-10-15 NOTE — PLAN OF CARE
Problem: Fall Injury Risk  Goal: Absence of Fall and Fall-Related Injury  Outcome: Ongoing, Progressing     Problem: Restraint, Nonbehavioral (Nonviolent)  Goal: Discontinuation Criteria Achieved  Outcome: Ongoing, Progressing  Goal: Personal Dignity and Safety Maintained  Outcome: Ongoing, Progressing     Problem: Adult Inpatient Plan of Care  Goal: Plan of Care Review  Outcome: Ongoing, Progressing     Patient nonverbal at baseline. Unable to assess patient's mentation. Alert but restless and agitated at start of shift. One time dose of IV Ativan given for agitation - tolerated well. Patient weaned down from 13L HFNC to 5L NC. Monitor shows patient in sinus rhythm to sinus tach. Tele sitter and 1:1 close observer in place. Restraints in use per order. Fall and safety precautions in place. Rounding in place for comfort and safety. Will continue to monitor.    10/14/2020     3014 Patient thrashing in bed, attempting to get out of bed, removing nasal cannula and shaking her head violently. Patient inconsolable despite comfort. Patient desaturated into 80's and patient's HR now in 110's-120's. Respiratory at bedside and placed patient on 13L HFNC. Tube feeds held to prevent aspiration. Med Team R paged and notified of patient's status. One time dose of Ativan given per order. Will continue to monitor.

## 2020-10-15 NOTE — NURSING
"In room turning patient, pt looked off and eyes started rolling to side repeatedly lasting about 5 minutes.   Dr. Mattson notified.   Pt currently laying in bed with eyes closed, appears to be sleeping.     Pt's dad in the room, I asked if the eye rolling happens at home. He said not as often as they used to, maybe montly or so. He said her last "big" siezure was about 2 months ago, states she tenses up and her lips will turn blue   "

## 2020-10-15 NOTE — PROGRESS NOTES
[facility] Medicine  Progress Note  [facility]      Patient Name: Alexander Sainz  MRN:  [id number]  Hospital Medicine Team: [facility] R Vilma Mattson MD  Date of Admission:  10/11/2020     Length of Stay:  LOS: 4 days       Principal Problem:  Pneumonia due to COVID-19 virus      HPI:  30yoF with severe MR, cerebral palsy, spina bifida, chronic transaminitis, chronic hypernatremia, lives at [facility], who presented to  ED with fever 102 at home, and hypoxemic resp failure with sats in the field 88%.  In ED, temp 99, Tachy 119, O2 sats 82% on RA and improved to 90s on 2-3L.  CXR with fairly dense infiltrate in RML - appears bacterial in nature.  Was Tx-ed for bacterial PNA with Rocephin and Azithro. Covid +  and called for transfer to [facility].  One dose of IV Dexa.     Interval History:     Received Ativan for agitation overnight. Down to 3 L NC today. Voices no complaints.     Review of Systems:  Unable to obtain, patient non verbal    Inpatient Medications:    Current Facility-Administered Medications:     baclofen tablet 10 mg, 10 mg, Per G Tube, TID, Augie Calvo MD, 10 mg at 10/15/20 0845    dexamethasone injection 6 mg, 6 mg, Intravenous, Q24H, Augie Calvo MD, 6 mg at 10/15/20 0844    dextrose 50% injection 12.5 g, 12.5 g, Intravenous, PRN, Augie Calvo MD    dextrose 50% injection 25 g, 25 g, Intravenous, PRN, Augie Calvo MD    diazePAM 5-7.5-10 mg (DIASTAT ACUDIAL) rectal kit 20 mg, 20 mg, Rectal, PRN, Augie Calvo MD    enoxaparin injection 40 mg, 40 mg, Subcutaneous, Q24H, Augie Calvo MD, 40 mg at 10/14/20 2246    glucagon (human recombinant) injection 1 mg, 1 mg, Intramuscular, PRN, Augie Calvo MD    lactulose 10 gram/15 mL solution (enema) 200 g, 200 g, Rectal, BID PRN, Augie Calvo MD    levetiracetam oral soln Soln 1,088 mg, 20 mg/kg, Per G Tube, BID, Augie Calvo MD, 1,088 mg at 10/15/20 0916    melatonin tablet 3 mg, 3 mg,  "Per G Tube, Nightly PRN, Augie Calvo MD, 3 mg at 10/14/20 2246    multivitamin tablet, 1 tablet, Oral, Daily, Lokesh Mishra MD, 1 tablet at 10/15/20 0845    remdesivir (EUA) 100 mg in sodium chloride 0.9% 250 mL infusion, 100 mg, Intravenous, Q24H, Augie Calvo MD, Last Rate: 250 mL/hr at 10/14/20 1626, 100 mg at 10/14/20 1626    sodium chloride 0.9% flush 10 mL, 10 mL, Intravenous, PRN, Augie Calvo MD      Physical Exam:      Intake/Output Summary (Last 24 hours) at 10/15/2020 1445  Last data filed at 10/15/2020 0900  Gross per 24 hour   Intake 2250 ml   Output 450 ml   Net 1800 ml     Wt Readings from Last 3 Encounters:   10/14/20 54.4 kg (119 lb 14.9 oz)   10/11/20 54.4 kg (119 lb 14.9 oz)   05/28/19 54.4 kg (120 lb)       /85   Pulse 86   Temp 100 °F (37.8 °C) (Axillary)   Resp 18   Ht 5' 1" (1.549 m)   Wt 54.4 kg (119 lb 14.9 oz)   LMP  (LMP Unknown)   SpO2 97%   Breastfeeding No   BMI 22.66 kg/m²     GEN: NAD, opens eyes to voice  Resp: coarse bilateral breath sounds, no wheezes or rales, normal work of breathing   CV: RRR, no m/r/g, no edema  GI: soft, NTND  Skin: no rash  Neuro: PERRL    Laboratory:  Lab Results   Component Value Date    [id number] Positive (A) 10/11/2020       Recent Labs   Lab 10/13/20  1115 10/14/20  0453 10/15/20  0516   WBC 4.30 3.67* 4.54   LYMPH 20.7  0.9* 29.4  1.1 35.9  1.6   HGB 10.0* 10.2* 9.4*   HCT 31.6* 32.0* 29.4*   * 151 166     Recent Labs   Lab 10/13/20  0251 10/13/20  1103 10/14/20  0452 10/15/20  0516   * 149* 152* 147*   K 3.9 3.8 3.9 3.9   * 113* 114* 110   CO2 26 28 27 29   BUN 18 20 18 15   CREATININE 0.5 0.5 0.6 0.5   GLU 97 168* 112* 72   CALCIUM 8.8 8.9 8.9 8.7   MG 2.1  --  2.0 1.7   PHOS 3.8  --  3.6 3.6     Recent Labs   Lab 10/13/20  1103 10/14/20  0452 10/15/20  0516   ALKPHOS 235* 238* 210*   ALT 94* 82* 71*   AST 68* 52* 43*   ALBUMIN 2.3* 2.4* 2.2*   PROT 6.9 6.9 6.4   BILITOT 0.2 0.2 0.2    "     Recent Labs     10/14/20  0452 10/14/20  0453   DDIMER  --  2.16*   FERRITIN  --  1,214*   CRP  --  46.3*   *  --        All labs within the last 24 hours were reviewed.     Microbiology:  Microbiology Results (last 7 days)     Procedure Component Value Units Date/Time    Blood culture [[id number]] Collected: 10/13/20 1103    Order Status: Completed Specimen: Blood from Peripheral, Site Unknown Updated: 10/15/20 1412     Blood Culture, Routine No Growth to date      No Growth to date      No Growth to date    Blood culture [[id number]] Collected: 10/13/20 1115    Order Status: Completed Specimen: Blood Updated: 10/15/20 1412     Blood Culture, Routine No Growth to date      No Growth to date      No Growth to date    Blood culture [[id number]] Collected: 10/12/20 0958    Order Status: Completed Specimen: Blood from Peripheral, Right Hand Updated: 10/15/20 1412     Blood Culture, Routine No Growth to date      No Growth to date      No Growth to date      No Growth to date    Blood culture [[id number]] Collected: 10/12/20 0952    Order Status: Completed Specimen: Blood from Peripheral, Right Hand Updated: 10/15/20 1412     Blood Culture, Routine No Growth to date      No Growth to date      No Growth to date      No Growth to date    Blood culture x two cultures. Draw prior to antibiotics. [[id number]] Collected: 10/11/20 0815    Order Status: Completed Specimen: Blood from Peripheral, Antecubital, Right Updated: 10/15/20 0903     Blood Culture, Routine No Growth to date      No Growth to date      No Growth to date      No Growth to date      No Growth to date    Narrative:      Aerobic and anaerobic    Blood culture x two cultures. Draw prior to antibiotics. [[id number]]  (Abnormal) Collected: 10/11/20 0735    Order Status: Completed Specimen: Blood from Peripheral, Hand, Left Updated: 10/15/20 0746     Blood Culture, Routine Gram stain aer bottle: Gram positive cocci in clusters resembling Staph       Results  called to and read back by: Betzy Wolfe 10/12/2020  08:51      COAGULASE-NEGATIVE STAPHYLOCOCCUS SPECIES  Organism is a probable contaminant      Narrative:      Aerobic and anaerobic    Culture, Respiratory with Gram Stain [[id number]]     Order Status: No result Specimen: Respiratory from Endotracheal Aspirate             Imaging  ECG Results          EKG 12-lead (Final result)  Result time 10/13/20 19:55:01    Final result by Interface, Lab In [facility] (10/13/20 19:55:01)                 Narrative:    Test Reason : R00.0,    Vent. Rate : 120 BPM     Atrial Rate : 120 BPM     P-R Int : 146 ms          QRS Dur : 086 ms      QT Int : 316 ms       P-R-T Axes : 045 076 -03 degrees     QTc Int : 446 ms    Sinus tachycardia  Possible Left atrial enlargement  Nonspecific T wave abnormality  Abnormal ECG  When compared with ECG of 07-JUN-2015 08:49,  Significant changes have occurred  Confirmed by Magdy Gomez MD ([id number]) on 10/13/2020 7:54:44 PM    Referred By: [email or web address]   SELF           Confirmed By:Magdy Gomez MD                              No results found for this or any previous visit.    X-Ray Chest AP Portable  Narrative: EXAMINATION:  XR CHEST AP PORTABLE    CLINICAL HISTORY:  hypothermia; COVID-19    TECHNIQUE:  Single frontal view of the chest was performed.    COMPARISON:  10/11/2020, 07:38 hours.  06/09/2015.    FINDINGS:  Patchy heterogeneous opacities are present in both lungs, more conspicuous on the right.  These have shown radiographic progression since 10/11/2020.  Radiographic appearance is consistent with COVID-19 lung disease.    I detect no pleural fluid, pneumothorax, pneumomediastinum or pneumoperitoneum.  Mediastinal structures are midline.    I detect no significant osseous abnormality.  Impression: Abnormal chest radiograph.    Electronically signed by: Alyce Hernandez MD  Date:    10/13/2020  Time:    12:25      All imaging within the last 24 hours was reviewed.     Assessment and  Plan:    Active Hospital Problems    Diagnosis  POA    *Pneumonia due to COVID-19 virus [U07.1, J12.89]  Yes    Sinus tachycardia [R00.0]  Yes    Hypoxia [R09.02]  Yes    Hypernatremia [E87.0]  Yes    Abnormal liver enzymes [R74.8]  Yes    Cerebral palsy [G80.9]  Yes      Resolved Hospital Problems   No resolved problems to display.         COVID-19 Virus Infection  Viral Pneumonia due to COVID-19    -Completed CAP coverage w/ abx  -On remdesivir/dexamethasone  - COVID-19 testing:   - Isolation: Airborne/Droplet. Surgical mask on patient. Notify Infection Control  - Diagnostics: Trend Q48hrs if stable, more frequently if patient decompensating     Laboratory/Study Frequency Result   CBC Admit & Q48h    CMP Admit & Q48h LFTs elevated   Magnesium level Admit    D-dimer Admit & Q48h 2.1 > 2.16   Ferritin Admit & Q48h 1000 > 1214   CRP Admit & Q48h 182.8 > 46.3   CPK Admit & Q24h if elevated 95   LDH Admit & Q48h 579   Vitamin D Admit 54   BNP Admit    Troponin Admit & Q6h if elevated    Glucose-6-phos dehydrogenase Admit    Procalcitonin Admit .62   Lipid Panel If using statin    Rapid influenza Admit -   Resp Infection Panel Admit if BMT/organ transplant    Legionella Antigen Admit ordered   Sputum Culture Admit ordered   Blood Culture Admit 1/2+   Urinalysis & Culture Admit    ECG Admit & Q48h if on HCQ    CXR Admit    Lymphopenia, hyponatremia, hyperferritinemia, elevated troponin, elevated d-dimer, age, and medical comorbidities are significant predictors of poor clinical outcome    - Management: per [facility] COVID Treatment Protocol (4/15/20)    - Monitoring:   - Telemetry & Continuous Pulse Oximetry    - Nutrition:    - Multivitamin PO daily   - Add Boost supplement   - Vitamin D 1000IU daily if deficient   - Ascorbic acid 500mg PO bid    - Supportive Care:   - acetaminophen 650mg PO Q6hr PRN fever/headache   - loperamide PRN viral diarrhea   - IVF if indicated, restrictive strategy preferred, no maintenance  IV if able   - VTE PPx: enoxaparin or heparin SQ unless contraindicated    - Antibiotics:  - if indications, CXR findings, elevated procal. See protocol for alternatives.   - Discontinue early if low concern for bacterial co-infection   - ceftriaxone 1g Q24h x 5 days  - azithromycin 500mg po x1, then 250mg po daily x 4 days    - Investigational Therapies:   - If patient meets criteria    Acute Hypoxemic Respiratory Failure  - Order RT consult via Respiratory Communication for COVID Protocols  - Order Incentive Spirometer Q4h  - Order Flutter Valve Q4h  - Continuous Pulse Oximetry  - Goal SpO2 92-96%  - Supplemental O2 via LFNC, VentiMask, or HFNC (see Respiratory Support Oxygen Therapies)  - If wheezing, albuterol INH Q6h scheduled & PRN  - Proning Protocol if patient is a candidate (see [facility] Proning Protocol)   - GCS >13, able to self-prone  - If deterioration, may warrant trial of NIPPV and transfer to [facility] pressure room or immediate ICU consult  - Down to 3 L NC, continue to wean as tolerated    Advance Care Planning     HyperNA  Unclear if patient has been getting free water pushes through PEG tube.   -continue 175ml  QID  -improving  -monitor     CP   Complications  Spastic/sz/developmentally delayed  Nonverbal, spastic  Continue baclofen  Continue home keppra    Diet: Isosource 1.5 at 40ml/hr for 733ml  Code; Full  Ppx: lovenox  Dispo: back to [facility] if we can wean o2, if not, may need alternative arrangement since does not have ability to support o2 need      VTE High Risk Prophylaxis:   VTE Risk Mitigation (From admission, onward)         Ordered     enoxaparin injection 40 mg  Every 24 hours      10/11/20 1334     IP VTE HIGH RISK PATIENT  Once      10/11/20 1334     Place sequential compression device  Until discontinued      10/11/20 1334              Pt has the following problems which are a threat to life and bodily function, and are unstable: acute resp failure, COVID viral PNA    Vilma Mattson  MD  [facility] Medicine Staff  [facility]

## 2020-10-16 LAB
ALBUMIN SERPL BCP-MCNC: 2.3 G/DL (ref 3.5–5.2)
ALP SERPL-CCNC: 200 U/L (ref 55–135)
ALT SERPL W/O P-5'-P-CCNC: 66 U/L (ref 10–44)
ANION GAP SERPL CALC-SCNC: 11 MMOL/L (ref 8–16)
AST SERPL-CCNC: 35 U/L (ref 10–40)
BACTERIA BLD CULT: NORMAL
BASOPHILS # BLD AUTO: 0.02 K/UL (ref 0–0.2)
BASOPHILS NFR BLD: 0.5 % (ref 0–1.9)
BILIRUB SERPL-MCNC: 0.2 MG/DL (ref 0.1–1)
BUN SERPL-MCNC: 15 MG/DL (ref 6–20)
CALCIUM SERPL-MCNC: 9.2 MG/DL (ref 8.7–10.5)
CHLORIDE SERPL-SCNC: 109 MMOL/L (ref 95–110)
CO2 SERPL-SCNC: 26 MMOL/L (ref 23–29)
CREAT SERPL-MCNC: 0.5 MG/DL (ref 0.5–1.4)
CRP SERPL-MCNC: 58.2 MG/L (ref 0–8.2)
D DIMER PPP IA.FEU-MCNC: 1.22 MG/L FEU
DIFFERENTIAL METHOD: ABNORMAL
EOSINOPHIL # BLD AUTO: 0 K/UL (ref 0–0.5)
EOSINOPHIL NFR BLD: 0.3 % (ref 0–8)
ERYTHROCYTE [DISTWIDTH] IN BLOOD BY AUTOMATED COUNT: 13.2 % (ref 11.5–14.5)
EST. GFR  (AFRICAN AMERICAN): >60 ML/MIN/1.73 M^2
EST. GFR  (NON AFRICAN AMERICAN): >60 ML/MIN/1.73 M^2
FERRITIN SERPL-MCNC: 432 NG/ML (ref 20–300)
GLUCOSE SERPL-MCNC: 121 MG/DL (ref 70–110)
HCT VFR BLD AUTO: 29.9 % (ref 37–48.5)
HGB BLD-MCNC: 9.6 G/DL (ref 12–16)
IMM GRANULOCYTES # BLD AUTO: 0.15 K/UL (ref 0–0.04)
IMM GRANULOCYTES NFR BLD AUTO: 3.9 % (ref 0–0.5)
LDH SERPL L TO P-CCNC: 426 U/L (ref 110–260)
LYMPHOCYTES # BLD AUTO: 1.2 K/UL (ref 1–4.8)
LYMPHOCYTES NFR BLD: 29.8 % (ref 18–48)
MAGNESIUM SERPL-MCNC: 1.8 MG/DL (ref 1.6–2.6)
MCH RBC QN AUTO: 29.4 PG (ref 27–31)
MCHC RBC AUTO-ENTMCNC: 32.1 G/DL (ref 32–36)
MCV RBC AUTO: 91 FL (ref 82–98)
MONOCYTES # BLD AUTO: 0.2 K/UL (ref 0.3–1)
MONOCYTES NFR BLD: 4.9 % (ref 4–15)
NEUTROPHILS # BLD AUTO: 2.3 K/UL (ref 1.8–7.7)
NEUTROPHILS NFR BLD: 60.6 % (ref 38–73)
NRBC BLD-RTO: 2 /100 WBC
PHOSPHATE SERPL-MCNC: 3.3 MG/DL (ref 2.7–4.5)
PLATELET # BLD AUTO: 177 K/UL (ref 150–350)
PMV BLD AUTO: 11.5 FL (ref 9.2–12.9)
POTASSIUM SERPL-SCNC: 3.7 MMOL/L (ref 3.5–5.1)
PROCALCITONIN SERPL IA-MCNC: 0.06 NG/ML
PROT SERPL-MCNC: 6.7 G/DL (ref 6–8.4)
RBC # BLD AUTO: 3.27 M/UL (ref 4–5.4)
SODIUM SERPL-SCNC: 146 MMOL/L (ref 136–145)
WBC # BLD AUTO: 3.86 K/UL (ref 3.9–12.7)

## 2020-10-16 PROCEDURE — 27000221 HC OXYGEN, UP TO 24 HOURS

## 2020-10-16 PROCEDURE — 95812 EEG 41-60 MINUTES: CPT

## 2020-10-16 PROCEDURE — 63600175 PHARM REV CODE 636 W HCPCS: Performed by: FAMILY MEDICINE

## 2020-10-16 PROCEDURE — 84145 PROCALCITONIN (PCT): CPT

## 2020-10-16 PROCEDURE — 36415 COLL VENOUS BLD VENIPUNCTURE: CPT

## 2020-10-16 PROCEDURE — 99233 SBSQ HOSP IP/OBS HIGH 50: CPT | Mod: ,,, | Performed by: HOSPITALIST

## 2020-10-16 PROCEDURE — 95812 PR EEG,EXTENDED MONITORING,41-60 MINUTES: ICD-10-PCS | Mod: 26,,, | Performed by: PSYCHIATRY & NEUROLOGY

## 2020-10-16 PROCEDURE — 80177 DRUG SCRN QUAN LEVETIRACETAM: CPT

## 2020-10-16 PROCEDURE — 83735 ASSAY OF MAGNESIUM: CPT

## 2020-10-16 PROCEDURE — 99900035 HC TECH TIME PER 15 MIN (STAT)

## 2020-10-16 PROCEDURE — 94761 N-INVAS EAR/PLS OXIMETRY MLT: CPT

## 2020-10-16 PROCEDURE — 84100 ASSAY OF PHOSPHORUS: CPT

## 2020-10-16 PROCEDURE — 80053 COMPREHEN METABOLIC PANEL: CPT

## 2020-10-16 PROCEDURE — 83615 LACTATE (LD) (LDH) ENZYME: CPT

## 2020-10-16 PROCEDURE — 86140 C-REACTIVE PROTEIN: CPT

## 2020-10-16 PROCEDURE — 95812 EEG 41-60 MINUTES: CPT | Mod: 26,,, | Performed by: PSYCHIATRY & NEUROLOGY

## 2020-10-16 PROCEDURE — 85025 COMPLETE CBC W/AUTO DIFF WBC: CPT

## 2020-10-16 PROCEDURE — 20600001 HC STEP DOWN PRIVATE ROOM

## 2020-10-16 PROCEDURE — 82728 ASSAY OF FERRITIN: CPT

## 2020-10-16 PROCEDURE — 25000003 PHARM REV CODE 250: Performed by: INTERNAL MEDICINE

## 2020-10-16 PROCEDURE — 25000003 PHARM REV CODE 250: Performed by: FAMILY MEDICINE

## 2020-10-16 PROCEDURE — 85379 FIBRIN DEGRADATION QUANT: CPT

## 2020-10-16 PROCEDURE — 99233 PR SUBSEQUENT HOSPITAL CARE,LEVL III: ICD-10-PCS | Mod: ,,, | Performed by: HOSPITALIST

## 2020-10-16 RX ADMIN — LEVETIRACETAM 1088 MG: 100 SOLUTION ORAL at 10:10

## 2020-10-16 RX ADMIN — ENOXAPARIN SODIUM 40 MG: 40 INJECTION SUBCUTANEOUS at 10:10

## 2020-10-16 RX ADMIN — DEXAMETHASONE SODIUM PHOSPHATE 6 MG: 4 INJECTION INTRA-ARTICULAR; INTRALESIONAL; INTRAMUSCULAR; INTRAVENOUS; SOFT TISSUE at 08:10

## 2020-10-16 RX ADMIN — THERA TABS 1 TABLET: TAB at 08:10

## 2020-10-16 RX ADMIN — BACLOFEN 10 MG: 10 TABLET ORAL at 08:10

## 2020-10-16 RX ADMIN — BACLOFEN 10 MG: 10 TABLET ORAL at 02:10

## 2020-10-16 RX ADMIN — BACLOFEN 10 MG: 10 TABLET ORAL at 10:10

## 2020-10-16 RX ADMIN — LEVETIRACETAM 1088 MG: 100 SOLUTION ORAL at 12:10

## 2020-10-16 NOTE — NURSING
PAGED MED TEAM R TO REPORT LOW  HEART RATE  APROX 40 MINS  PRIOR TO THIS NOTE, PT HAS RECTAL TEMP OF 94.6, WARMING BLANKET REAPPLIED TO PT, STILL AWAITING FOR TEAM R TO RESPOND TO INITIAL PAGE

## 2020-10-16 NOTE — PROGRESS NOTES
[facility] Medicine  Progress Note  [facility]      Patient Name: Alexander Sianz  MRN:  [id number]  Hospital Medicine Team: [facility] MED R Vilma Mattson MD  Date of Admission:  10/11/2020     Length of Stay:  LOS: 5 days       Principal Problem:  Pneumonia due to COVID-19 virus      HPI:  30yoF with severe MR, cerebral palsy, spina bifida, chronic transaminitis, chronic hypernatremia, lives at [facility], who presented to  ED with fever 102 at home, and hypoxemic resp failure with sats in the field 88%.  In ED, temp 99, Tachy 119, O2 sats 82% on RA and improved to 90s on 2-3L.  CXR with fairly dense infiltrate in RML - appears bacterial in nature.  Was Tx-ed for bacterial PNA with Rocephin and Azithro. Covid +  and called for transfer to [facility].  One dose of IV Dexa.     Interval History:     Patient had rhythmic eye rolling x 15 minutes overnight. Similar episode happened during day shift. Resolved after Valium administered. Neurology consulted.     Review of Systems:  Unable to obtain, patient non verbal    Inpatient Medications:    Current Facility-Administered Medications:     baclofen tablet 10 mg, 10 mg, Per G Tube, TID, Augie Calvo MD, 10 mg at 10/16/20 0822    dexamethasone injection 6 mg, 6 mg, Intravenous, Q24H, Augie Calvo MD, 6 mg at 10/16/20 0822    dextrose 50% injection 12.5 g, 12.5 g, Intravenous, PRN, Augie Calvo MD    dextrose 50% injection 25 g, 25 g, Intravenous, PRN, Augie Calvo MD    diazePAM 5-7.5-10 mg (DIASTAT ACUDIAL) rectal kit 20 mg, 20 mg, Rectal, PRN, Augie Calvo MD, 10 mg at 10/15/20 2225    enoxaparin injection 40 mg, 40 mg, Subcutaneous, Q24H, Augie Calvo MD, 40 mg at 10/15/20 2225    glucagon (human recombinant) injection 1 mg, 1 mg, Intramuscular, PRN, Augie Calvo MD    lactulose 10 gram/15 mL solution (enema) 200 g, 200 g, Rectal, BID PRN, Augie Calvo MD    levetiracetam oral soln Soln 1,088 mg, 20 mg/kg,  "Per G Tube, BID, Augie Calvo MD, 1,088 mg at 10/16/20 1000    melatonin tablet 3 mg, 3 mg, Per G Tube, Nightly PRN, Augie Calvo MD, 3 mg at 10/15/20 2224    multivitamin tablet, 1 tablet, Oral, Daily, Lokesh Mishra MD, 1 tablet at 10/16/20 0823    sodium chloride 0.9% flush 10 mL, 10 mL, Intravenous, PRN, Augie Calvo MD      Physical Exam:      Intake/Output Summary (Last 24 hours) at 10/16/2020 1256  Last data filed at 10/16/2020 0700  Gross per 24 hour   Intake 1255 ml   Output 975 ml   Net 280 ml     Wt Readings from Last 3 Encounters:   10/16/20 53.6 kg (118 lb 2.7 oz)   10/11/20 54.4 kg (119 lb 14.9 oz)   05/28/19 54.4 kg (120 lb)       /69   Pulse (!) 55   Temp 96.6 °F (35.9 °C) (Rectal)   Resp 16   Ht 5' 1" (1.549 m)   Wt 53.6 kg (118 lb 2.7 oz)   LMP  (LMP Unknown)   SpO2 95%   Breastfeeding No   BMI 22.33 kg/m²     GEN: NAD, opens eyes to sternal rub  Resp: coarse bilateral breath sounds, no wheezes or rales, normal work of breathing   CV: RRR, no m/r/g, no edema  GI: soft, NTND  Skin: no rash  Neuro: PERRL    Laboratory:  Lab Results   Component Value Date    [id number] Positive (A) 10/11/2020       Recent Labs   Lab 10/14/20  0453 10/15/20  0516 10/16/20  0246   WBC 3.67* 4.54 3.86*   LYMPH 29.4  1.1 35.9  1.6 29.8  1.2   HGB 10.2* 9.4* 9.6*   HCT 32.0* 29.4* 29.9*    166 177     Recent Labs   Lab 10/14/20  0452 10/15/20  0516 10/16/20  0246   * 147* 146*   K 3.9 3.9 3.7   * 110 109   CO2 27 29 26   BUN 18 15 15   CREATININE 0.6 0.5 0.5   * 72 121*   CALCIUM 8.9 8.7 9.2   MG 2.0 1.7 1.8   PHOS 3.6 3.6 3.3     Recent Labs   Lab 10/14/20  0452 10/15/20  0516 10/16/20  0246   ALKPHOS 238* 210* 200*   ALT 82* 71* 66*   AST 52* 43* 35   ALBUMIN 2.4* 2.2* 2.3*   PROT 6.9 6.4 6.7   BILITOT 0.2 0.2 0.2        Recent Labs     10/14/20  0452 10/14/20  0453 10/16/20  0246   DDIMER  --  2.16* 1.22*   FERRITIN  --  1,214* 432*   CRP  --  46.3* 58.2* "   *  --  426*       All labs within the last 24 hours were reviewed.     Microbiology:  Microbiology Results (last 7 days)     Procedure Component Value Units Date/Time    Blood culture x two cultures. Draw prior to antibiotics. [[id number]] Collected: 10/11/20 0815    Order Status: Completed Specimen: Blood from Peripheral, Antecubital, Right Updated: 10/16/20 0903     Blood Culture, Routine No growth after 5 days.    Narrative:      Aerobic and anaerobic    Blood culture [[id number]] Collected: 10/13/20 1103    Order Status: Completed Specimen: Blood from Peripheral, Site Unknown Updated: 10/15/20 1412     Blood Culture, Routine No Growth to date      No Growth to date      No Growth to date    Blood culture [[id number]] Collected: 10/13/20 1115    Order Status: Completed Specimen: Blood Updated: 10/15/20 1412     Blood Culture, Routine No Growth to date      No Growth to date      No Growth to date    Blood culture [[id number]] Collected: 10/12/20 0958    Order Status: Completed Specimen: Blood from Peripheral, Right Hand Updated: 10/15/20 1412     Blood Culture, Routine No Growth to date      No Growth to date      No Growth to date      No Growth to date    Blood culture [[id number]] Collected: 10/12/20 0952    Order Status: Completed Specimen: Blood from Peripheral, Right Hand Updated: 10/15/20 1412     Blood Culture, Routine No Growth to date      No Growth to date      No Growth to date      No Growth to date    Blood culture x two cultures. Draw prior to antibiotics. [[id number]]  (Abnormal) Collected: 10/11/20 0735    Order Status: Completed Specimen: Blood from Peripheral, Hand, Left Updated: 10/15/20 0746     Blood Culture, Routine Gram stain aer bottle: Gram positive cocci in clusters resembling Staph       Results called to and read back by: Betzy Wolfe 10/12/2020  08:51      COAGULASE-NEGATIVE STAPHYLOCOCCUS SPECIES  Organism is a probable contaminant      Narrative:      Aerobic and anaerobic     Culture, Respiratory with Gram Stain [[id number]]     Order Status: No result Specimen: Respiratory from Endotracheal Aspirate             Imaging  ECG Results          EKG 12-lead (Final result)  Result time 10/13/20 19:55:01    Final result by Interface, Lab In [facility] (10/13/20 19:55:01)                 Narrative:    Test Reason : R00.0,    Vent. Rate : 120 BPM     Atrial Rate : 120 BPM     P-R Int : 146 ms          QRS Dur : 086 ms      QT Int : 316 ms       P-R-T Axes : 045 076 -03 degrees     QTc Int : 446 ms    Sinus tachycardia  Possible Left atrial enlargement  Nonspecific T wave abnormality  Abnormal ECG  When compared with ECG of 07-JUN-2015 08:49,  Significant changes have occurred  Confirmed by Magdy Gomez MD ([id number]) on 10/13/2020 7:54:44 PM    Referred By: ALEKSANDAR   SELF           Confirmed By:Magdy Gomez MD                              No results found for this or any previous visit.    X-Ray Chest AP Portable  Narrative: EXAMINATION:  XR CHEST AP PORTABLE    CLINICAL HISTORY:  hypothermia; COVID-19    TECHNIQUE:  Single frontal view of the chest was performed.    COMPARISON:  10/11/2020, 07:38 hours.  06/09/2015.    FINDINGS:  Patchy heterogeneous opacities are present in both lungs, more conspicuous on the right.  These have shown radiographic progression since 10/11/2020.  Radiographic appearance is consistent with COVID-19 lung disease.    I detect no pleural fluid, pneumothorax, pneumomediastinum or pneumoperitoneum.  Mediastinal structures are midline.    I detect no significant osseous abnormality.  Impression: Abnormal chest radiograph.    Electronically signed by: Alyce Hernandez MD  Date:    10/13/2020  Time:    12:25      All imaging within the last 24 hours was reviewed.     Assessment and Plan:    Active Hospital Problems    Diagnosis  POA    *Pneumonia due to COVID-19 virus [U07.1, J12.89]  Yes    Sinus tachycardia [R00.0]  Yes    Hypoxia [R09.02]  Yes    Hypernatremia  [E87.0]  Yes    Abnormal liver enzymes [R74.8]  Yes    Cerebral palsy [G80.9]  Yes      Resolved Hospital Problems   No resolved problems to display.         COVID-19 Virus Infection  Viral Pneumonia due to COVID-19    -Completed CAP coverage w/ abx  -On remdesivir/dexamethasone  - COVID-19 testing:   - Isolation: Airborne/Droplet. Surgical mask on patient. Notify Infection Control  - Diagnostics: Trend Q48hrs if stable, more frequently if patient decompensating     Laboratory/Study Frequency Result   CBC Admit & Q48h    CMP Admit & Q48h LFTs elevated   Magnesium level Admit    D-dimer Admit & Q48h 2.1 > 2.16 > 1.2   Ferritin Admit & Q48h 1000 > 1214 > 432   CRP Admit & Q48h 182.8 > 46.3 > 58   CPK Admit & Q24h if elevated 95   LDH Admit & Q48h 579 > 426   Vitamin D Admit 54   BNP Admit    Troponin Admit & Q6h if elevated    Glucose-6-phos dehydrogenase Admit    Procalcitonin Admit .62   Lipid Panel If using statin    Rapid influenza Admit -   Resp Infection Panel Admit if BMT/organ transplant    Legionella Antigen Admit ordered   Sputum Culture Admit ordered   Blood Culture Admit 1/2+   Urinalysis & Culture Admit    ECG Admit & Q48h if on HCQ    CXR Admit    Lymphopenia, hyponatremia, hyperferritinemia, elevated troponin, elevated d-dimer, age, and medical comorbidities are significant predictors of poor clinical outcome    - Management: per [facility] COVID Treatment Protocol (4/15/20)    - Monitoring:   - Telemetry & Continuous Pulse Oximetry    - Nutrition:    - Multivitamin PO daily   - Add Boost supplement   - Vitamin D 1000IU daily if deficient   - Ascorbic acid 500mg PO bid    - Supportive Care:   - acetaminophen 650mg PO Q6hr PRN fever/headache   - loperamide PRN viral diarrhea   - IVF if indicated, restrictive strategy preferred, no maintenance IV if able   - VTE PPx: enoxaparin or heparin SQ unless contraindicated    - Antibiotics:  - if indications, CXR findings, elevated procal. See protocol for  alternatives.   - Discontinue early if low concern for bacterial co-infection   - ceftriaxone 1g Q24h x 5 days  - azithromycin 500mg po x1, then 250mg po daily x 4 days    - Investigational Therapies:   - If patient meets criteria    Acute Hypoxemic Respiratory Failure  - Order RT consult via Respiratory Communication for COVID Protocols  - Order Incentive Spirometer Q4h  - Order Flutter Valve Q4h  - Continuous Pulse Oximetry  - Goal SpO2 92-96%  - Supplemental O2 via LFNC, VentiMask, or HFNC (see Respiratory Support Oxygen Therapies)  - If wheezing, albuterol INH Q6h scheduled & PRN  - Proning Protocol if patient is a candidate (see  Proning Protocol)   - GCS >13, able to self-prone  - If deterioration, may warrant trial of NIPPV and transfer to [facility] pressure room or immediate ICU consult  - Down to 1 L NC, continue to wean as tolerated    Advance Care Planning     HyperNA  Unclear if patient has been getting free water pushes through PEG tube.   -continue 175ml  QID  -improving  -monitor     CP   Complications  Spastic/sz/developmentally delayed  Nonverbal, spastic  Continue baclofen  Continue home keppra    Seizure  Rhythmic eye rolling c/f seizure. Resolved after Valium given.   Discuss w/ Neurology.   Seizure precautions.    Diet: Isosource 1.5 at 40ml/hr for 733ml  Code; Full  Ppx: lovenox  Dispo: back to [facility] if we can wean o2, if not, may need alternative arrangement since does not have ability to support o2 need      VTE High Risk Prophylaxis:   VTE Risk Mitigation (From admission, onward)         Ordered     enoxaparin injection 40 mg  Every 24 hours      10/11/20 1334     IP VTE HIGH RISK PATIENT  Once      10/11/20 1334     Place sequential compression device  Until discontinued      10/11/20 1334              Pt has the following problems which are a threat to life and bodily function, and are unstable: acute resp failure, COVID viral PNA    Vilma Mattson MD  Hospital Medicine Staff  [facility]

## 2020-10-16 NOTE — NURSING
Report received, Night RN, Luther, stated that pt had seizure like activity again last night. Reported rhythmic eye rolling for about 15 min, RN administered PRN valium. After valium administered RN stated pt slept and did not notice any more seizure like activity. Magdy informed me that he paged MD overnight but did not get a call back. Also stated that Pt had a rectal temp of 94 requiring warming blanket again.   Dr. Mattson messaged and notified of the above.

## 2020-10-17 LAB
BACTERIA BLD CULT: NORMAL
BACTERIA BLD CULT: NORMAL

## 2020-10-17 PROCEDURE — 27000221 HC OXYGEN, UP TO 24 HOURS

## 2020-10-17 PROCEDURE — 25000003 PHARM REV CODE 250: Performed by: INTERNAL MEDICINE

## 2020-10-17 PROCEDURE — 99900035 HC TECH TIME PER 15 MIN (STAT)

## 2020-10-17 PROCEDURE — 20600001 HC STEP DOWN PRIVATE ROOM

## 2020-10-17 PROCEDURE — 63600175 PHARM REV CODE 636 W HCPCS: Performed by: FAMILY MEDICINE

## 2020-10-17 PROCEDURE — 99232 PR SUBSEQUENT HOSPITAL CARE,LEVL II: ICD-10-PCS | Mod: ,,, | Performed by: HOSPITALIST

## 2020-10-17 PROCEDURE — 94761 N-INVAS EAR/PLS OXIMETRY MLT: CPT

## 2020-10-17 PROCEDURE — 25000003 PHARM REV CODE 250: Performed by: FAMILY MEDICINE

## 2020-10-17 PROCEDURE — 99232 SBSQ HOSP IP/OBS MODERATE 35: CPT | Mod: ,,, | Performed by: HOSPITALIST

## 2020-10-17 RX ADMIN — BACLOFEN 10 MG: 10 TABLET ORAL at 03:10

## 2020-10-17 RX ADMIN — BACLOFEN 10 MG: 10 TABLET ORAL at 10:10

## 2020-10-17 RX ADMIN — LEVETIRACETAM 1088 MG: 100 SOLUTION ORAL at 01:10

## 2020-10-17 RX ADMIN — LEVETIRACETAM 1088 MG: 100 SOLUTION ORAL at 08:10

## 2020-10-17 RX ADMIN — ENOXAPARIN SODIUM 40 MG: 40 INJECTION SUBCUTANEOUS at 10:10

## 2020-10-17 RX ADMIN — LEVETIRACETAM 1088 MG: 100 SOLUTION ORAL at 10:10

## 2020-10-17 RX ADMIN — DIAZEPAM 10 MG: 10 GEL RECTAL at 01:10

## 2020-10-17 RX ADMIN — DEXAMETHASONE SODIUM PHOSPHATE 6 MG: 4 INJECTION INTRA-ARTICULAR; INTRALESIONAL; INTRAMUSCULAR; INTRAVENOUS; SOFT TISSUE at 08:10

## 2020-10-17 RX ADMIN — MELATONIN TAB 3 MG 3 MG: 3 TAB at 10:10

## 2020-10-17 RX ADMIN — THERA TABS 1 TABLET: TAB at 08:10

## 2020-10-17 RX ADMIN — BACLOFEN 10 MG: 10 TABLET ORAL at 08:10

## 2020-10-17 NOTE — PLAN OF CARE
Plan to do MRI epilepsy protocol today postpone due to patient's hypoxemia.  Will attempt to do it tomorrow if feasible.  For other neurology recommendations, please see our full note documented on 10/16/2020.  Case discussed with staff/Dr. Lua.      Beena Goodman MD  Neurology resident, PGY-3  Department of Neurology  [facility]

## 2020-10-17 NOTE — NURSING
order noted for MRI, MRI tech notified that her dad will need to be contacted if there is a checklist that needs to be completed. Provided MRI with phone number, was told they would contact him shortly.  AARON agee informed me she will not be able to complete MRI until later today

## 2020-10-17 NOTE — PLAN OF CARE
Pt stayed wildly agitated, pt continually punched herself and tried to remove restraining mitts with her mouth,Dr. Elbert Matias allowed 10 mg diazapam rectally for agitation once, pt has remained calm since administration of medication

## 2020-10-17 NOTE — PROGRESS NOTES
[facility] Medicine  Progress Note  [facility]      Patient Name: Alexander Sainz  MRN:  [id number]  Hospital Medicine Team: [facility] HOSP MED R Vilma Mattson MD  Date of Admission:  10/11/2020     Length of Stay:  LOS: 6 days       Principal Problem:  Pneumonia due to COVID-19 virus      HPI:  30yoF with severe MR, cerebral palsy, spina bifida, chronic transaminitis, chronic hypernatremia, lives at [facility], who presented to  ED with fever 102 at home, and hypoxemic resp failure with sats in the field 88%.  In ED, temp 99, Tachy 119, O2 sats 82% on RA and improved to 90s on 2-3L.  CXR with fairly dense infiltrate in RML - appears bacterial in nature.  Was Tx-ed for bacterial PNA with Rocephin and Azithro. Covid +  and called for transfer to [facility].  One dose of IV Dexa.     Interval History:     No further abnormal eye movements reported. Does well on room air when not agitated. Agitation overnight, punching herself. Received Valium.     Review of Systems:  Unable to obtain, patient non verbal    Inpatient Medications:    Current Facility-Administered Medications:     baclofen tablet 10 mg, 10 mg, Per G Tube, TID, Augie Calvo MD, 10 mg at 10/17/20 0843    dexamethasone injection 6 mg, 6 mg, Intravenous, Q24H, Augie Calvo MD, 6 mg at 10/17/20 0841    dextrose 50% injection 12.5 g, 12.5 g, Intravenous, PRN, Augie Calvo MD    dextrose 50% injection 25 g, 25 g, Intravenous, PRN, Augie Calvo MD    diazePAM 5-7.5-10 mg (DIASTAT ACUDIAL) rectal kit 20 mg, 20 mg, Rectal, PRN, Augie Calvo MD, 10 mg at 10/17/20 0110    enoxaparin injection 40 mg, 40 mg, Subcutaneous, Q24H, Augie Calvo MD, 40 mg at 10/16/20 2233    glucagon (human recombinant) injection 1 mg, 1 mg, Intramuscular, PRN, Augie Calvo MD    lactulose 10 gram/15 mL solution (enema) 200 g, 200 g, Rectal, BID PRN, Augie Calvo MD    levetiracetam oral soln Soln 1,088 mg, 20 mg/kg, Per G Tube, BID,  "Augie Calvo MD, 1,088 mg at 10/17/20 0840    melatonin tablet 3 mg, 3 mg, Per G Tube, Nightly PRN, Augie Calvo MD, 3 mg at 10/15/20 2224    multivitamin tablet, 1 tablet, Oral, Daily, Lokesh Mishra MD, 1 tablet at 10/17/20 0843    sodium chloride 0.9% flush 10 mL, 10 mL, Intravenous, PRN, Augie Calvo MD      Physical Exam:      Intake/Output Summary (Last 24 hours) at 10/17/2020 1206  Last data filed at 10/17/2020 0728  Gross per 24 hour   Intake 1180 ml   Output 1000 ml   Net 180 ml     Wt Readings from Last 3 Encounters:   10/16/20 53.6 kg (118 lb 2.7 oz)   10/11/20 54.4 kg (119 lb 14.9 oz)   05/28/19 54.4 kg (120 lb)       /66   Pulse 65   Temp 97.5 °F (36.4 °C) (Axillary)   Resp 18   Ht 5' 1" (1.549 m)   Wt 53.6 kg (118 lb 2.7 oz)   LMP  (LMP Unknown)   SpO2 (!) 94%   Breastfeeding No   BMI 22.33 kg/m²     GEN: NAD, opens eyes to sternal rub  Resp: coarse bilateral breath sounds, no wheezes or rales, normal work of breathing   CV: RRR, no m/r/g, no edema  GI: soft, NTND  Skin: no rash  Neuro: PERRL    Laboratory:  Lab Results   Component Value Date    [id number] Positive (A) 10/11/2020       Recent Labs   Lab 10/14/20  0453 10/15/20  0516 10/16/20  0246   WBC 3.67* 4.54 3.86*   LYMPH 29.4  1.1 35.9  1.6 29.8  1.2   HGB 10.2* 9.4* 9.6*   HCT 32.0* 29.4* 29.9*    166 177     Recent Labs   Lab 10/14/20  0452 10/15/20  0516 10/16/20  0246   * 147* 146*   K 3.9 3.9 3.7   * 110 109   CO2 27 29 26   BUN 18 15 15   CREATININE 0.6 0.5 0.5   * 72 121*   CALCIUM 8.9 8.7 9.2   MG 2.0 1.7 1.8   PHOS 3.6 3.6 3.3     Recent Labs   Lab 10/14/20  0452 10/15/20  0516 10/16/20  0246   ALKPHOS 238* 210* 200*   ALT 82* 71* 66*   AST 52* 43* 35   ALBUMIN 2.4* 2.2* 2.3*   PROT 6.9 6.4 6.7   BILITOT 0.2 0.2 0.2        Recent Labs     10/16/20  0246   DDIMER 1.22*   FERRITIN 432*   CRP 58.2*   *       All labs within the last 24 hours were reviewed. "     Microbiology:  Microbiology Results (last 7 days)     Procedure Component Value Units Date/Time    Blood culture [[id number]] Collected: 10/13/20 1115    Order Status: Completed Specimen: Blood Updated: 10/16/20 1412     Blood Culture, Routine No Growth to date      No Growth to date      No Growth to date      No Growth to date    Blood culture [[id number]] Collected: 10/13/20 1103    Order Status: Completed Specimen: Blood from Peripheral, Site Unknown Updated: 10/16/20 1412     Blood Culture, Routine No Growth to date      No Growth to date      No Growth to date      No Growth to date    Blood culture [[id number]] Collected: 10/12/20 0958    Order Status: Completed Specimen: Blood from Peripheral, Right Hand Updated: 10/16/20 1412     Blood Culture, Routine No Growth to date      No Growth to date      No Growth to date      No Growth to date      No Growth to date    Blood culture [[id number]] Collected: 10/12/20 0952    Order Status: Completed Specimen: Blood from Peripheral, Right Hand Updated: 10/16/20 1412     Blood Culture, Routine No Growth to date      No Growth to date      No Growth to date      No Growth to date      No Growth to date    Blood culture x two cultures. Draw prior to antibiotics. [[id number]] Collected: 10/11/20 0815    Order Status: Completed Specimen: Blood from Peripheral, Antecubital, Right Updated: 10/16/20 0903     Blood Culture, Routine No growth after 5 days.    Narrative:      Aerobic and anaerobic    Blood culture x two cultures. Draw prior to antibiotics. [[id number]]  (Abnormal) Collected: 10/11/20 0735    Order Status: Completed Specimen: Blood from Peripheral, Hand, Left Updated: 10/15/20 0746     Blood Culture, Routine Gram stain aer bottle: Gram positive cocci in clusters resembling Staph       Results called to and read back by: Betzy Wolfe 10/12/2020  08:51      COAGULASE-NEGATIVE STAPHYLOCOCCUS SPECIES  Organism is a probable contaminant      Narrative:      Aerobic  and anaerobic    Culture, Respiratory with Gram Stain [[id number]]     Order Status: No result Specimen: Respiratory from Endotracheal Aspirate             Imaging  ECG Results          EKG 12-lead (Final result)  Result time 10/13/20 19:55:01    Final result by Interface, Lab In [facility] (10/13/20 19:55:01)                 Narrative:    Test Reason : R00.0,    Vent. Rate : 120 BPM     Atrial Rate : 120 BPM     P-R Int : 146 ms          QRS Dur : 086 ms      QT Int : 316 ms       P-R-T Axes : 045 076 -03 degrees     QTc Int : 446 ms    Sinus tachycardia  Possible Left atrial enlargement  Nonspecific T wave abnormality  Abnormal ECG  When compared with ECG of 07-JUN-2015 08:49,  Significant changes have occurred  Confirmed by Magdy Gomez MD ([id number]) on 10/13/2020 7:54:44 PM    Referred By: ALEKSANDAR   SELF           Confirmed By:Magdy Gomez MD                              No results found for this or any previous visit.    X-Ray Chest AP Portable  Narrative: EXAMINATION:  XR CHEST AP PORTABLE    CLINICAL HISTORY:  hypothermia; COVID-19    TECHNIQUE:  Single frontal view of the chest was performed.    COMPARISON:  10/11/2020, 07:38 hours.  06/09/2015.    FINDINGS:  Patchy heterogeneous opacities are present in both lungs, more conspicuous on the right.  These have shown radiographic progression since 10/11/2020.  Radiographic appearance is consistent with COVID-19 lung disease.    I detect no pleural fluid, pneumothorax, pneumomediastinum or pneumoperitoneum.  Mediastinal structures are midline.    I detect no significant osseous abnormality.  Impression: Abnormal chest radiograph.    Electronically signed by: Alyce Hernandez MD  Date:    10/13/2020  Time:    12:25      All imaging within the last 24 hours was reviewed.     Assessment and Plan:    Active Hospital Problems    Diagnosis  POA    *Pneumonia due to COVID-19 virus [U07.1, J12.89]  Yes    Sinus tachycardia [R00.0]  Yes    Hypoxia [R09.02]  Yes     Hypernatremia [E87.0]  Yes    Abnormal liver enzymes [R74.8]  Yes    Cerebral palsy [G80.9]  Yes      Resolved Hospital Problems   No resolved problems to display.         COVID-19 Virus Infection  Viral Pneumonia due to COVID-19    -Completed CAP coverage w/ abx  -On remdesivir/dexamethasone  - COVID-19 testing:   - Isolation: Airborne/Droplet. Surgical mask on patient. Notify Infection Control  - Diagnostics: Trend Q48hrs if stable, more frequently if patient decompensating     Laboratory/Study Frequency Result   CBC Admit & Q48h    CMP Admit & Q48h LFTs elevated   Magnesium level Admit    D-dimer Admit & Q48h 2.1 > 2.16 > 1.2   Ferritin Admit & Q48h 1000 > 1214 > 432   CRP Admit & Q48h 182.8 > 46.3 > 58   CPK Admit & Q24h if elevated 95   LDH Admit & Q48h 579 > 426   Vitamin D Admit 54   BNP Admit    Troponin Admit & Q6h if elevated    Glucose-6-phos dehydrogenase Admit    Procalcitonin Admit .62   Lipid Panel If using statin    Rapid influenza Admit -   Resp Infection Panel Admit if BMT/organ transplant    Legionella Antigen Admit ordered   Sputum Culture Admit ordered   Blood Culture Admit 1/2+   Urinalysis & Culture Admit    ECG Admit & Q48h if on HCQ    CXR Admit    Lymphopenia, hyponatremia, hyperferritinemia, elevated troponin, elevated d-dimer, age, and medical comorbidities are significant predictors of poor clinical outcome    - [facility] ([facility])    - Monitoring:   - Telemetry & Continuous Pulse Oximetry    - Nutrition:    - Multivitamin PO daily   - Add Boost supplement   - Vitamin D 1000IU daily if deficient   - Ascorbic acid 500mg PO bid    - Supportive Care:   - acetaminophen 650mg PO Q6hr PRN fever/headache   - loperamide PRN viral diarrhea   - IVF if indicated, restrictive strategy preferred, no maintenance IV if able   - VTE PPx: enoxaparin or heparin SQ unless contraindicated    - Antibiotics:  - if indications, CXR findings, elevated procal. See  protocol for alternatives.   - Discontinue early if low concern for bacterial co-infection   - ceftriaxone 1g Q24h x 5 days  - azithromycin 500mg po x1, then 250mg po daily x 4 days    - Investigational Therapies:   - If patient meets criteria    Acute Hypoxemic Respiratory Failure  - Order RT consult via Respiratory Communication for COVID Protocols  - Order Incentive Spirometer Q4h  - Order Flutter Valve Q4h  - Continuous Pulse Oximetry  - Goal SpO2 92-96%  - Supplemental O2 via LFNC, VentiMask, or HFNC (see Respiratory Support Oxygen Therapies)  - If wheezing, albuterol INH Q6h scheduled & PRN  - Proning Protocol if patient is a candidate (see HM Proning Protocol)   - GCS >13, able to self-prone  - If deterioration, may warrant trial of NIPPV and transfer to [facility] pressure room or immediate ICU consult  - Now on room air    Advance Care Planning     HyperNA  Unclear if patient has been getting free water pushes through PEG tube.   -continue 175ml  QID  -improving  -monitor     CP   Complications  Spastic/sz/developmentally delayed  Nonverbal, spastic  Continue baclofen  Continue home keppra    Seizure  Had rhythmic eye rolling c/f seizure. Resolved after Valium given.   Discuss w/ Neurology.   Seizure precautions.    Diet: Isosource 1.5 at 40ml/hr for 733ml  Code; Full  Ppx: lovenox  Dispo: back to [facility] if we can wean o2, if not, may need alternative arrangement since does not have ability to support o2 need      VTE High Risk Prophylaxis:   VTE Risk Mitigation (From admission, onward)         Ordered     enoxaparin injection 40 mg  Every 24 hours      10/11/20 1334     IP VTE HIGH RISK PATIENT  Once      10/11/20 1334     Place sequential compression device  Until discontinued      10/11/20 1334              Pt has the following problems which are a threat to life and bodily function, and are unstable: acute resp failure, COVID viral PNA    Vilma Mattson MD  Hospital Medicine Staff  [facility]

## 2020-10-17 NOTE — NURSING
Home Oxygen Evaluation    Date Performed: 10/17/2020    1) Patient's Home O2 Sat on room air, while at rest: 95%        If O2 sats on room air at rest are 88% or below, patient qualifies. No additional testing needed. Document N/A in steps 2 and 3. If 89% or above, complete steps 2.      2) Patient's O2 Sat on room air while exercisin%        If O2 sats on room air while exercising remain 89% or above patient does not qualify, no further testing needed Document N/A in step 3. If O2 sats on room air while exercising are 88% or below, continue to step 3.      3) Patient's O2 Sat while exercising on O2: n/a         (Must show improvement from #2 for patients to qualify)    If O2 sats improve on oxygen, patient qualifies for portable oxygen. If not, the patient does not qualify.

## 2020-10-17 NOTE — PLAN OF CARE
Inpatient consult to Neurology  Consult performed by: Andrez Frausto MD  Consult ordered by: Vilma Mattson MD            30 year old female with severe MR, CP, seizures on Keppra, Spina Bifida, S/p PEG who presented to the ER from her Group Home ([facility]) with hypoxemic respiratory failure and low O2 sats. CXR showed dense infiltrates in the RML. Was started on antibiotics for bacterial PNA.  pneumonia.   COVID+. Started on Dexamethasone.    Patient noted to have rhythmic eye rolling from side to side lasting about 5 mins concerning for seizures. Valium administered after which she had no further episodes. Per chart review, patient's last seizure was 2 months ago, where she 'tenses up and her lips turn blue'.    Neurology consulted for seizures and AED recs.    At baseline, patient does not speak or follow commands.    Extended EEG showed asymmetry with prominent right sided slowing, suggestive of a structural dysfunction, along with moderate to severe generalized slowing. No focal findings or electrographic seizures noted.    Currently patient is on Keppra 20mg/kg BID.    Plan:  -- Continue Keppra at current dose. Her seizure was likely a breakthrough 2/2 COVID pneumonia.  -- Level pending.  -- EEG showed no seizures but did show asymmetry with right sided slowing suggestive of a structural lesion.   -- Will discuss with staff tomorrow about CT/MRI.  -- Seizure precautions.  -- Ativan PRN for events lasting > 5mins.            Due to COVID-19, the Neurology team is limiting interaction with consult patients unless absolutely necessary in order to limit exposure to the virus. As of now, the Neurology team will be chart checking this patient and discussing with staff. If the patient has an acute neurological change and/or you believe the patient needs to be examined by a provider, please page Neurology on call to discuss the patient with a team member.     Discussed with staff   Chanell.      Neurology will continue to follow. Please call with any questions  Neurology Spectra [phone]        Andrez Frausto MD  PGY 4, Neurology  Pager No [phone]

## 2020-10-18 PROBLEM — K94.23 PEG TUBE MALFUNCTION: Status: ACTIVE | Noted: 2020-10-18

## 2020-10-18 LAB
ALBUMIN SERPL BCP-MCNC: 2.7 G/DL (ref 3.5–5.2)
ALP SERPL-CCNC: 213 U/L (ref 55–135)
ALT SERPL W/O P-5'-P-CCNC: 60 U/L (ref 10–44)
ANION GAP SERPL CALC-SCNC: 12 MMOL/L (ref 8–16)
AST SERPL-CCNC: 30 U/L (ref 10–40)
BACTERIA BLD CULT: NORMAL
BACTERIA BLD CULT: NORMAL
BASOPHILS # BLD AUTO: 0.02 K/UL (ref 0–0.2)
BASOPHILS NFR BLD: 0.4 % (ref 0–1.9)
BILIRUB SERPL-MCNC: 0.3 MG/DL (ref 0.1–1)
BUN SERPL-MCNC: 19 MG/DL (ref 6–20)
CALCIUM SERPL-MCNC: 9.9 MG/DL (ref 8.7–10.5)
CHLORIDE SERPL-SCNC: 107 MMOL/L (ref 95–110)
CO2 SERPL-SCNC: 27 MMOL/L (ref 23–29)
CREAT SERPL-MCNC: 0.6 MG/DL (ref 0.5–1.4)
CRP SERPL-MCNC: 23.8 MG/L (ref 0–8.2)
D DIMER PPP IA.FEU-MCNC: 2.3 MG/L FEU
DIFFERENTIAL METHOD: ABNORMAL
EOSINOPHIL # BLD AUTO: 0 K/UL (ref 0–0.5)
EOSINOPHIL NFR BLD: 0.8 % (ref 0–8)
ERYTHROCYTE [DISTWIDTH] IN BLOOD BY AUTOMATED COUNT: 13.1 % (ref 11.5–14.5)
EST. GFR  (AFRICAN AMERICAN): >60 ML/MIN/1.73 M^2
EST. GFR  (NON AFRICAN AMERICAN): >60 ML/MIN/1.73 M^2
FERRITIN SERPL-MCNC: 362 NG/ML (ref 20–300)
GLUCOSE SERPL-MCNC: 82 MG/DL (ref 70–110)
HCT VFR BLD AUTO: 36.1 % (ref 37–48.5)
HGB BLD-MCNC: 11.4 G/DL (ref 12–16)
IMM GRANULOCYTES # BLD AUTO: 0.15 K/UL (ref 0–0.04)
IMM GRANULOCYTES NFR BLD AUTO: 3.1 % (ref 0–0.5)
LDH SERPL L TO P-CCNC: 444 U/L (ref 110–260)
LYMPHOCYTES # BLD AUTO: 1.7 K/UL (ref 1–4.8)
LYMPHOCYTES NFR BLD: 35.1 % (ref 18–48)
MCH RBC QN AUTO: 29.2 PG (ref 27–31)
MCHC RBC AUTO-ENTMCNC: 31.6 G/DL (ref 32–36)
MCV RBC AUTO: 92 FL (ref 82–98)
MONOCYTES # BLD AUTO: 0.4 K/UL (ref 0.3–1)
MONOCYTES NFR BLD: 8.6 % (ref 4–15)
NEUTROPHILS # BLD AUTO: 2.5 K/UL (ref 1.8–7.7)
NEUTROPHILS NFR BLD: 52 % (ref 38–73)
NRBC BLD-RTO: 1 /100 WBC
PLATELET # BLD AUTO: 248 K/UL (ref 150–350)
PMV BLD AUTO: 11.4 FL (ref 9.2–12.9)
POTASSIUM SERPL-SCNC: 4 MMOL/L (ref 3.5–5.1)
PROT SERPL-MCNC: 7.8 G/DL (ref 6–8.4)
RBC # BLD AUTO: 3.91 M/UL (ref 4–5.4)
SODIUM SERPL-SCNC: 146 MMOL/L (ref 136–145)
WBC # BLD AUTO: 4.78 K/UL (ref 3.9–12.7)

## 2020-10-18 PROCEDURE — 25000003 PHARM REV CODE 250: Performed by: INTERNAL MEDICINE

## 2020-10-18 PROCEDURE — 99232 SBSQ HOSP IP/OBS MODERATE 35: CPT | Mod: ,,, | Performed by: HOSPITALIST

## 2020-10-18 PROCEDURE — 63600175 PHARM REV CODE 636 W HCPCS: Performed by: FAMILY MEDICINE

## 2020-10-18 PROCEDURE — 85025 COMPLETE CBC W/AUTO DIFF WBC: CPT

## 2020-10-18 PROCEDURE — 20600001 HC STEP DOWN PRIVATE ROOM

## 2020-10-18 PROCEDURE — 25500020 PHARM REV CODE 255: Performed by: STUDENT IN AN ORGANIZED HEALTH CARE EDUCATION/TRAINING PROGRAM

## 2020-10-18 PROCEDURE — 25000003 PHARM REV CODE 250: Performed by: FAMILY MEDICINE

## 2020-10-18 PROCEDURE — U0003 INFECTIOUS AGENT DETECTION BY NUCLEIC ACID (DNA OR RNA); SEVERE ACUTE RESPIRATORY SYNDROME CORONAVIRUS 2 (SARS-COV-2) (CORONAVIRUS DISEASE [COVID-19]), AMPLIFIED PROBE TECHNIQUE, MAKING USE OF HIGH THROUGHPUT TECHNOLOGIES AS DESCRIBED BY CMS-2020-01-R: HCPCS

## 2020-10-18 PROCEDURE — 63600175 PHARM REV CODE 636 W HCPCS

## 2020-10-18 PROCEDURE — 85379 FIBRIN DEGRADATION QUANT: CPT

## 2020-10-18 PROCEDURE — 86140 C-REACTIVE PROTEIN: CPT

## 2020-10-18 PROCEDURE — 36415 COLL VENOUS BLD VENIPUNCTURE: CPT

## 2020-10-18 PROCEDURE — 94761 N-INVAS EAR/PLS OXIMETRY MLT: CPT

## 2020-10-18 PROCEDURE — 82728 ASSAY OF FERRITIN: CPT

## 2020-10-18 PROCEDURE — 99232 PR SUBSEQUENT HOSPITAL CARE,LEVL II: ICD-10-PCS | Mod: ,,, | Performed by: HOSPITALIST

## 2020-10-18 PROCEDURE — 83615 LACTATE (LD) (LDH) ENZYME: CPT

## 2020-10-18 PROCEDURE — 80053 COMPREHEN METABOLIC PANEL: CPT

## 2020-10-18 RX ORDER — LIDOCAINE HYDROCHLORIDE 10 MG/ML
10 INJECTION, SOLUTION EPIDURAL; INFILTRATION; INTRACAUDAL; PERINEURAL ONCE
Status: DISCONTINUED | OUTPATIENT
Start: 2020-10-18 | End: 2020-10-23 | Stop reason: HOSPADM

## 2020-10-18 RX ORDER — ONDANSETRON 2 MG/ML
INJECTION INTRAMUSCULAR; INTRAVENOUS
Status: COMPLETED
Start: 2020-10-18 | End: 2020-10-18

## 2020-10-18 RX ORDER — ONDANSETRON 2 MG/ML
4 INJECTION INTRAMUSCULAR; INTRAVENOUS EVERY 6 HOURS PRN
Status: DISCONTINUED | OUTPATIENT
Start: 2020-10-18 | End: 2020-10-23 | Stop reason: HOSPADM

## 2020-10-18 RX ADMIN — ONDANSETRON 4 MG: 2 INJECTION INTRAMUSCULAR; INTRAVENOUS at 12:10

## 2020-10-18 RX ADMIN — BACLOFEN 10 MG: 10 TABLET ORAL at 10:10

## 2020-10-18 RX ADMIN — DEXAMETHASONE SODIUM PHOSPHATE 6 MG: 4 INJECTION INTRA-ARTICULAR; INTRALESIONAL; INTRAMUSCULAR; INTRAVENOUS; SOFT TISSUE at 08:10

## 2020-10-18 RX ADMIN — ENOXAPARIN SODIUM 40 MG: 40 INJECTION SUBCUTANEOUS at 09:10

## 2020-10-18 RX ADMIN — LEVETIRACETAM 1088 MG: 100 SOLUTION ORAL at 08:10

## 2020-10-18 RX ADMIN — MELATONIN TAB 3 MG 3 MG: 3 TAB at 09:10

## 2020-10-18 RX ADMIN — BACLOFEN 10 MG: 10 TABLET ORAL at 08:10

## 2020-10-18 RX ADMIN — THERA TABS 1 TABLET: TAB at 08:10

## 2020-10-18 RX ADMIN — IOHEXOL 100 ML: 350 INJECTION, SOLUTION INTRAVENOUS at 04:10

## 2020-10-18 RX ADMIN — LEVETIRACETAM 1088 MG: 100 SOLUTION ORAL at 09:10

## 2020-10-18 RX ADMIN — BACLOFEN 10 MG: 10 TABLET ORAL at 05:10

## 2020-10-18 NOTE — NURSING
Pt started dry heaving again after turning, Dr. Mattson on floor. MD ordered PRN zofran.   Mouth suctioned. Pt opened mouth wider, PEG bumper noted in pt's mouth. Charge RN, Katiuska happened to be passing by. Called Katiuska in room for assistance. Able to quickly remove bumper out of mouth. Mouth suctioned.   Messaged Dr. Mattson to notify    [phone] Dr. Mattson aware

## 2020-10-18 NOTE — SUBJECTIVE & OBJECTIVE
No current facility-administered medications on file prior to encounter.      Current Outpatient Medications on File Prior to Encounter   Medication Sig    baclofen (LIORESAL) 10 MG tablet Take 10 mg by mouth 3 (three) times daily.    calcium-vitamin D (CALCIUM-VITAMIN D) 500 mg(1,250mg) -200 unit per tablet Take 1 tablet by mouth 2 (two) times daily with meals.      diazepam 20 mg Kit Place rectally. Use prn for seizures     fluticasone (FLONASE) 50 mcg/actuation nasal spray 1 spray by Each Nare route once daily.    hydroxypropyl methylcellulose (ISOPTO TEARS) 2.5 % ophthalmic solution 1 drop as needed.      lactulose (CHRONULAC) 10 gram/15 mL solution Take by mouth 3 (three) times daily.      levetiracetam (KEPPRA) 100 mg/mL Soln Take 20 mg/kg by mouth 2 (two) times daily.      loratadine (CLARITIN) 10 mg tablet Take 10 mg by mouth once daily.    melatonin 3 mg Tab Take by mouth nightly as needed.      montelukast (SINGULAIR) 10 mg tablet Take 10 mg by mouth every evening.    mv-min-FA-Rw-Ib-rwojxhr-lutein (MULTIVITAL) 0.4-162-18 mg Tab Take by mouth.      pantoprazole (PROTONIX) 40 MG tablet Take 40 mg by mouth once daily.      trypsin-balsam-castor oil (VASOLEX) 76- unit-mg-mg/gram Oint Apply topically once daily. Apply daily to periwound as directed       Review of patient's allergies indicates:  No Known Allergies    Past Medical History:   Diagnosis Date    ADHD (attention deficit hyperactivity disorder)     Blood transfusion     Cerebral palsy     Congenital hip dislocation     Encephalopathy     Infantile myoclonic epilepsy     Iron deficiency anemia     Mental retardation     Pneumonia, aspiration     Seizures     Spina bifida aperta      Past Surgical History:   Procedure Laterality Date    ABDOMINAL SURGERY      BRAIN SURGERY      GASTROSTOMY      PEG tube    HIP SURGERY      JOINT REPLACEMENT       Family History     None        Tobacco Use    Smoking status: Never  Smoker    Smokeless tobacco: Never Used   Substance and Sexual Activity    Alcohol use: No    Drug use: No    Sexual activity: Never     Review of Systems   Unable to perform ROS: Patient nonverbal     Objective:     Vital Signs (Most Recent):  Temp: 96.8 °F (36 °C) (10/18/20 1653)  Pulse: 65 (10/18/20 1117)  Resp: 18 (10/18/20 1653)  BP: 100/60 (10/18/20 1653)  SpO2: (!) 93 % (10/18/20 0849) Vital Signs (24h Range):  Temp:  [96.8 °F (36 °C)-98.6 °F (37 °C)] 96.8 °F (36 °C)  Pulse:  [64-87] 65  Resp:  [18-20] 18  SpO2:  [91 %-94 %] 93 %  BP: ()/(54-76) 100/60     Weight: 53.6 kg (118 lb 2.7 oz)  Body mass index is 22.33 kg/m².    Physical Exam  Vitals signs and nursing note reviewed.   Constitutional:       General: She is not in acute distress.     Appearance: She is well-developed. She is not ill-appearing, toxic-appearing or diaphoretic.   HENT:      Head: Atraumatic.   Eyes:      Pupils: Pupils are equal, round, and reactive to light.   Neck:      Musculoskeletal: Normal range of motion and neck supple.   Cardiovascular:      Rate and Rhythm: Normal rate and regular rhythm.   Pulmonary:      Effort: Pulmonary effort is normal. No respiratory distress.      Comments: On RA  Abdominal:      General: There is no distension.      Palpations: Abdomen is soft.      Tenderness: There is no abdominal tenderness. There is no guarding.      Comments: [place] in place in LUQ   Musculoskeletal: Normal range of motion.   Skin:     General: Skin is warm and dry.   Neurological:      Mental Status: She is alert.         Significant Labs:  CBC:   Recent Labs   Lab 10/18/20  0146   WBC 4.78   RBC 3.91*   HGB 11.4*   HCT 36.1*      MCV 92   MCH 29.2   MCHC 31.6*     CMP:   Recent Labs   Lab 10/18/20  0145   GLU 82   CALCIUM 9.9   ALBUMIN 2.7*   PROT 7.8   *   K 4.0   CO2 27      BUN 19   CREATININE 0.6   ALKPHOS 213*   ALT 60*   AST 30   BILITOT 0.3       Significant Diagnostics:  I have reviewed all  pertinent imaging results/findings within the past 24 hours.

## 2020-10-18 NOTE — PLAN OF CARE
EOS: No acute changes. Patient restless up all night hitting the air, which as reported is her baseline. VSS. Tube feeding at 40ml/hr. Q6 flushes 180 ml water in peg tube. Q 4 residual check, no residuals. HOB 30-45. Turn q 2. Soft sara restraints, q 2 restraint safety checks. Wctm    Problem: Fall Injury Risk  Goal: Absence of Fall and Fall-Related Injury  Outcome: Ongoing, Progressing     Problem: Restraint, Nonbehavioral (Nonviolent)  Goal: Discontinuation Criteria Achieved  Outcome: Ongoing, Progressing  Goal: Personal Dignity and Safety Maintained  Outcome: Ongoing, Progressing     Problem: Adult Inpatient Plan of Care  Goal: Plan of Care Review  Outcome: Ongoing, Progressing  Goal: Patient-Specific Goal (Individualization)  Outcome: Ongoing, Progressing  Goal: Absence of Hospital-Acquired Illness or Injury  Outcome: Ongoing, Progressing  Goal: Optimal Comfort and Wellbeing  Outcome: Ongoing, Progressing  Goal: Readiness for Transition of Care  Outcome: Ongoing, Progressing  Goal: Rounds/Family Conference  Outcome: Ongoing, Progressing     Problem: Wound  Goal: Optimal Wound Healing  Outcome: Ongoing, Progressing     Problem: Skin Injury Risk Increased  Goal: Skin Health and Integrity  Outcome: Ongoing, Progressing

## 2020-10-18 NOTE — NURSING
Call placed to pt's father, henrique, to give update. Voicemail left asking Henrique to call when able. Phone number provided

## 2020-10-18 NOTE — CONSULTS
[facility] - ICU 15 WT  General Surgery  Consult Note    Patient Name: Alexander Sainz  MRN: [id number]  Code Status: Full Code  Admission Date: 10/11/2020  Hospital Length of Stay: 7 days  Attending Physician: Vilma Mattson MD  Primary Care Provider: Cristóbal Krishnan MD    Patient information was obtained from past medical records and ER records.     Inpatient consult to General Surgery  Consult performed by: Lukasz Junior MD  Consult ordered by: Vilma Mattson MD  Reason for consult: PEG removed        Subjective:     Principal Problem: Pneumonia due to COVID-19 virus    History of Present Illness: Patient is 29 yo F with history of CP and PEG tube placed for dysphagia in 2008 who presented to ED with COVID who this morning was noted to have gotten free of her restraints and accidentally removed her PEG tube. A coleman was placed into the tract by nursing and general surgery was consulted to yaquelin.    No current facility-administered medications on file prior to encounter.      Current Outpatient Medications on File Prior to Encounter   Medication Sig    baclofen (LIORESAL) 10 MG tablet Take 10 mg by mouth 3 (three) times daily.    calcium-vitamin D (CALCIUM-VITAMIN D) 500 mg(1,250mg) -200 unit per tablet Take 1 tablet by mouth 2 (two) times daily with meals.      diazepam 20 mg Kit Place rectally. Use prn for seizures     fluticasone (FLONASE) 50 mcg/actuation nasal spray 1 spray by Each Nare route once daily.    hydroxypropyl methylcellulose (ISOPTO TEARS) 2.5 % ophthalmic solution 1 drop as needed.      lactulose (CHRONULAC) 10 gram/15 mL solution Take by mouth 3 (three) times daily.      levetiracetam (KEPPRA) 100 mg/mL Soln Take 20 mg/kg by mouth 2 (two) times daily.      loratadine (CLARITIN) 10 mg tablet Take 10 mg by mouth once daily.    melatonin 3 mg Tab Take by mouth nightly as needed.      montelukast (SINGULAIR) 10 mg tablet Take 10 mg by mouth every evening.     mv-min-FA-Bi-Ot-gsfnnny-lutein (MULTIVITAL) 0.4-162-18 mg Tab Take by mouth.      pantoprazole (PROTONIX) 40 MG tablet Take 40 mg by mouth once daily.      trypsin-balsam-castor oil (VASOLEX) 90- unit-mg-mg/gram Oint Apply topically once daily. Apply daily to periwound as directed       Review of patient's allergies indicates:  No Known Allergies    Past Medical History:   Diagnosis Date    ADHD (attention deficit hyperactivity disorder)     Blood transfusion     Cerebral palsy     Congenital hip dislocation     Encephalopathy     Infantile myoclonic epilepsy     Iron deficiency anemia     Mental retardation     Pneumonia, aspiration     Seizures     Spina bifida aperta      Past Surgical History:   Procedure Laterality Date    ABDOMINAL SURGERY      BRAIN SURGERY      GASTROSTOMY      PEG tube    HIP SURGERY      JOINT REPLACEMENT       Family History     None        Tobacco Use    Smoking status: Never Smoker    Smokeless tobacco: Never Used   Substance and Sexual Activity    Alcohol use: No    Drug use: No    Sexual activity: Never     Review of Systems   Unable to perform ROS: Patient nonverbal     Objective:     Vital Signs (Most Recent):  Temp: 96.8 °F (36 °C) (10/18/20 1653)  Pulse: 65 (10/18/20 1117)  Resp: 18 (10/18/20 1653)  BP: 100/60 (10/18/20 1653)  SpO2: (!) 93 % (10/18/20 0849) Vital Signs (24h Range):  Temp:  [96.8 °F (36 °C)-98.6 °F (37 °C)] 96.8 °F (36 °C)  Pulse:  [64-87] 65  Resp:  [18-20] 18  SpO2:  [91 %-94 %] 93 %  BP: ()/(54-76) 100/60     Weight: 53.6 kg (118 lb 2.7 oz)  Body mass index is 22.33 kg/m².    Physical Exam  Vitals signs and nursing note reviewed.   Constitutional:       General: She is not in acute distress.     Appearance: She is well-developed. She is not ill-appearing, toxic-appearing or diaphoretic.   HENT:      Head: Atraumatic.   Eyes:      Pupils: Pupils are equal, round, and reactive to light.   Neck:      Musculoskeletal: Normal range  of motion and neck supple.   Cardiovascular:      Rate and Rhythm: Normal rate and regular rhythm.   Pulmonary:      Effort: Pulmonary effort is normal. No respiratory distress.      Comments: On RA  Abdominal:      General: There is no distension.      Palpations: Abdomen is soft.      Tenderness: There is no abdominal tenderness. There is no guarding.      Comments: Coleman in place in LUQ   Musculoskeletal: Normal range of motion.   Skin:     General: Skin is warm and dry.   Neurological:      Mental Status: She is alert.         Significant Labs:  CBC:   Recent Labs   Lab 10/18/20  0146   WBC 4.78   RBC 3.91*   HGB 11.4*   HCT 36.1*      MCV 92   MCH 29.2   MCHC 31.6*     CMP:   Recent Labs   Lab 10/18/20  0145   GLU 82   CALCIUM 9.9   ALBUMIN 2.7*   PROT 7.8   *   K 4.0   CO2 27      BUN 19   CREATININE 0.6   ALKPHOS 213*   ALT 60*   AST 30   BILITOT 0.3       Significant Diagnostics:  I have reviewed all pertinent imaging results/findings within the past 24 hours.    Assessment/Plan:     PEG tube malfunction  Patient is 29 yo F with CP who recently pulled her PEG and had a coleman replaced    Coleman was aspirated with low volume of yellow fluid, likely gastric  Flushed easily  Was sutured in place to decrease risk of removing again  Tube study was ordered and KUB confirmed intra-gastric positioning of catheter    Ok to use coleman for TF, medicine, venting etc  Ok to discharge with coleman as PEG tube  Recommend abd binder to make harder to reach tube    No further interventions indicated    Please call with any questions or concerned      VTE Risk Mitigation (From admission, onward)         Ordered     enoxaparin injection 40 mg  Every 24 hours      10/11/20 1334     IP VTE HIGH RISK PATIENT  Once      10/11/20 1334     Place sequential compression device  Until discontinued      10/11/20 1334                Thank you for your consult. I will sign off. Please contact us if you have any additional  questions.    Lukasz Junior MD  General Surgery  [facility] 15 WT

## 2020-10-18 NOTE — PROGRESS NOTES
[facility]  Progress Note  [facility]      Patient Name: Alexander Sainz  MRN:  [id number]  Hospital Medicine Team: [facility] Vilma Mattson MD  Date of Admission:  10/11/2020     Length of Stay:  LOS: 7 days       Principal Problem:  Pneumonia due to COVID-19 virus      HPI:  30yoF with severe MR, cerebral palsy, spina bifida, chronic transaminitis, chronic hypernatremia, lives at [facility], who presented to  ED with fever 102 at home, and hypoxemic resp failure with sats in the field 88%.  In ED, temp 99, Tachy 119, O2 sats 82% on RA and improved to 90s on 2-3L.  CXR with fairly dense infiltrate in RML - appears bacterial in nature.  Was Tx-ed for bacterial PNA with Rocephin and Azithro. Covid +  and called for transfer to [facility].  One dose of IV Dexa.     Interval History:     Patient pulled PEG out this AM. Surgery consulted for reinsertion. RN found that patient had PEG bumper in her mouth; was able to remove. Patient appears comfortable at this time.     Review of Systems:  Unable to obtain, patient non verbal    Inpatient Medications:    Current Facility-Administered Medications:     baclofen tablet 10 mg, 10 mg, Per G Tube, TID, Augie Calvo MD, 10 mg at 10/18/20 0848    dexamethasone injection 6 mg, 6 mg, Intravenous, Q24H, Augie Calvo MD, 6 mg at 10/18/20 0848    dextrose 50% injection 12.5 g, 12.5 g, Intravenous, PRN, Augie Calvo MD    dextrose 50% injection 25 g, 25 g, Intravenous, PRN, Augie Calvo MD    diazePAM 5-7.5-10 mg (DIASTAT ACUDIAL) rectal kit 20 mg, 20 mg, Rectal, PRN, Augie Calvo MD, 10 mg at 10/17/20 0110    enoxaparin injection 40 mg, 40 mg, Subcutaneous, Q24H, Augie Calvo MD, 40 mg at 10/17/20 2227    glucagon (human recombinant) injection 1 mg, 1 mg, Intramuscular, PRN, Augie Calvo MD    lactulose 10 gram/15 mL solution (enema) 200 g, 200 g, Rectal, BID PRN, Augie Calvo MD    levetiracetam oral soln  "Soln 1,088 mg, 20 mg/kg, Per G Tube, BID, Augie Calvo MD, 1,088 mg at 10/18/20 0848    lidocaine (PF) 10 mg/ml (1%) injection 100 mg, 10 mL, Other, Once, Lukasz Junior MD    melatonin tablet 3 mg, 3 mg, Per G Tube, Nightly PRN, Augie Calvo MD, 3 mg at 10/17/20 2227    multivitamin tablet, 1 tablet, Oral, Daily, Lokesh Mishra MD, 1 tablet at 10/18/20 0848    ondansetron injection 4 mg, 4 mg, Intravenous, Q6H PRN, Vilma Mattson MD, 4 mg at 10/18/20 1213    sodium chloride 0.9% flush 10 mL, 10 mL, Intravenous, PRN, Augie Calvo MD      Physical Exam:      Intake/Output Summary (Last 24 hours) at 10/18/2020 1313  Last data filed at 10/18/2020 0900  Gross per 24 hour   Intake 1685 ml   Output 1000 ml   Net 685 ml     Wt Readings from Last 3 Encounters:   10/16/20 53.6 kg (118 lb 2.7 oz)   10/11/20 54.4 kg (119 lb 14.9 oz)   05/28/19 54.4 kg (120 lb)       BP 99/73   Pulse 65   Temp 97 °F (36.1 °C) (Axillary)   Resp 18   Ht 5' 1" (1.549 m)   Wt 53.6 kg (118 lb 2.7 oz)   LMP  (LMP Unknown)   SpO2 (!) 93%   Breastfeeding No   BMI 22.33 kg/m²     GEN: NAD, opens eyes to sternal rub  Resp: coarse bilateral breath sounds, no wheezes or rales, normal work of breathing   CV: RRR, no m/r/g, no edema  GI: soft, NTND  Skin: no rash  Neuro: PERRL    Laboratory:  Lab Results   Component Value Date    [id number] Positive (A) 10/11/2020       Recent Labs   Lab 10/15/20  0516 10/16/20  0246 10/18/20  0146   WBC 4.54 3.86* 4.78   LYMPH 35.9  1.6 29.8  1.2 35.1  1.7   HGB 9.4* 9.6* 11.4*   HCT 29.4* 29.9* 36.1*    177 248     Recent Labs   Lab 10/14/20  0452 10/15/20  0516 10/16/20  0246 10/18/20  0145   * 147* 146* 146*   K 3.9 3.9 3.7 4.0   * 110 109 107   CO2 27 29 26 27   BUN 18 15 15 19   CREATININE 0.6 0.5 0.5 0.6   * 72 121* 82   CALCIUM 8.9 8.7 9.2 9.9   MG 2.0 1.7 1.8  --    PHOS 3.6 3.6 3.3  --      Recent Labs   Lab 10/15/20  0516 10/16/20  0246 10/18/20  0145 "   ALKPHOS 210* 200* 213*   ALT 71* 66* 60*   AST 43* 35 30   ALBUMIN 2.2* 2.3* 2.7*   PROT 6.4 6.7 7.8   BILITOT 0.2 0.2 0.3        Recent Labs     10/16/20  0246 10/18/20  0145   DDIMER 1.22* 2.30*   FERRITIN 432* 362*   CRP 58.2* 23.8*   * 444*       All labs within the last 24 hours were reviewed.     Microbiology:  Microbiology Results (last 7 days)     Procedure Component Value Units Date/Time    Blood culture [[id number]] Collected: 10/13/20 1103    Order Status: Completed Specimen: Blood from Peripheral, Site Unknown Updated: 10/17/20 1412     Blood Culture, Routine No Growth to date      No Growth to date      No Growth to date      No Growth to date      No Growth to date    Blood culture [[id number]] Collected: 10/13/20 1115    Order Status: Completed Specimen: Blood Updated: 10/17/20 1412     Blood Culture, Routine No Growth to date      No Growth to date      No Growth to date      No Growth to date      No Growth to date    Blood culture [[id number]] Collected: 10/12/20 0958    Order Status: Completed Specimen: Blood from Peripheral, Right Hand Updated: 10/17/20 1412     Blood Culture, Routine No growth after 5 days.    Blood culture [[id number]] Collected: 10/12/20 0952    Order Status: Completed Specimen: Blood from Peripheral, Right Hand Updated: 10/17/20 1412     Blood Culture, Routine No growth after 5 days.    Blood culture x two cultures. Draw prior to antibiotics. [[id number]] Collected: 10/11/20 0815    Order Status: Completed Specimen: Blood from Peripheral, Antecubital, Right Updated: 10/16/20 0903     Blood Culture, Routine No growth after 5 days.    Narrative:      Aerobic and anaerobic    Blood culture x two cultures. Draw prior to antibiotics. [[id number]]  (Abnormal) Collected: 10/11/20 0735    Order Status: Completed Specimen: Blood from Peripheral, Hand, Left Updated: 10/15/20 0746     Blood Culture, Routine Gram stain aer bottle: Gram positive cocci in clusters resembling Staph        Results called to and read back by: Betzy Wolfe 10/12/2020  08:51      COAGULASE-NEGATIVE STAPHYLOCOCCUS SPECIES  Organism is a probable contaminant      Narrative:      Aerobic and anaerobic            Imaging  ECG Results          EKG 12-lead (Final result)  Result time 10/13/20 19:55:01    Final result by Interface, Lab In [facility] (10/13/20 19:55:01)                 Narrative:    Test Reason : R00.0,    Vent. Rate : 120 BPM     Atrial Rate : 120 BPM     P-R Int : 146 ms          QRS Dur : 086 ms      QT Int : 316 ms       P-R-T Axes : 045 076 -03 degrees     QTc Int : 446 ms    Sinus tachycardia  Possible Left atrial enlargement  Nonspecific T wave abnormality  Abnormal ECG  When compared with ECG of 07-JUN-2015 08:49,  Significant changes have occurred  Confirmed by Magdy Gomez MD ([id number]) on 10/13/2020 7:54:44 PM    Referred By: DILIAERR   SELF           Confirmed By:Magdy Gomez MD                              No results found for this or any previous visit.    X-Ray Chest AP Portable  Narrative: EXAMINATION:  XR CHEST AP PORTABLE    CLINICAL HISTORY:  hypothermia; COVID-19    TECHNIQUE:  Single frontal view of the chest was performed.    COMPARISON:  10/11/2020, 07:38 hours.  06/09/2015.    FINDINGS:  Patchy heterogeneous opacities are present in both lungs, more conspicuous on the right.  These have shown radiographic progression since 10/11/2020.  Radiographic appearance is consistent with COVID-19 lung disease.    I detect no pleural fluid, pneumothorax, pneumomediastinum or pneumoperitoneum.  Mediastinal structures are midline.    I detect no significant osseous abnormality.  Impression: Abnormal chest radiograph.    Electronically signed by: Alyce Hernandez MD  Date:    10/13/2020  Time:    12:25      All imaging within the last 24 hours was reviewed.     Assessment and Plan:    Active Hospital Problems    Diagnosis  POA    *Pneumonia due to COVID-19 virus [U07.1, J12.89]  Yes    Sinus  tachycardia [R00.0]  Yes    Hypoxia [R09.02]  Yes    Hypernatremia [E87.0]  Yes    Abnormal liver enzymes [R74.8]  Yes    Cerebral palsy [G80.9]  Yes      Resolved Hospital Problems   No resolved problems to display.         COVID-19 Virus Infection  Viral Pneumonia due to COVID-19    -Completed CAP coverage w/ abx  -On remdesivir/dexamethasone  - COVID-19 testing:   - Isolation: Airborne/Droplet. Surgical mask on patient. Notify Infection Control  - Diagnostics: Trend Q48hrs if stable, more frequently if patient decompensating     Laboratory/Study Frequency Result   CBC Admit & Q48h    CMP Admit & Q48h LFTs elevated   Magnesium level Admit    D-dimer Admit & Q48h 2.1 > 2.16 > 1.2 > 2.3   Ferritin Admit & Q48h 1000 > 1214 > 432 > 362   CRP Admit & Q48h 182.8 > 46.3 > 58 > 23   CPK Admit & Q24h if elevated 95   LDH Admit & Q48h 579 > 426 > 444   Vitamin D Admit 54   BNP Admit    Troponin Admit & Q6h if elevated    Glucose-6-phos dehydrogenase Admit    Procalcitonin Admit .62   Lipid Panel If using statin    Rapid influenza Admit -   Resp Infection Panel Admit if BMT/organ transplant    Legionella Antigen Admit ordered   Sputum Culture Admit ordered   Blood Culture Admit 1/2+   Urinalysis & Culture Admit    ECG Admit & Q48h if on HCQ    CXR Admit    Lymphopenia, hyponatremia, hyperferritinemia, elevated troponin, elevated d-dimer, age, and medical comorbidities are significant predictors of poor clinical outcome    - [facility])    - Monitoring:   - Telemetry & Continuous Pulse Oximetry    - Nutrition:    - Multivitamin PO daily   - Add Boost supplement   - Vitamin D 1000IU daily if deficient   - Ascorbic acid 500mg PO bid    - Supportive Care:   - acetaminophen 650mg PO Q6hr PRN fever/headache   - loperamide PRN viral diarrhea   - IVF if indicated, restrictive strategy preferred, no maintenance IV if able   - VTE PPx: enoxaparin or heparin SQ unless contraindicated     - Antibiotics:  - if indications, CXR findings, elevated procal. See protocol for alternatives.   - Discontinue early if low concern for bacterial co-infection   - ceftriaxone 1g Q24h x 5 days  - azithromycin 500mg po x1, then 250mg po daily x 4 days    - Investigational Therapies:   - If patient meets criteria    Acute Hypoxemic Respiratory Failure  - Order RT consult via Respiratory Communication for COVID Protocols  - Order Incentive Spirometer Q4h  - Order Flutter Valve Q4h  - Continuous Pulse Oximetry  - Goal SpO2 92-96%  - Supplemental O2 via LFNC, VentiMask, or HFNC (see Respiratory Support Oxygen Therapies)  - If wheezing, albuterol INH Q6h scheduled & PRN  - Proning Protocol if patient is a candidate (see HM Proning Protocol)   - GCS >13, able to self-prone  - If deterioration, may warrant trial of NIPPV and transfer to [facility] pressure room or immediate ICU consult  - Now on room air     PEG Tube Malfunction  -pt pulled out PEG 10/18. Discussed w/ Surgery for reinsertion.     HyperNA  Unclear if patient had been getting free water pushes through PEG tube.   -continue 175ml  QID  -improving  -monitor     CP   Complications  Spastic/sz/developmentally delayed  Nonverbal, spastic  Continue baclofen      Seizure  Had rhythmic eye rolling c/f seizure. Resolved after Valium given.   Discuss w/ Neurology.   Seizure precautions. Continue home keppra    Diet: Isosource 1.5 at 40ml/hr for 733ml  Code; Full  Ppx: lovenox  Dispo: back to [facility] if we can wean o2, if not, may need alternative arrangement since does not have ability to support o2 need      VTE High Risk Prophylaxis:   VTE Risk Mitigation (From admission, onward)         Ordered     enoxaparin injection 40 mg  Every 24 hours      10/11/20 1334     IP VTE HIGH RISK PATIENT  Once      10/11/20 1334     Place sequential compression device  Until discontinued      10/11/20 1334              Pt has the following problems which are a threat to life and  bodily function, and are unstable: acute resp failure, COVID viral PNA    Vilma Mattson MD  Hospital Medicine Staff  [facility]

## 2020-10-18 NOTE — NURSING
0930 Night RN reported pt had a good night, pt has been calm this morning, smiled occasionally, appears to be feeling well. Has been on RA since yesterday morning, maintaining sats above 93%. Pt appears comfortable and is not reaching for medical devices, will attempt trial period for restraint removal. PEG covered and secured with tape, tubing covered. Abdominal binder in place. Telesitter monitoring patient. Nursing sitting nearby room. Mitts removed.    1020 Rounded on patient, pt has telemetry leads completely off, abdominal binder off, PEG pulled out. No bleeding or drainage noted at PEG site. Pt started dry heaving, mouth suctioned. Pt sat up 45 degrees. Bilat mitts replaced. Sats maintained on RA 93%, no coughing noted.    Was not notified by monitor room that Pt was off telemetry, was not notified by telesitter that pt was pulling at medical devices.      Charge RN, Katiuska informed. Dr. Mattson notified, will be consulting surgery to evaluate. Asked Dr. Mattson if nursing should place coleman cath to keep insertion site open, Dr. Mattson said yes. 12F colmean cath placed easily, no resistance.         1100 Pt asleep, but arousable. No more dry heaving noted

## 2020-10-18 NOTE — NURSING
"Pt's dad, Renard, updated about events this morning. Renard is asking not to attempt to remove the restraints while she is here since she is "very quick" and has pulled out the PEG several times before.   Renard states at [facility] they have to layer shirt/abdominal binder/shirt to keep Pt from pulling PEG.   "

## 2020-10-18 NOTE — HPI
Patient is 31 yo F with history of CP and PEG tube placed for dysphagia in 2008 who presented to ED with COVID who this morning was noted to have gotten free of her restraints and accidentally removed her PEG tube. A coleman was placed into the tract by nursing and general surgery was consulted to yaquelin.

## 2020-10-18 NOTE — ASSESSMENT & PLAN NOTE
Patient is 31 yo F with CP who recently pulled her PEG and had a coleman replaced    Coleman was aspirated with low volume of yellow fluid, likely gastric  Flushed easily  Was sutured in place to decrease risk of removing again  Tube study was ordered and KUB confirmed intra-gastric positioning of catheter    Ok to use coleman for TF, medicine, venting etc  Ok to discharge with coleman as PEG tube  Recommend abd binder to make harder to reach tube    No further interventions indicated    Please call with any questions or concerned

## 2020-10-18 NOTE — NURSING
PO contrast requested. Xray notified that we need to coordinate timing of xray and contrast. Will call xray when contrast available.    [id number] Medication still has not been received. Another request sent to pharmacy

## 2020-10-19 LAB
LEVETIRACETAM SERPL-MCNC: 26.1 UG/ML (ref 3–60)
POCT GLUCOSE: 108 MG/DL (ref 70–110)
SARS-COV-2 RNA RESP QL NAA+PROBE: DETECTED

## 2020-10-19 PROCEDURE — 99232 SBSQ HOSP IP/OBS MODERATE 35: CPT | Mod: ,,, | Performed by: HOSPITALIST

## 2020-10-19 PROCEDURE — 27000221 HC OXYGEN, UP TO 24 HOURS

## 2020-10-19 PROCEDURE — 20600001 HC STEP DOWN PRIVATE ROOM

## 2020-10-19 PROCEDURE — 25000003 PHARM REV CODE 250: Performed by: FAMILY MEDICINE

## 2020-10-19 PROCEDURE — 63600175 PHARM REV CODE 636 W HCPCS: Performed by: HOSPITALIST

## 2020-10-19 PROCEDURE — 99900035 HC TECH TIME PER 15 MIN (STAT)

## 2020-10-19 PROCEDURE — 63600175 PHARM REV CODE 636 W HCPCS: Performed by: FAMILY MEDICINE

## 2020-10-19 PROCEDURE — 94761 N-INVAS EAR/PLS OXIMETRY MLT: CPT

## 2020-10-19 PROCEDURE — 99232 PR SUBSEQUENT HOSPITAL CARE,LEVL II: ICD-10-PCS | Mod: ,,, | Performed by: HOSPITALIST

## 2020-10-19 PROCEDURE — 25000003 PHARM REV CODE 250: Performed by: INTERNAL MEDICINE

## 2020-10-19 RX ADMIN — MELATONIN TAB 3 MG 3 MG: 3 TAB at 09:10

## 2020-10-19 RX ADMIN — BACLOFEN 10 MG: 10 TABLET ORAL at 08:10

## 2020-10-19 RX ADMIN — LEVETIRACETAM 1088 MG: 100 SOLUTION ORAL at 08:10

## 2020-10-19 RX ADMIN — ENOXAPARIN SODIUM 40 MG: 40 INJECTION SUBCUTANEOUS at 09:10

## 2020-10-19 RX ADMIN — DEXAMETHASONE SODIUM PHOSPHATE 6 MG: 4 INJECTION INTRA-ARTICULAR; INTRALESIONAL; INTRAMUSCULAR; INTRAVENOUS; SOFT TISSUE at 08:10

## 2020-10-19 RX ADMIN — ONDANSETRON 4 MG: 2 INJECTION INTRAMUSCULAR; INTRAVENOUS at 04:10

## 2020-10-19 RX ADMIN — THERA TABS 1 TABLET: TAB at 08:10

## 2020-10-19 RX ADMIN — LEVETIRACETAM 1088 MG: 100 SOLUTION ORAL at 09:10

## 2020-10-19 RX ADMIN — BACLOFEN 10 MG: 10 TABLET ORAL at 09:10

## 2020-10-19 RX ADMIN — BACLOFEN 10 MG: 10 TABLET ORAL at 03:10

## 2020-10-19 NOTE — PLAN OF CARE
10/19/20 1305   Post-Acute Status   Post-Acute Authorization Placement     SW following patient and she will possibly dc back to [facility]  [phone] this evening. HARRIETT will continue to follow up        Lanie Pgaan LMSW  [facility]   [phone]

## 2020-10-19 NOTE — PROGRESS NOTES
Pt OES, moves RUE more than LUE, doesn't FC, doesn't really move BLE. Bilateral mitts in place to keep pt from pulling lines. However, pt bites the R mitt off and pulls L mitt off frequently. RA, lungs diminished. See FS for VS. Incontinent of urine x2, emesis x2. Zofran given x1. No residuals from PEG, no S/S of aspiration after emesis. Abdominal binder in place to protect PEG. Skin grossly intact.

## 2020-10-19 NOTE — PLAN OF CARE
Patient remained awake all night. Episodes of pounding her mittens against herself. Biting them in attempts to remove them. [place](peg) flushes fine tube feedings no residuals. Sats in low 90's patient removed o2 at every attempt. Side rails up. Seizure precautions maintained. Will continue to monitor

## 2020-10-19 NOTE — PLAN OF CARE
[facility]     Department of Hospital Medicine     [address] [place] [address]     [phone] after hours  [phone] fax       NURSING HOME ORDERS    10/19/2020    Admit to Nursing Home:  Regular Bed     Diagnoses:  Active Hospital Problems    Diagnosis  POA    *Pneumonia due to COVID-19 virus [U07.1, J12.89]  Yes    PEG tube malfunction [K94.23]  No    Sinus tachycardia [R00.0]  Yes    Hypoxia [R09.02]  Yes    Hypernatremia [E87.0]  Yes    Abnormal liver enzymes [R74.8]  Yes    Cerebral palsy [G80.9]  Yes      Resolved Hospital Problems   No resolved problems to display.       Patient is homebound due to:  Pneumonia due to COVID-19 virus    Allergies:Review of patient's allergies indicates:  No Known Allergies    Vitals: per facility protocol    Diet: NPO     Tube Feeding:  Isosource 1.5 at 40ml/hr, or resume previous tube feeds orders (from prior to admission)    Acitivities:  n/a    LABS:  Per facility protocol  Nursing Precautions:    - Aspiration precautions:             - Total assistance with meals            -  Upright 90 degrees befor during and after meals             -  Suction at bedside          - Fall precautions per nursing home protocol   - Seizure precaution per half-way protocol   - Decubitus precautions:        -  for positioning   - Pressure reducing foam mattress   - Turn patient every two hours. Use wedge pillows to anchor patient        MISCELLANEOUS CARE:       PEG Care:  Clean site every 24 hours       Routine Skin for Bedridden Patients:  Apply moisture barrier cream to all    skin folds and wet areas in perineal area daily and after baths and                           all bowel movements.                   Medications: Discontinue all previous medication orders, if any. See new list below.     Alexander Sainz   Home Medication Instructions PATEL:[id number]    Printed on:10/19/20 0901   Medication Information                       baclofen (LIORESAL) 10 MG tablet  Take 10 mg by mouth 3 (three) times daily.             calcium-vitamin D (CALCIUM-VITAMIN D) 500 mg(1,250mg) -200 unit per tablet  Take 1 tablet by mouth 2 (two) times daily with meals.               diazepam 20 mg Kit  Place rectally. Use prn for seizures              fluticasone (FLONASE) 50 mcg/actuation nasal spray  1 spray by Each Nare route once daily.             hydroxypropyl methylcellulose (ISOPTO TEARS) 2.5 % ophthalmic solution  1 drop as needed.               lactulose (CHRONULAC) 10 gram/15 mL solution  Take by mouth 3 (three) times daily.               levetiracetam (KEPPRA) 100 mg/mL Soln  Take 20 mg/kg by mouth 2 (two) times daily.               loratadine (CLARITIN) 10 mg tablet  Take 10 mg by mouth once daily.             melatonin 3 mg Tab  Take by mouth nightly as needed.               montelukast (SINGULAIR) 10 mg tablet  Take 10 mg by mouth every evening.             mv-min-FA-Uy-Hw-rivyjcn-lutein (MULTIVITAL) 0.4-162-18 mg Tab  Take by mouth.               pantoprazole (PROTONIX) 40 MG tablet  Take 40 mg by mouth once daily.               trypsin-balsam-castor oil (VASOLEX) 46- unit-mg-mg/gram Oint  Apply topically once daily. Apply daily to periwound as directed                       _________________________________  Vilma Mattson MD  10/19/2020

## 2020-10-20 LAB
ALBUMIN SERPL BCP-MCNC: 2.8 G/DL (ref 3.5–5.2)
ALP SERPL-CCNC: 196 U/L (ref 55–135)
ALT SERPL W/O P-5'-P-CCNC: 46 U/L (ref 10–44)
ANION GAP SERPL CALC-SCNC: 9 MMOL/L (ref 8–16)
AST SERPL-CCNC: 18 U/L (ref 10–40)
BASOPHILS # BLD AUTO: 0 K/UL (ref 0–0.2)
BASOPHILS NFR BLD: 0 % (ref 0–1.9)
BILIRUB SERPL-MCNC: 0.3 MG/DL (ref 0.1–1)
BUN SERPL-MCNC: 19 MG/DL (ref 6–20)
CALCIUM SERPL-MCNC: 9.5 MG/DL (ref 8.7–10.5)
CHLORIDE SERPL-SCNC: 106 MMOL/L (ref 95–110)
CO2 SERPL-SCNC: 30 MMOL/L (ref 23–29)
CREAT SERPL-MCNC: 0.6 MG/DL (ref 0.5–1.4)
CRP SERPL-MCNC: 6.4 MG/L (ref 0–8.2)
D DIMER PPP IA.FEU-MCNC: 0.64 MG/L FEU
DIFFERENTIAL METHOD: ABNORMAL
EOSINOPHIL # BLD AUTO: 0 K/UL (ref 0–0.5)
EOSINOPHIL NFR BLD: 1 % (ref 0–8)
ERYTHROCYTE [DISTWIDTH] IN BLOOD BY AUTOMATED COUNT: 13.2 % (ref 11.5–14.5)
EST. GFR  (AFRICAN AMERICAN): >60 ML/MIN/1.73 M^2
EST. GFR  (NON AFRICAN AMERICAN): >60 ML/MIN/1.73 M^2
FERRITIN SERPL-MCNC: 296 NG/ML (ref 20–300)
GLUCOSE SERPL-MCNC: 111 MG/DL (ref 70–110)
HCT VFR BLD AUTO: 35.3 % (ref 37–48.5)
HGB BLD-MCNC: 11.2 G/DL (ref 12–16)
IMM GRANULOCYTES # BLD AUTO: 0.05 K/UL (ref 0–0.04)
IMM GRANULOCYTES NFR BLD AUTO: 1.3 % (ref 0–0.5)
LDH SERPL L TO P-CCNC: 283 U/L (ref 110–260)
LYMPHOCYTES # BLD AUTO: 1.5 K/UL (ref 1–4.8)
LYMPHOCYTES NFR BLD: 36.6 % (ref 18–48)
MCH RBC QN AUTO: 29.5 PG (ref 27–31)
MCHC RBC AUTO-ENTMCNC: 31.7 G/DL (ref 32–36)
MCV RBC AUTO: 93 FL (ref 82–98)
MONOCYTES # BLD AUTO: 0.3 K/UL (ref 0.3–1)
MONOCYTES NFR BLD: 7.8 % (ref 4–15)
NEUTROPHILS # BLD AUTO: 2.1 K/UL (ref 1.8–7.7)
NEUTROPHILS NFR BLD: 53.3 % (ref 38–73)
NRBC BLD-RTO: 1 /100 WBC
PLATELET # BLD AUTO: 315 K/UL (ref 150–350)
PMV BLD AUTO: 11.1 FL (ref 9.2–12.9)
POTASSIUM SERPL-SCNC: 4 MMOL/L (ref 3.5–5.1)
PROT SERPL-MCNC: 7.9 G/DL (ref 6–8.4)
RBC # BLD AUTO: 3.8 M/UL (ref 4–5.4)
SODIUM SERPL-SCNC: 145 MMOL/L (ref 136–145)
WBC # BLD AUTO: 3.99 K/UL (ref 3.9–12.7)

## 2020-10-20 PROCEDURE — 25500020 PHARM REV CODE 255: Performed by: HOSPITALIST

## 2020-10-20 PROCEDURE — 99232 SBSQ HOSP IP/OBS MODERATE 35: CPT | Mod: ,,, | Performed by: HOSPITALIST

## 2020-10-20 PROCEDURE — 83615 LACTATE (LD) (LDH) ENZYME: CPT

## 2020-10-20 PROCEDURE — 80053 COMPREHEN METABOLIC PANEL: CPT

## 2020-10-20 PROCEDURE — 99232 PR SUBSEQUENT HOSPITAL CARE,LEVL II: ICD-10-PCS | Mod: ,,, | Performed by: HOSPITALIST

## 2020-10-20 PROCEDURE — 85379 FIBRIN DEGRADATION QUANT: CPT

## 2020-10-20 PROCEDURE — 86140 C-REACTIVE PROTEIN: CPT

## 2020-10-20 PROCEDURE — 85025 COMPLETE CBC W/AUTO DIFF WBC: CPT

## 2020-10-20 PROCEDURE — 94761 N-INVAS EAR/PLS OXIMETRY MLT: CPT

## 2020-10-20 PROCEDURE — 25000003 PHARM REV CODE 250: Performed by: INTERNAL MEDICINE

## 2020-10-20 PROCEDURE — 99900035 HC TECH TIME PER 15 MIN (STAT)

## 2020-10-20 PROCEDURE — 20600001 HC STEP DOWN PRIVATE ROOM

## 2020-10-20 PROCEDURE — 63600175 PHARM REV CODE 636 W HCPCS: Performed by: FAMILY MEDICINE

## 2020-10-20 PROCEDURE — 25000003 PHARM REV CODE 250: Performed by: FAMILY MEDICINE

## 2020-10-20 PROCEDURE — 36415 COLL VENOUS BLD VENIPUNCTURE: CPT

## 2020-10-20 PROCEDURE — 82728 ASSAY OF FERRITIN: CPT

## 2020-10-20 RX ADMIN — MELATONIN TAB 3 MG 3 MG: 3 TAB at 08:10

## 2020-10-20 RX ADMIN — BACLOFEN 10 MG: 10 TABLET ORAL at 09:10

## 2020-10-20 RX ADMIN — ENOXAPARIN SODIUM 40 MG: 40 INJECTION SUBCUTANEOUS at 08:10

## 2020-10-20 RX ADMIN — BACLOFEN 10 MG: 10 TABLET ORAL at 08:10

## 2020-10-20 RX ADMIN — LEVETIRACETAM 1088 MG: 100 SOLUTION ORAL at 09:10

## 2020-10-20 RX ADMIN — DEXAMETHASONE SODIUM PHOSPHATE 6 MG: 4 INJECTION INTRA-ARTICULAR; INTRALESIONAL; INTRAMUSCULAR; INTRAVENOUS; SOFT TISSUE at 09:10

## 2020-10-20 RX ADMIN — LEVETIRACETAM 1088 MG: 100 SOLUTION ORAL at 08:10

## 2020-10-20 RX ADMIN — IOHEXOL 75 ML: 350 INJECTION, SOLUTION INTRAVENOUS at 02:10

## 2020-10-20 RX ADMIN — BACLOFEN 10 MG: 10 TABLET ORAL at 02:10

## 2020-10-20 RX ADMIN — THERA TABS 1 TABLET: TAB at 09:10

## 2020-10-20 NOTE — NURSING
Pt cleaned, linen changed, rectal thermometer probe placed, warming blanket placed on pt. Pt continues to sleep, no concerns at this time.

## 2020-10-20 NOTE — PLAN OF CARE
10/20/20 1310   Post-Acute Status   Post-Acute Authorization Placement     HARRIETT spoke to [facility] admissions coordinator with [facility] and she reports that patients vital signs are too low for her to be admitted back there today. [facility] contacted [facility] and [facility] and will inform physician of this information.    HARRIETT followed back up with [facility] admissions coordinator with [facility] and she reports that she will speak with her director in regard of patient being admitted but he will most likely have to be tomorrow. HARRIETT informed medical team.    Lanie Pagan LMSW  [facility]   [phone]

## 2020-10-20 NOTE — PLAN OF CARE
Not stable for discharge. CT head today / hypotensive - weaned off O2. Look to discharge on 10/21/2020 if stable    Magalys Gold RN  Case Management  Ext [phone]       10/20/20 1426   Discharge Reassessment   Assessment Type Discharge Planning Reassessment   Provided patient/caregiver education on the expected discharge date and the discharge plan Yes   Do you have any problems affording any of your prescribed medications? No   Discharge Plan A Return to nursing home  ([facility])   DME Needed Upon Discharge    (bed/wc bound - total care patient)   Patient choice form signed by patient/caregiver N/A   Anticipated Discharge Disposition assisted Nu   Can the patient/caregiver answer the patient profile reliably? No, cognitively impaired   Post-Acute Status   Discharge Delays None known at this time

## 2020-10-20 NOTE — PLAN OF CARE
SW following patient expected to dc back to [facility] today. SW set up transportation for patient will be admitting back to [facility] today. Transport set up with [facility] for 2:00 pm. Nurse call report to [phone]        Lanie Pagan LMSW  [facility]   [id number]

## 2020-10-20 NOTE — NURSING
Unable to obtain accurate axillary temp, reading 94*. Rectal temp done showing 94.7*. Call to MD to make aware, orders for bear hugger blanket and rectal temp probe to monitor temperature. WCTM

## 2020-10-20 NOTE — PROGRESS NOTES
Hospital Medicine  Progress Note  [facility]      Patient Name: Alexander Sainz  MRN:  [id number]  Hospital Medicine Team: [facility] Vilma Mattson MD  Date of Admission:  10/11/2020     Length of Stay:  LOS: 9 days       Principal Problem:  Pneumonia due to COVID-19 virus      HPI:  30yoF with severe MR, cerebral palsy, spina bifida, chronic transaminitis, chronic hypernatremia, lives at [facility], who presented to  ED with fever 102 at home, and hypoxemic resp failure with sats in the field 88%.  In ED, temp 99, Tachy 119, O2 sats 82% on RA and improved to 90s on 2-3L.  CXR with fairly dense infiltrate in RML - appears bacterial in nature.  Was Tx-ed for bacterial PNA with Rocephin and Azithro. Covid +  and called for transfer to [facility].  One dose of IV Dexa.     Interval History:     Head CT pending. Doing well on room air. No acute events overnight. BP's stable and at baseline. Temperatures at pt's baseline. COVID markers are all improving nicely. Awaiting return to [facility].     Review of Systems:  Unable to obtain, patient non verbal    Inpatient Medications:    Current Facility-Administered Medications:     baclofen tablet 10 mg, 10 mg, Per G Tube, TID, Augie Calvo MD, 10 mg at 10/20/20 0916    dextrose 50% injection 12.5 g, 12.5 g, Intravenous, PRN, Augie Calvo MD    dextrose 50% injection 25 g, 25 g, Intravenous, PRN, Augie Calvo MD    diazePAM 5-7.5-10 mg (DIASTAT ACUDIAL) rectal kit 20 mg, 20 mg, Rectal, PRN, Augie Calvo MD, 10 mg at 10/17/20 0110    enoxaparin injection 40 mg, 40 mg, Subcutaneous, Q24H, Augie Calvo MD, 40 mg at 10/19/20 2146    glucagon (human recombinant) injection 1 mg, 1 mg, Intramuscular, PRN, Augie Calvo MD    lactulose 10 gram/15 mL solution (enema) 200 g, 200 g, Rectal, BID PRN, Augie Calvo MD    levetiracetam oral soln Soln 1,088 mg, 20 mg/kg, Per G Tube, BID, Augie Calvo MD, 1,088 mg at 10/20/20  "0916    lidocaine (PF) 10 mg/ml (1%) injection 100 mg, 10 mL, Other, Once, Lukasz Junior MD    melatonin tablet 3 mg, 3 mg, Per G Tube, Nightly PRN, Augie Calvo MD, 3 mg at 10/19/20 2146    multivitamin tablet, 1 tablet, Oral, Daily, Lokesh Mishra MD, 1 tablet at 10/20/20 0916    ondansetron injection 4 mg, 4 mg, Intravenous, Q6H PRN, Vilma Mattson MD, 4 mg at 10/19/20 1614    sodium chloride 0.9% flush 10 mL, 10 mL, Intravenous, PRN, Augie Calvo MD      Physical Exam:      Intake/Output Summary (Last 24 hours) at 10/20/2020 1420  Last data filed at 10/20/2020 0911  Gross per 24 hour   Intake 1582 ml   Output 1 ml   Net 1581 ml     Wt Readings from Last 3 Encounters:   10/16/20 53.6 kg (118 lb 2.7 oz)   10/11/20 54.4 kg (119 lb 14.9 oz)   05/28/19 54.4 kg (120 lb)       /63 (BP Location: Right arm, Patient Position: Lying)   Pulse 68   Temp 97.7 °F (36.5 °C) (Rectal)   Resp 16   Ht 5' 1" (1.549 m)   Wt 53.6 kg (118 lb 2.7 oz)   LMP  (LMP Unknown)   SpO2 (!) 92%   Breastfeeding No   BMI 22.33 kg/m²     GEN: NAD, opens eyes to sternal rub  Resp: CTA, no wheezes or rales, normal work of breathing, comfortable on room air  CV: RRR, no m/r/g, no edema  GI: soft, NTND  Skin: no rash  Neuro: PERRL    Laboratory:  Lab Results   Component Value Date    [id number] Detected (A) 10/18/2020       Recent Labs   Lab 10/16/20  0246 10/18/20  0146 10/20/20  0205   WBC 3.86* 4.78 3.99   LYMPH 29.8  1.2 35.1  1.7 36.6  1.5   HGB 9.6* 11.4* 11.2*   HCT 29.9* 36.1* 35.3*    248 315     Recent Labs   Lab 10/14/20  0452 10/15/20  0516 10/16/20  0246 10/18/20  0145 10/20/20  0205   * 147* 146* 146* 145   K 3.9 3.9 3.7 4.0 4.0   * 110 109 107 106   CO2 27 29 26 27 30*   BUN 18 15 15 19 19   CREATININE 0.6 0.5 0.5 0.6 0.6   * 72 121* 82 111*   CALCIUM 8.9 8.7 9.2 9.9 9.5   MG 2.0 1.7 1.8  --   --    PHOS 3.6 3.6 3.3  --   --      Recent Labs   Lab 10/16/20  0246 " 10/18/20  0145 10/20/20  0205   ALKPHOS 200* 213* 196*   ALT 66* 60* 46*   AST 35 30 18   ALBUMIN 2.3* 2.7* 2.8*   PROT 6.7 7.8 7.9   BILITOT 0.2 0.3 0.3        Recent Labs     10/18/20  0145 10/20/20  0205   DDIMER 2.30* 0.64*   FERRITIN 362* 296   CRP 23.8* 6.4   * 283*       All labs within the last 24 hours were reviewed.     Microbiology:  Microbiology Results (last 7 days)     Procedure Component Value Units Date/Time    Blood culture [[id number]] Collected: 10/13/20 1103    Order Status: Completed Specimen: Blood from Peripheral, Site Unknown Updated: 10/18/20 1412     Blood Culture, Routine No growth after 5 days.    Blood culture [[id number]] Collected: 10/13/20 1115    Order Status: Completed Specimen: Blood Updated: 10/18/20 1412     Blood Culture, Routine No growth after 5 days.    Blood culture [[id number]] Collected: 10/12/20 0958    Order Status: Completed Specimen: Blood from Peripheral, Right Hand Updated: 10/17/20 1412     Blood Culture, Routine No growth after 5 days.    Blood culture [[id number]] Collected: 10/12/20 0952    Order Status: Completed Specimen: Blood from Peripheral, Right Hand Updated: 10/17/20 1412     Blood Culture, Routine No growth after 5 days.    Blood culture x two cultures. Draw prior to antibiotics. [[id number]] Collected: 10/11/20 0815    Order Status: Completed Specimen: Blood from Peripheral, Antecubital, Right Updated: 10/16/20 0903     Blood Culture, Routine No growth after 5 days.    Narrative:      Aerobic and anaerobic    Blood culture x two cultures. Draw prior to antibiotics. [[id number]]  (Abnormal) Collected: 10/11/20 0735    Order Status: Completed Specimen: Blood from Peripheral, Hand, Left Updated: 10/15/20 0746     Blood Culture, Routine Gram stain aer bottle: Gram positive cocci in clusters resembling Staph       Results called to and read back by: Betzy Wolfe 10/12/2020  08:51      COAGULASE-NEGATIVE STAPHYLOCOCCUS SPECIES  Organism is a probable  contaminant      Narrative:      Aerobic and anaerobic            Imaging  ECG Results          EKG 12-lead (Final result)  Result time 10/13/20 19:55:01    Final result by Interface, Lab In [facility] (10/13/20 19:55:01)                 Narrative:    Test Reason : R00.0,    Vent. Rate : 120 BPM     Atrial Rate : 120 BPM     P-R Int : 146 ms          QRS Dur : 086 ms      QT Int : 316 ms       P-R-T Axes : 045 076 -03 degrees     QTc Int : 446 ms    Sinus tachycardia  Possible Left atrial enlargement  Nonspecific T wave abnormality  Abnormal ECG  When compared with ECG of 07-JUN-2015 08:49,  Significant changes have occurred  Confirmed by Magdy Gomez MD ([id number]) on 10/13/2020 7:54:44 PM    Referred By: ALEKSANDAR   SELF           Confirmed By:Magdy Gomez MD                              No results found for this or any previous visit.    X-Ray Abdomen AP 1 View  Narrative: EXAMINATION:  XR ABDOMEN AP 1 VIEW    CLINICAL HISTORY:  Ube study, to be taken right after administration of contrast into g tube;    TECHNIQUE:  AP View(s) of the abdomen was performed.    COMPARISON:  10/11/2020    FINDINGS:  Single-view abdomen supine.    70 cc of omni 350 was injected through patient's enteric tube.  There is contrast seen within the duodenum as well as within the proximal to mid jejunum.  No extraluminal contrast identified noting pre-injection imaging was not performed.  There is moderate stool in the colon.  The visualized lung zones appears stable although obscured.  Dedicated chest radiography as warranted.  Impression: As above    Electronically signed by: Vinny Lanza MD  Date:    10/18/2020  Time:    17:38      All imaging within the last 24 hours was reviewed.     Assessment and Plan:    Active Hospital Problems    Diagnosis  POA    *Pneumonia due to COVID-19 virus [U07.1, J12.89]  Yes    PEG tube malfunction [K94.23]  No    Sinus tachycardia [R00.0]  Yes    Hypoxia [R09.02]  Yes    Hypernatremia [E87.0]   Yes    Abnormal liver enzymes [R74.8]  Yes    Cerebral palsy [G80.9]  Yes      Resolved Hospital Problems   No resolved problems to display.         COVID-19 Virus Infection  Viral Pneumonia due to COVID-19    -Completed CAP coverage w/ abx  -Completed remdesivir/dexamethasone  - COVID-19 testing:   - Isolation: Airborne/Droplet. Surgical mask on patient. Notify Infection Control  - Diagnostics: Trend Q48hrs if stable, more frequently if patient decompensating     Laboratory/Study Frequency Result   CBC Admit & Q48h    CMP Admit & Q48h LFTs improved   Magnesium level Admit    D-dimer Admit & Q48h 2.1 > 2.16 > 1.2 > 2.3 > 0.6   Ferritin Admit & Q48h 1000 > 1214 > 432 > 362 > 296   CRP Admit & Q48h 182.8 > 46.3 > 58 > 23 > 6   CPK Admit & Q24h if elevated 95   LDH Admit & Q48h 579 > 426 > 444 > 283   Vitamin D Admit 54   BNP Admit    Troponin Admit & Q6h if elevated    Glucose-6-phos dehydrogenase Admit    Procalcitonin Admit .62   Lipid Panel If using statin    Rapid influenza Admit -   Resp Infection Panel Admit if BMT/organ transplant    Legionella Antigen Admit ordered   Sputum Culture Admit ordered   Blood Culture Admit 1/2+   Urinalysis & Culture Admit    ECG Admit & Q48h if on HCQ    CXR Admit    Lymphopenia, hyponatremia, hyperferritinemia, elevated troponin, elevated d-dimer, age, and medical comorbidities are significant predictors of poor clinical outcome    - Management: per [facility] COVID Treatment Protocol (4/15/20)    - Monitoring:   - Telemetry & Continuous Pulse Oximetry    - Nutrition:    - Multivitamin PO daily   - Add Boost supplement   - Vitamin D 1000IU daily if deficient   - Ascorbic acid 500mg PO bid    - Supportive Care:   - acetaminophen 650mg PO Q6hr PRN fever/headache   - loperamide PRN viral diarrhea   - IVF if indicated, restrictive strategy preferred, no maintenance IV if able   - VTE PPx: enoxaparin or heparin SQ unless contraindicated    - Antibiotics:  - if indications, CXR findings,  elevated procal. See protocol for alternatives.   - Discontinue early if low concern for bacterial co-infection   - ceftriaxone 1g Q24h x 5 days  - azithromycin 500mg po x1, then 250mg po daily x 4 days    - Investigational Therapies:   - If patient meets criteria    Acute Hypoxemic Respiratory Failure, Resolved  - Order RT consult via Respiratory Communication for COVID Protocols  - Order Incentive Spirometer Q4h  - Order Flutter Valve Q4h  - Continuous Pulse Oximetry  - Goal SpO2 92-96%  - Supplemental O2 via LFNC, VentiMask, or HFNC (see Respiratory Support Oxygen Therapies)  - If wheezing, albuterol INH Q6h scheduled & PRN  - Proning Protocol if patient is a candidate (see HM Proning Protocol)   - GCS >13, able to self-prone  - If deterioration, may warrant trial of NIPPV and transfer to [facility] pressure room or immediate ICU consult  - Now on room air     PEG Tube Malfunction  -Pt pulled out PEG 10/18. Discussed w/ Surgery - [place] inserted. Ok to use for tube feeds, etc. Ok to discharge with. Abd binder so patient does not remove in the future.     HyperNA  Resolved     CP   Spastic/sz/developmentally delayed  Nonverbal, spastic  Continue baclofen  No acute issues    Seizure  Had rhythmic eye rolling c/f seizure. Resolved after Valium given.   Discuss w/ Neurology.   Seizure precautions. Continue home keppra  Head CT w/ contrast    Diet: Isosource 1.5 at 40ml/hr for 733ml  Code; Full  Ppx: lovenox  Dispo: back to [facility] tomorrow      VTE High Risk Prophylaxis:   VTE Risk Mitigation (From admission, onward)         Ordered     enoxaparin injection 40 mg  Every 24 hours      10/11/20 1334     IP VTE HIGH RISK PATIENT  Once      10/11/20 1334     Place sequential compression device  Until discontinued      10/11/20 1334              Pt has the following problems which are a threat to life and bodily function, and are unstable: acute resp failure, COVID viral PNA    Vilma Mattson MD  [facility] Medicine  Staff  [facility]

## 2020-10-20 NOTE — PLAN OF CARE
Pt is nonverbal. VS WNL ,O2 95% on room air ,rectal t is 99.0 Pt is on cont TF Isosource 1.5 40 ml/h,tolerates well. Water bolus 175 ml given q6 through PEG tube. AVASYS in the room,pt clean&changed 2 times,no BM today

## 2020-10-20 NOTE — PROGRESS NOTES
Hospital Medicine  Progress Note  [facility]      Patient Name: Alexander Sainz  MRN:  [id number]  Hospital Medicine Team: [facility] MED R Vilma Mattson MD  Date of Admission:  10/11/2020     Length of Stay:  LOS: 9 days       Principal Problem:  Pneumonia due to COVID-19 virus      HPI:  30yoF with severe MR, cerebral palsy, spina bifida, chronic transaminitis, chronic hypernatremia, lives at [facility], who presented to  ED with fever 102 at home, and hypoxemic resp failure with sats in the field 88%.  In ED, temp 99, Tachy 119, O2 sats 82% on RA and improved to 90s on 2-3L.  CXR with fairly dense infiltrate in RML - appears bacterial in nature.  Was Tx-ed for bacterial PNA with Rocephin and Azithro. Covid +  and called for transfer to [facility].  One dose of IV Dexa.     Interval History:     Neuro recommending head CT w/ contrast for seizure history.     Review of Systems:  Unable to obtain, patient non verbal    Inpatient Medications:    Current Facility-Administered Medications:     baclofen tablet 10 mg, 10 mg, Per G Tube, TID, Augie Calvo MD, 10 mg at 10/20/20 0916    dextrose 50% injection 12.5 g, 12.5 g, Intravenous, PRN, Augie Calvo MD    dextrose 50% injection 25 g, 25 g, Intravenous, PRN, Augie Calvo MD    diazePAM 5-7.5-10 mg (DIASTAT ACUDIAL) rectal kit 20 mg, 20 mg, Rectal, PRN, Augie Calvo MD, 10 mg at 10/17/20 0110    enoxaparin injection 40 mg, 40 mg, Subcutaneous, Q24H, Augie Calvo MD, 40 mg at 10/19/20 2146    glucagon (human recombinant) injection 1 mg, 1 mg, Intramuscular, PRN, Augie Calvo MD    lactulose 10 gram/15 mL solution (enema) 200 g, 200 g, Rectal, BID PRN, Augie Calvo MD    levetiracetam oral soln Soln 1,088 mg, 20 mg/kg, Per G Tube, BID, Augie Calvo MD, 1,088 mg at 10/20/20 0916    lidocaine (PF) 10 mg/ml (1%) injection 100 mg, 10 mL, Other, Once, Lukasz Junior MD    melatonin tablet 3 mg, 3 mg, Per G  "Tube, Nightly PRN, Augie Calvo MD, 3 mg at 10/19/20 2146    multivitamin tablet, 1 tablet, Oral, Daily, Lokesh Mishra MD, 1 tablet at 10/20/20 0916    ondansetron injection 4 mg, 4 mg, Intravenous, Q6H PRN, Vilma Mattson MD, 4 mg at 10/19/20 1614    sodium chloride 0.9% flush 10 mL, 10 mL, Intravenous, PRN, Augie Calvo MD      Physical Exam:      Intake/Output Summary (Last 24 hours) at 10/20/2020 1419  Last data filed at 10/20/2020 0911  Gross per 24 hour   Intake 1582 ml   Output 1 ml   Net 1581 ml     Wt Readings from Last 3 Encounters:   10/16/20 53.6 kg (118 lb 2.7 oz)   10/11/20 54.4 kg (119 lb 14.9 oz)   05/28/19 54.4 kg (120 lb)       /63 (BP Location: Right arm, Patient Position: Lying)   Pulse 68   Temp 97.7 °F (36.5 °C) (Rectal)   Resp 16   Ht 5' 1" (1.549 m)   Wt 53.6 kg (118 lb 2.7 oz)   LMP  (LMP Unknown)   SpO2 (!) 92%   Breastfeeding No   BMI 22.33 kg/m²     GEN: NAD, opens eyes to sternal rub  Resp: coarse bilateral breath sounds, no wheezes or rales, normal work of breathing   CV: RRR, no m/r/g, no edema  GI: soft, NTND  Skin: no rash  Neuro: PERRL    Laboratory:  Lab Results   Component Value Date    [id number] Detected (A) 10/18/2020       Recent Labs   Lab 10/16/20  0246 10/18/20  0146 10/20/20  0205   WBC 3.86* 4.78 3.99   LYMPH 29.8  1.2 35.1  1.7 36.6  1.5   HGB 9.6* 11.4* 11.2*   HCT 29.9* 36.1* 35.3*    248 315     Recent Labs   Lab 10/14/20  0452 10/15/20  0516 10/16/20  0246 10/18/20  0145 10/20/20  0205   * 147* 146* 146* 145   K 3.9 3.9 3.7 4.0 4.0   * 110 109 107 106   CO2 27 29 26 27 30*   BUN 18 15 15 19 19   CREATININE 0.6 0.5 0.5 0.6 0.6   * 72 121* 82 111*   CALCIUM 8.9 8.7 9.2 9.9 9.5   MG 2.0 1.7 1.8  --   --    PHOS 3.6 3.6 3.3  --   --      Recent Labs   Lab 10/16/20  0246 10/18/20  0145 10/20/20  0205   ALKPHOS 200* 213* 196*   ALT 66* 60* 46*   AST 35 30 18   ALBUMIN 2.3* 2.7* 2.8*   PROT 6.7 7.8 7.9   BILITOT 0.2 " 0.3 0.3        Recent Labs     10/18/20  0145 10/20/20  0205   DDIMER 2.30* 0.64*   FERRITIN 362* 296   CRP 23.8* 6.4   * 283*       All labs within the last 24 hours were reviewed.     Microbiology:  Microbiology Results (last 7 days)     Procedure Component Value Units Date/Time    Blood culture [[id number]] Collected: 10/13/20 1103    Order Status: Completed Specimen: Blood from Peripheral, Site Unknown Updated: 10/18/20 1412     Blood Culture, Routine No growth after 5 days.    Blood culture [[id number]] Collected: 10/13/20 1115    Order Status: Completed Specimen: Blood Updated: 10/18/20 1412     Blood Culture, Routine No growth after 5 days.    Blood culture [[id number]] Collected: 10/12/20 0958    Order Status: Completed Specimen: Blood from Peripheral, Right Hand Updated: 10/17/20 1412     Blood Culture, Routine No growth after 5 days.    Blood culture [[id number]] Collected: 10/12/20 0952    Order Status: Completed Specimen: Blood from Peripheral, Right Hand Updated: 10/17/20 1412     Blood Culture, Routine No growth after 5 days.    Blood culture x two cultures. Draw prior to antibiotics. [[id number]] Collected: 10/11/20 0815    Order Status: Completed Specimen: Blood from Peripheral, Antecubital, Right Updated: 10/16/20 0903     Blood Culture, Routine No growth after 5 days.    Narrative:      Aerobic and anaerobic    Blood culture x two cultures. Draw prior to antibiotics. [[id number]]  (Abnormal) Collected: 10/11/20 0735    Order Status: Completed Specimen: Blood from Peripheral, Hand, Left Updated: 10/15/20 0746     Blood Culture, Routine Gram stain aer bottle: Gram positive cocci in clusters resembling Staph       Results called to and read back by: Betzy Wolfe 10/12/2020  08:51      COAGULASE-NEGATIVE STAPHYLOCOCCUS SPECIES  Organism is a probable contaminant      Narrative:      Aerobic and anaerobic            Imaging  ECG Results          EKG 12-lead (Final result)  Result time 10/13/20  19:55:01    Final result by Interface, Lab [facility] (10/13/20 19:55:01)                 Narrative:    Test Reason : R00.0,    Vent. Rate : 120 BPM     Atrial Rate : 120 BPM     P-R Int : 146 ms          QRS Dur : 086 ms      QT Int : 316 ms       P-R-T Axes : 045 076 -03 degrees     QTc Int : 446 ms    Sinus tachycardia  Possible Left atrial enlargement  Nonspecific T wave abnormality  Abnormal ECG  When compared with ECG of 07-JUN-2015 08:49,  Significant changes have occurred  Confirmed by Magdy Gomez MD ([id number]) on 10/13/2020 7:54:44 PM    Referred By: ALEKSANDAR   SELF           Confirmed By:Magdy Gomez MD                              No results found for this or any previous visit.    X-Ray Abdomen AP 1 View  Narrative: EXAMINATION:  XR ABDOMEN AP 1 VIEW    CLINICAL HISTORY:  Ube study, to be taken right after administration of contrast into g tube;    TECHNIQUE:  AP View(s) of the abdomen was performed.    COMPARISON:  10/11/2020    FINDINGS:  Single-view abdomen supine.    70 cc of omni 350 was injected through patient's enteric tube.  There is contrast seen within the duodenum as well as within the proximal to mid jejunum.  No extraluminal contrast identified noting pre-injection imaging was not performed.  There is moderate stool in the colon.  The visualized lung zones appears stable although obscured.  Dedicated chest radiography as warranted.  Impression: As above    Electronically signed by: Vinny Lanza MD  Date:    10/18/2020  Time:    17:38      All imaging within the last 24 hours was reviewed.     Assessment and Plan:    Active Hospital Problems    Diagnosis  POA    *Pneumonia due to COVID-19 virus [U07.1, J12.89]  Yes    PEG tube malfunction [K94.23]  No    Sinus tachycardia [R00.0]  Yes    Hypoxia [R09.02]  Yes    Hypernatremia [E87.0]  Yes    Abnormal liver enzymes [R74.8]  Yes    Cerebral palsy [G80.9]  Yes      Resolved Hospital Problems   No resolved problems to display.          COVID-19 Virus Infection  Viral Pneumonia due to COVID-19    -Completed CAP coverage w/ abx  -On remdesivir/dexamethasone  - COVID-19 testing:   - Isolation: Airborne/Droplet. Surgical mask on patient. Notify Infection Control  - Diagnostics: Trend Q48hrs if stable, more frequently if patient decompensating     Laboratory/Study Frequency Result   CBC Admit & Q48h    CMP Admit & Q48h LFTs elevated   Magnesium level Admit    D-dimer Admit & Q48h 2.1 > 2.16 > 1.2 > 2.3   Ferritin Admit & Q48h 1000 > 1214 > 432 > 362   CRP Admit & Q48h 182.8 > 46.3 > 58 > 23   CPK Admit & Q24h if elevated 95   LDH Admit & Q48h 579 > 426 > 444   Vitamin D Admit 54   BNP Admit    Troponin Admit & Q6h if elevated    Glucose-6-phos dehydrogenase Admit    Procalcitonin Admit .62   Lipid Panel If using statin    Rapid influenza Admit -   Resp Infection Panel Admit if BMT/organ transplant    Legionella Antigen Admit ordered   Sputum Culture Admit ordered   Blood Culture Admit 1/2+   Urinalysis & Culture Admit    ECG Admit & Q48h if on HCQ    CXR Admit    Lymphopenia, hyponatremia, hyperferritinemia, elevated troponin, elevated d-dimer, age, and medical comorbidities are significant predictors of poor clinical outcome    - Management: per [facility] COVID Treatment Protocol (4/15/20)    - Monitoring:   - Telemetry & Continuous Pulse Oximetry    - Nutrition:    - Multivitamin PO daily   - Add Boost supplement   - Vitamin D 1000IU daily if deficient   - Ascorbic acid 500mg PO bid    - Supportive Care:   - acetaminophen 650mg PO Q6hr PRN fever/headache   - loperamide PRN viral diarrhea   - IVF if indicated, restrictive strategy preferred, no maintenance IV if able   - VTE PPx: enoxaparin or heparin SQ unless contraindicated    - Antibiotics:  - if indications, CXR findings, elevated procal. See protocol for alternatives.   - Discontinue early if low concern for bacterial co-infection   - ceftriaxone 1g Q24h x 5 days  - azithromycin 500mg po  x1, then 250mg po daily x 4 days    - Investigational Therapies:   - If patient meets criteria    Acute Hypoxemic Respiratory Failure  - Order RT consult via Respiratory Communication for COVID Protocols  - Order Incentive Spirometer Q4h  - Order Flutter Valve Q4h  - Continuous Pulse Oximetry  - Goal SpO2 92-96%  - Supplemental O2 via LFNC, VentiMask, or HFNC (see Respiratory Support Oxygen Therapies)  - If wheezing, albuterol INH Q6h scheduled & PRN  - Proning Protocol if patient is a candidate (see  Proning Protocol)   - GCS >13, able to self-prone  - If deterioration, may warrant trial of NIPPV and transfer to [facility] room or immediate ICU consult  - Now on room air     PEG Tube Malfunction  -pt pulled out PEG 10/18. Discussed w/ Surgery for reinsertion.     HyperNA  Unclear if patient had been getting free water pushes through PEG tube.   -continue 175ml  QID  -improving  -monitor     CP   Complications  Spastic/sz/developmentally delayed  Nonverbal, spastic  Continue baclofen      Seizure  Had rhythmic eye rolling c/f seizure. Resolved after Valium given.   Discuss w/ Neurology.   Seizure precautions. Continue home keppra    Diet: Isosource 1.5 at 40ml/hr for 733ml  Code; Full  Ppx: lovenox  Dispo: back to [facility] if we can wean o2, if not, may need alternative arrangement since does not have ability to support o2 need      VTE High Risk Prophylaxis:   VTE Risk Mitigation (From admission, onward)         Ordered     enoxaparin injection 40 mg  Every 24 hours      10/11/20 1334     IP VTE HIGH RISK PATIENT  Once      10/11/20 1334     Place sequential compression device  Until discontinued      10/11/20 1334              Pt has the following problems which are a threat to life and bodily function, and are unstable: acute resp failure, COVID viral PNA    Vilma Mattson MD  Hospital Medicine Staff  [facility]

## 2020-10-20 NOTE — PLAN OF CARE
Pt continues to be covid +, on RA, pt temperature low, MD notified, orders received. Pt resting comfortable overnight. Pt continues to pull mitts off. No adverse events. WCTM

## 2020-10-21 ENCOUNTER — TELEPHONE (OUTPATIENT)
Dept: INTERNAL MEDICINE | Facility: CLINIC | Age: 30
End: 2020-10-21

## 2020-10-21 LAB
ALBUMIN SERPL BCP-MCNC: 2.8 G/DL (ref 3.5–5.2)
ALP SERPL-CCNC: 170 U/L (ref 55–135)
ALT SERPL W/O P-5'-P-CCNC: 35 U/L (ref 10–44)
ANION GAP SERPL CALC-SCNC: 6 MMOL/L (ref 8–16)
AST SERPL-CCNC: 15 U/L (ref 10–40)
BACTERIA #/AREA URNS AUTO: ABNORMAL /HPF
BASOPHILS # BLD AUTO: 0.02 K/UL (ref 0–0.2)
BASOPHILS NFR BLD: 0.4 % (ref 0–1.9)
BILIRUB SERPL-MCNC: 0.2 MG/DL (ref 0.1–1)
BILIRUB UR QL STRIP: NEGATIVE
BUN SERPL-MCNC: 16 MG/DL (ref 6–20)
CALCIUM SERPL-MCNC: 9.2 MG/DL (ref 8.7–10.5)
CHLORIDE SERPL-SCNC: 106 MMOL/L (ref 95–110)
CLARITY UR REFRACT.AUTO: ABNORMAL
CO2 SERPL-SCNC: 32 MMOL/L (ref 23–29)
COLOR UR AUTO: YELLOW
CREAT SERPL-MCNC: 0.6 MG/DL (ref 0.5–1.4)
CRP SERPL-MCNC: 2.5 MG/L (ref 0–8.2)
D DIMER PPP IA.FEU-MCNC: 0.48 MG/L FEU
DIFFERENTIAL METHOD: ABNORMAL
EOSINOPHIL # BLD AUTO: 0 K/UL (ref 0–0.5)
EOSINOPHIL NFR BLD: 0.6 % (ref 0–8)
ERYTHROCYTE [DISTWIDTH] IN BLOOD BY AUTOMATED COUNT: 13.5 % (ref 11.5–14.5)
EST. GFR  (AFRICAN AMERICAN): >60 ML/MIN/1.73 M^2
EST. GFR  (NON AFRICAN AMERICAN): >60 ML/MIN/1.73 M^2
FERRITIN SERPL-MCNC: 281 NG/ML (ref 20–300)
GLUCOSE SERPL-MCNC: 113 MG/DL (ref 70–110)
GLUCOSE UR QL STRIP: NEGATIVE
HCT VFR BLD AUTO: 35.6 % (ref 37–48.5)
HGB BLD-MCNC: 11.3 G/DL (ref 12–16)
HGB UR QL STRIP: ABNORMAL
IMM GRANULOCYTES # BLD AUTO: 0.04 K/UL (ref 0–0.04)
IMM GRANULOCYTES NFR BLD AUTO: 0.8 % (ref 0–0.5)
KETONES UR QL STRIP: NEGATIVE
LDH SERPL L TO P-CCNC: 265 U/L (ref 110–260)
LEUKOCYTE ESTERASE UR QL STRIP: ABNORMAL
LYMPHOCYTES # BLD AUTO: 2.1 K/UL (ref 1–4.8)
LYMPHOCYTES NFR BLD: 43.9 % (ref 18–48)
MCH RBC QN AUTO: 29.9 PG (ref 27–31)
MCHC RBC AUTO-ENTMCNC: 31.7 G/DL (ref 32–36)
MCV RBC AUTO: 94 FL (ref 82–98)
MICROSCOPIC COMMENT: ABNORMAL
MONOCYTES # BLD AUTO: 0.4 K/UL (ref 0.3–1)
MONOCYTES NFR BLD: 9.1 % (ref 4–15)
NEUTROPHILS # BLD AUTO: 2.2 K/UL (ref 1.8–7.7)
NEUTROPHILS NFR BLD: 45.2 % (ref 38–73)
NITRITE UR QL STRIP: NEGATIVE
NON-SQ EPI CELLS #/AREA URNS AUTO: 14 /HPF
NRBC BLD-RTO: 1 /100 WBC
PH UR STRIP: 5 [PH] (ref 5–8)
PLATELET # BLD AUTO: 332 K/UL (ref 150–350)
PMV BLD AUTO: 11.4 FL (ref 9.2–12.9)
POTASSIUM SERPL-SCNC: 3.7 MMOL/L (ref 3.5–5.1)
PROCALCITONIN SERPL IA-MCNC: 0.02 NG/ML
PROT SERPL-MCNC: 7.4 G/DL (ref 6–8.4)
PROT UR QL STRIP: NEGATIVE
RBC # BLD AUTO: 3.78 M/UL (ref 4–5.4)
RBC #/AREA URNS AUTO: 89 /HPF (ref 0–4)
SODIUM SERPL-SCNC: 144 MMOL/L (ref 136–145)
SP GR UR STRIP: 1.03 (ref 1–1.03)
SQUAMOUS #/AREA URNS AUTO: 0 /HPF
URN SPEC COLLECT METH UR: ABNORMAL
WBC # BLD AUTO: 4.81 K/UL (ref 3.9–12.7)
WBC #/AREA URNS AUTO: 51 /HPF (ref 0–5)
WBC CLUMPS UR QL AUTO: ABNORMAL

## 2020-10-21 PROCEDURE — 25000003 PHARM REV CODE 250: Performed by: INTERNAL MEDICINE

## 2020-10-21 PROCEDURE — 80053 COMPREHEN METABOLIC PANEL: CPT

## 2020-10-21 PROCEDURE — 36415 COLL VENOUS BLD VENIPUNCTURE: CPT

## 2020-10-21 PROCEDURE — 25000003 PHARM REV CODE 250: Performed by: FAMILY MEDICINE

## 2020-10-21 PROCEDURE — 99900035 HC TECH TIME PER 15 MIN (STAT)

## 2020-10-21 PROCEDURE — 84145 PROCALCITONIN (PCT): CPT

## 2020-10-21 PROCEDURE — 63600175 PHARM REV CODE 636 W HCPCS: Performed by: HOSPITALIST

## 2020-10-21 PROCEDURE — 99232 PR SUBSEQUENT HOSPITAL CARE,LEVL II: ICD-10-PCS | Mod: ,,, | Performed by: HOSPITALIST

## 2020-10-21 PROCEDURE — 83615 LACTATE (LD) (LDH) ENZYME: CPT

## 2020-10-21 PROCEDURE — 94761 N-INVAS EAR/PLS OXIMETRY MLT: CPT

## 2020-10-21 PROCEDURE — 20600001 HC STEP DOWN PRIVATE ROOM

## 2020-10-21 PROCEDURE — 87040 BLOOD CULTURE FOR BACTERIA: CPT | Mod: 59

## 2020-10-21 PROCEDURE — 87086 URINE CULTURE/COLONY COUNT: CPT

## 2020-10-21 PROCEDURE — 81001 URINALYSIS AUTO W/SCOPE: CPT

## 2020-10-21 PROCEDURE — 86140 C-REACTIVE PROTEIN: CPT

## 2020-10-21 PROCEDURE — 85379 FIBRIN DEGRADATION QUANT: CPT

## 2020-10-21 PROCEDURE — 85025 COMPLETE CBC W/AUTO DIFF WBC: CPT

## 2020-10-21 PROCEDURE — 82728 ASSAY OF FERRITIN: CPT

## 2020-10-21 PROCEDURE — 99232 SBSQ HOSP IP/OBS MODERATE 35: CPT | Mod: ,,, | Performed by: HOSPITALIST

## 2020-10-21 PROCEDURE — 63600175 PHARM REV CODE 636 W HCPCS: Performed by: FAMILY MEDICINE

## 2020-10-21 RX ADMIN — BACLOFEN 10 MG: 10 TABLET ORAL at 09:10

## 2020-10-21 RX ADMIN — LEVETIRACETAM 1088 MG: 100 SOLUTION ORAL at 09:10

## 2020-10-21 RX ADMIN — ONDANSETRON 4 MG: 2 INJECTION INTRAMUSCULAR; INTRAVENOUS at 12:10

## 2020-10-21 RX ADMIN — ENOXAPARIN SODIUM 40 MG: 40 INJECTION SUBCUTANEOUS at 09:10

## 2020-10-21 RX ADMIN — BACLOFEN 10 MG: 10 TABLET ORAL at 03:10

## 2020-10-21 RX ADMIN — THERA TABS 1 TABLET: TAB at 08:10

## 2020-10-21 RX ADMIN — MELATONIN TAB 3 MG 3 MG: 3 TAB at 09:10

## 2020-10-21 RX ADMIN — BACLOFEN 10 MG: 10 TABLET ORAL at 08:10

## 2020-10-21 NOTE — PROGRESS NOTES
Hospital Medicine  Progress Note  [facility]      Patient Name: Alexander Sainz  MRN:  [id number]  Hospital Medicine Team: [facility] [place] [place] HOSP MED R Vilma Mattson MD  Date of Admission:  10/11/2020     Length of Stay:  LOS: 10 days       Principal Problem:  Pneumonia due to COVID-19 virus      HPI:  30yoF with severe MR, cerebral palsy, spina bifida, chronic transaminitis, chronic hypernatremia, lives at [facility], who presented to  ED with fever 102 at home, and hypoxemic resp failure with sats in the field 88%.  In ED, temp 99, Tachy 119, O2 sats 82% on RA and improved to 90s on 2-3L.  CXR with fairly dense infiltrate in RML - appears bacterial in nature.  Was Tx-ed for bacterial PNA with Rocephin and Azithro. Covid +  and called for transfer to [facility].  One dose of IV Dexa.     Interval History:     Doing well on room air. No acute events overnight. [facility] does not want to take pt back unless hypothermia resolved and not requiring warming blanket for 24 hours. She is chronically hypothermic with chronic thermoregulation abnormalities, baseline temp about 95. Father states she requires sweatshirts and jogger pants to keep her temperature up even in the summer. Low temperature recording today was also inaccurate due to wrong rectal probe positioning, per RN. Advised nursing to not start heating blanket unless temperature is below her baseline. I've ordered repeat infectious workup today just in case but low suspicion. Requested father bring warm clothing from home. Asked nurse to place multiple blankets and to increase room temp to keep her temp appropriate. All of COVID markers are improving nicely.      Review of Systems:  Unable to obtain, patient non verbal    Inpatient Medications:    Current Facility-Administered Medications:     baclofen tablet 10 mg, 10 mg, Per G Tube, TID, Augie Calvo MD, 10 mg at 10/21/20 0851    dextrose 50% injection 12.5 g, 12.5 g, Intravenous, PRN,  "Augie Calvo MD    dextrose 50% injection 25 g, 25 g, Intravenous, PRN, Augie Calvo MD    diazePAM 5-7.5-10 mg (DIASTAT ACUDIAL) rectal kit 20 mg, 20 mg, Rectal, PRN, Augie Calvo MD, 10 mg at 10/17/20 0110    enoxaparin injection 40 mg, 40 mg, Subcutaneous, Q24H, Augie Calvo MD, 40 mg at 10/20/20 2008    glucagon (human recombinant) injection 1 mg, 1 mg, Intramuscular, PRN, Augie Calvo MD    lactulose 10 gram/15 mL solution (enema) 200 g, 200 g, Rectal, BID PRN, Augie Calvo MD    levetiracetam oral soln Soln 1,088 mg, 20 mg/kg, Per G Tube, BID, Augie Calvo MD, 1,088 mg at 10/20/20 2008    lidocaine (PF) 10 mg/ml (1%) injection 100 mg, 10 mL, Other, Once, Lukasz Junior MD    melatonin tablet 3 mg, 3 mg, Per G Tube, Nightly PRN, Augie Calvo MD, 3 mg at 10/20/20 2008    multivitamin tablet, 1 tablet, Oral, Daily, Lokesh Mishra MD, 1 tablet at 10/21/20 0851    ondansetron injection 4 mg, 4 mg, Intravenous, Q6H PRN, Vilma Mattson MD, 4 mg at 10/21/20 0015    sodium chloride 0.9% flush 10 mL, 10 mL, Intravenous, PRN, Augie Calvo MD      Physical Exam:      Intake/Output Summary (Last 24 hours) at 10/21/2020 1452  Last data filed at 10/21/2020 0930  Gross per 24 hour   Intake 1380 ml   Output --   Net 1380 ml     Wt Readings from Last 3 Encounters:   10/16/20 53.6 kg (118 lb 2.7 oz)   10/11/20 54.4 kg (119 lb 14.9 oz)   05/28/19 54.4 kg (120 lb)       /69 (BP Location: Right arm)   Pulse 66   Temp 96.7 °F (35.9 °C) (Axillary)   Resp 20   Ht 5' 1" (1.549 m)   Wt 53.6 kg (118 lb 2.7 oz)   LMP  (LMP Unknown)   SpO2 (!) 94%   Breastfeeding No   BMI 22.33 kg/m²     GEN: NAD, opens eyes to sternal rub  Resp: CTA, no wheezes or rales, normal work of breathing, comfortable on room air  CV: RRR, no m/r/g, no edema  GI: soft, NTND, PEG w/ abdominal binder  Skin: no rash  Neuro: PERRL    Laboratory:  Lab Results   Component Value Date    [id number] " Detected (A) 10/18/2020       Recent Labs   Lab 10/18/20  0146 10/20/20  0205 10/21/20  1053   WBC 4.78 3.99 4.81   LYMPH 35.1  1.7 36.6  1.5 43.9  2.1   HGB 11.4* 11.2* 11.3*   HCT 36.1* 35.3* 35.6*    315 332     Recent Labs   Lab 10/15/20  0516 10/16/20  0246 10/18/20  0145 10/20/20  0205 10/21/20  1054   * 146* 146* 145 144   K 3.9 3.7 4.0 4.0 3.7    109 107 106 106   CO2 29 26 27 30* 32*   BUN 15 15 19 19 16   CREATININE 0.5 0.5 0.6 0.6 0.6   GLU 72 121* 82 111* 113*   CALCIUM 8.7 9.2 9.9 9.5 9.2   MG 1.7 1.8  --   --   --    PHOS 3.6 3.3  --   --   --      Recent Labs   Lab 10/18/20  0145 10/20/20  0205 10/21/20  1054   ALKPHOS 213* 196* 170*   ALT 60* 46* 35   AST 30 18 15   ALBUMIN 2.7* 2.8* 2.8*   PROT 7.8 7.9 7.4   BILITOT 0.3 0.3 0.2        Recent Labs     10/20/20  0205 10/21/20  1053 10/21/20  1054   DDIMER 0.64*  --  0.48   FERRITIN 296  --  281   CRP 6.4  --  2.5   * 265*  --        All labs within the last 24 hours were reviewed.     Microbiology:  Microbiology Results (last 7 days)     Procedure Component Value Units Date/Time    Blood culture [[id number]] Collected: 10/21/20 1053    Order Status: Sent Specimen: Blood Updated: 10/21/20 1202    Blood culture [[id number]] Collected: 10/21/20 1053    Order Status: Sent Specimen: Blood Updated: 10/21/20 1202    Blood culture [[id number]] Collected: 10/13/20 1103    Order Status: Completed Specimen: Blood from Peripheral, Site Unknown Updated: 10/18/20 1412     Blood Culture, Routine No growth after 5 days.    Blood culture [[id number]] Collected: 10/13/20 1115    Order Status: Completed Specimen: Blood Updated: 10/18/20 1412     Blood Culture, Routine No growth after 5 days.    Blood culture [[id number]] Collected: 10/12/20 0958    Order Status: Completed Specimen: Blood from Peripheral, Right Hand Updated: 10/17/20 1412     Blood Culture, Routine No growth after 5 days.    Blood culture [[id number]] Collected: 10/12/20 0952     Order Status: Completed Specimen: Blood from Peripheral, Right Hand Updated: 10/17/20 1412     Blood Culture, Routine No growth after 5 days.    Blood culture x two cultures. Draw prior to antibiotics. [[id number]] Collected: 10/11/20 0815    Order Status: Completed Specimen: Blood from Peripheral, Antecubital, Right Updated: 10/16/20 0903     Blood Culture, Routine No growth after 5 days.    Narrative:      Aerobic and anaerobic    Blood culture x two cultures. Draw prior to antibiotics. [[id number]]  (Abnormal) Collected: 10/11/20 0735    Order Status: Completed Specimen: Blood from Peripheral, Hand, Left Updated: 10/15/20 0746     Blood Culture, Routine Gram stain aer bottle: Gram positive cocci in clusters resembling Staph       Results called to and read back by: Betzy Wolfe 10/12/2020  08:51      COAGULASE-NEGATIVE STAPHYLOCOCCUS SPECIES  Organism is a probable contaminant      Narrative:      Aerobic and anaerobic            Imaging  ECG Results          EKG 12-lead (Final result)  Result time 10/13/20 19:55:01    Final result by Interface, Lab In [facility] (10/13/20 19:55:01)                 Narrative:    Test Reason : R00.0,    Vent. Rate : 120 BPM     Atrial Rate : 120 BPM     P-R Int : 146 ms          QRS Dur : 086 ms      QT Int : 316 ms       P-R-T Axes : 045 076 -03 degrees     QTc Int : 446 ms    Sinus tachycardia  Possible Left atrial enlargement  Nonspecific T wave abnormality  Abnormal ECG  When compared with ECG of 07-JUN-2015 08:49,  Significant changes have occurred  Confirmed by Magdy Gomez MD ([id number]) on 10/13/2020 7:54:44 PM    Referred By: [email or web address]   SELF           Confirmed By:Magdy Gomez MD                              No results found for this or any previous visit.    CT Head W WO Contrast  Narrative: EXAMINATION:  CT HEAD WITH AND WITHOUT    CLINICAL HISTORY:  Seizure, abnormal neuro exam;    TECHNIQUE:  Low dose axial CT images obtained throughout the head with Omnipaque 350  and without intravenous contrast. Sagittal and coronal reconstructions were performed.  Examination limited by patient positioning and motion.    COMPARISON:  CT maxillofacial 05/20/2019    FINDINGS:  Intracranial compartment:    Marked abnormal appearance of the right cerebral hemisphere.  There is diminutive in caliber less than at the size of its contralateral counterpart.  Trans mantle defect through the right frontal lobe extending from the pial surface to the ependymal surface.  There also appears to be a defect in the anterior inferior left frontal lobe.  Bifrontal lesions adjacent to the a surgical defect, potentially indicative of prior encephalocele.    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   Posterior fossa structures are relatively unremarkable.  No new parenchymal mass, hemorrhage, edema or infarction identified.  No enhancing parenchymal lesions.    There is ventriculomegaly with colpocephaly, left greater than right.  Ventricles grossly stable in size to the prior max face CT of 2019.  No acute hydrocephalus.    No extra-axial blood, fluid collections or enhancing lesions.    Skull/extracranial contents (limited evaluation):    Diminished craniofacial proportion in keeping with microcephaly.  Chronic appearing bony deformity involving the midline frontal bone, possibly related to a remote surgery.  No acute calvarial fracture.    Mastoid air cells and paranasal sinuses are essentially clear.  Impression: Examination mildly degraded by patient motion artifact.    Abnormal appearance of the brain in keeping with a chronic/developmental abnormality as above.  Findings are not appreciable change from prior study 05/20/2019.  No compelling evidence of acute intracranial process.    Clinical correlation advised with further workup as warranted.    Electronically signed by resident: Lobo Lee  Date:    10/20/2020  Time:    14:44    Electronically signed by: John Garcias  MD  Date:    10/20/2020  Time:    15:58      All imaging within the last 24 hours was reviewed.     Assessment and Plan:    Active Hospital Problems    Diagnosis  POA    *Pneumonia due to COVID-19 virus [U07.1, J12.89]  Yes    PEG tube malfunction [K94.23]  No    Sinus tachycardia [R00.0]  Yes    Hypoxia [R09.02]  Yes    Hypernatremia [E87.0]  Yes    Abnormal liver enzymes [R74.8]  Yes    Cerebral palsy [G80.9]  Yes      Resolved Hospital Problems   No resolved problems to display.         COVID-19 Virus Infection  Viral Pneumonia due to COVID-19    -Completed CAP coverage w/ abx  -Completed remdesivir/dexamethasone  - COVID-19 testing:   - Isolation: Airborne/Droplet. Surgical mask on patient. Notify Infection Control  - Diagnostics: Trend Q48hrs if stable, more frequently if patient decompensating     Laboratory/Study Frequency Result   CBC Admit & Q48h    CMP Admit & Q48h LFTs improved   Magnesium level Admit    D-dimer Admit & Q48h 2.1 > 2.16 > 1.2 > 2.3 > 0.6 > 0.48   Ferritin Admit & Q48h 1000 > 1214 > 432 > 362 > 296 > 281   CRP Admit & Q48h 182.8 > 46.3 > 58 > 23 > 6 > 2.5   CPK Admit & Q24h if elevated 95   LDH Admit & Q48h 579 > 426 > 444 > 283 > 265   Vitamin D Admit 54   BNP Admit    Troponin Admit & Q6h if elevated    Glucose-6-phos dehydrogenase Admit    Procalcitonin Admit .62 > negative   Lipid Panel If using statin    Rapid influenza Admit -   Resp Infection Panel Admit if BMT/organ transplant    Legionella Antigen Admit ordered   Sputum Culture Admit ordered   Blood Culture Admit 1/2+   Urinalysis & Culture Admit    ECG Admit & Q48h if on HCQ    CXR Admit    Lymphopenia, hyponatremia, hyperferritinemia, elevated troponin, elevated d-dimer, age, and medical comorbidities are significant predictors of poor clinical outcome    - Management: per [facility] COVID Treatment Protocol (4/15/20)    - Monitoring:   - Telemetry & Continuous Pulse Oximetry    - Nutrition:    - Multivitamin PO daily   - Add  Boost supplement   - Vitamin D 1000IU daily if deficient   - Ascorbic acid 500mg PO bid    - Supportive Care:   - acetaminophen 650mg PO Q6hr PRN fever/headache   - loperamide PRN viral diarrhea   - IVF if indicated, restrictive strategy preferred, no maintenance IV if able   - VTE PPx: enoxaparin or heparin SQ unless contraindicated    - Antibiotics:  - if indications, CXR findings, elevated procal. See protocol for alternatives.   - Discontinue early if low concern for bacterial co-infection   - ceftriaxone 1g Q24h x 5 days  - azithromycin 500mg po x1, then 250mg po daily x 4 days    - Investigational Therapies:   - If patient meets criteria  - inflammatory markers are now WNL - will stop trending    Acute Hypoxemic Respiratory Failure, Resolved  - Order RT consult via Respiratory Communication for COVID Protocols  - Order Incentive Spirometer Q4h  - Order Flutter Valve Q4h  - Continuous Pulse Oximetry  - Goal SpO2 92-96%  - Supplemental O2 via LFNC, VentiMask, or HFNC (see Respiratory Support Oxygen Therapies)  - If wheezing, albuterol INH Q6h scheduled & PRN  - Proning Protocol if patient is a candidate (see  Proning Protocol)   - GCS >13, able to self-prone  - If deterioration, may warrant trial of NIPPV and transfer to [facility] pressure room or immediate ICU consult  - Now on room air     PEG Tube Malfunction  -Pt pulled out PEG 10/18. Discussed w/ Surgery - [place] inserted. Ok to use for tube feeds, etc. Ok to discharge with. Abd binder so patient does not remove in the future.     HyperNA  Resolved     CP   Spastic/sz/developmentally delayed  Nonverbal, spastic  Continue baclofen  No acute issues    Seizure  Had rhythmic eye rolling c/f seizure. Resolved after Valium given.   Discuss w/ Neurology.   Seizure precautions. Continue home keppra  Head CT w/ contrast    Chronic hypothermia  Has chronic thermoregulation issues, per father  Baseline temp about 95  Per father, patient requires sweatshirt & pants to start  warm, even during the summer  Keep room temperature high; maintain temperature with multiple blankets; avoid heating blanket unless temp below baseline; please ensure correct rectal probe placement if low reading.  Repeated infectious workup today but low suspicion. WBC normal. No left shift or bandemia. Procal returned negative. F/U UA and blood cx's.     Diet: Isosource 1.5 at 40ml/hr for 733ml  Code; Full  Ppx: lovenox  Dispo: back to [facility]      VTE High Risk Prophylaxis:   VTE Risk Mitigation (From admission, onward)         Ordered     enoxaparin injection 40 mg  Every 24 hours      10/11/20 1334     IP VTE HIGH RISK PATIENT  Once      10/11/20 1334     Place sequential compression device  Until discontinued      10/11/20 1334              Pt has the following problems which are a threat to life and bodily function, and are unstable: acute resp failure, COVID viral PNA    Vilma Mattson MD  Hospital Medicine Staff  [facility]

## 2020-10-21 NOTE — PLAN OF CARE
Pt resting comfortably overnight, medicated per MAR, vital signs stable overnight. No adverse events at this time, pt remains in mitts to protect tubes and lines. Pt elevated and positioned with wedge to prevent sliding down in bed which may increase risk of emesis and aspiration. WCTM

## 2020-10-21 NOTE — NURSING
Pt resting comfortable at this time, vitals stable. Pt medicated per MAR and given melatonin to promote rest. Pt remains in mitts with rt arm restrained to prevent tubes being self removed. Abd binder in place to help prevent removal of feeding tube. WCTM

## 2020-10-21 NOTE — PLAN OF CARE
Soft wrist restraint to right arm currently active with soft mittens to BUE. Patient father well involved with patient care. Monitoring VS, especially temp. Do not use bare hugger tonight per MD order for expected d/c to another facility 10/22/20

## 2020-10-21 NOTE — TELEPHONE ENCOUNTER
----- Message from Trice Blake sent at 10/20/2020  2:16 PM CDT -----  Regarding: Specimen Issue  There was an issue with the L. Pneumophilia Urinary Ag test ordered on this patient from 10/12/2020    Unfortunately, do to an error at the reference lab, the specimen was unable to be processed. The order has been cancelled and will need to be reordered and recollected.    If there are any questions, please call the Sendout lab at [phone]. [phone].  Anyone in the Sendout lab will be able to assist you.

## 2020-10-21 NOTE — PLAN OF CARE
10/21/20 0950   Post-Acute Status   Post-Acute Authorization Placement     SW received a call from Stefanie, admissions coordinator with [facility] reporting that pt will not be able to admit back there today. She is stating that patient vitals are still too low. CM spoke with coordinator Tha and she states that physician will contact their physician at [facility].        Lanie Pagan LMSW  [facility]   [phone]

## 2020-10-22 LAB — BACTERIA UR CULT: NO GROWTH

## 2020-10-22 PROCEDURE — 25000003 PHARM REV CODE 250: Performed by: FAMILY MEDICINE

## 2020-10-22 PROCEDURE — 99233 SBSQ HOSP IP/OBS HIGH 50: CPT | Mod: ,,, | Performed by: STUDENT IN AN ORGANIZED HEALTH CARE EDUCATION/TRAINING PROGRAM

## 2020-10-22 PROCEDURE — 97803 MED NUTRITION INDIV SUBSEQ: CPT

## 2020-10-22 PROCEDURE — 25000003 PHARM REV CODE 250: Performed by: INTERNAL MEDICINE

## 2020-10-22 PROCEDURE — 99900035 HC TECH TIME PER 15 MIN (STAT)

## 2020-10-22 PROCEDURE — 94761 N-INVAS EAR/PLS OXIMETRY MLT: CPT

## 2020-10-22 PROCEDURE — 63600175 PHARM REV CODE 636 W HCPCS: Performed by: FAMILY MEDICINE

## 2020-10-22 PROCEDURE — 20600001 HC STEP DOWN PRIVATE ROOM

## 2020-10-22 PROCEDURE — 99233 PR SUBSEQUENT HOSPITAL CARE,LEVL III: ICD-10-PCS | Mod: ,,, | Performed by: STUDENT IN AN ORGANIZED HEALTH CARE EDUCATION/TRAINING PROGRAM

## 2020-10-22 RX ADMIN — ENOXAPARIN SODIUM 40 MG: 40 INJECTION SUBCUTANEOUS at 08:10

## 2020-10-22 RX ADMIN — LEVETIRACETAM 1088 MG: 100 SOLUTION ORAL at 09:10

## 2020-10-22 RX ADMIN — BACLOFEN 10 MG: 10 TABLET ORAL at 03:10

## 2020-10-22 RX ADMIN — MELATONIN TAB 3 MG 3 MG: 3 TAB at 08:10

## 2020-10-22 RX ADMIN — BACLOFEN 10 MG: 10 TABLET ORAL at 09:10

## 2020-10-22 RX ADMIN — THERA TABS 1 TABLET: TAB at 09:10

## 2020-10-22 RX ADMIN — BACLOFEN 10 MG: 10 TABLET ORAL at 08:10

## 2020-10-22 NOTE — PROGRESS NOTES
Hospital Medicine  Progress Note  [facility]      Patient Name: Alexander Sainz  MRN:  [id number]  Hospital Medicine Team: [facility] R Mert Lockhart MD  Date of Admission:  10/11/2020     Length of Stay:  LOS: 11 days       Principal Problem:  Pneumonia due to COVID-19 virus      HPI:  30yoF with severe MR, cerebral palsy, spina bifida, chronic transaminitis, chronic hypernatremia, lives at [facility], who presented to  ED with fever 102 at home, and hypoxemic resp failure with sats in the field 88%.  In ED, temp 99, Tachy 119, O2 sats 82% on RA and improved to 90s on 2-3L.  CXR with fairly dense infiltrate in RML - appears bacterial in nature.  Was Tx-ed for bacterial PNA with Rocephin and Azithro. Covid +  and called for transfer to [facility].  One dose of IV Dexa.     [facility] does not want to take pt back unless hypothermia resolved and not requiring warming blanket for 24 hours. She is chronically hypothermic with chronic thermoregulation abnormalities, baseline temp about 95.    Interval History:       Pt normothermic this am. Continues to be hypoxic to low 90s but remains comfortable on RA. SW in discussion with [facility] today. Still waiting for 24 h period of normothermia. Planning for transfer tomorrow. Pt playful this am. Father finds her to be at her baseline.     Review of Systems:  Unable to obtain, patient non verbal    Inpatient Medications:    Current Facility-Administered Medications:     baclofen tablet 10 mg, 10 mg, Per G Tube, TID, Augie Calvo MD, 10 mg at 10/21/20 2110    dextrose 50% injection 12.5 g, 12.5 g, Intravenous, PRN, Augie Calvo MD    dextrose 50% injection 25 g, 25 g, Intravenous, PRN, Augie Calvo MD    diazePAM 5-7.5-10 mg (DIASTAT ACUDIAL) rectal kit 20 mg, 20 mg, Rectal, PRN, Augie Calvo MD, 10 mg at 10/17/20 0110    enoxaparin injection 40 mg, 40 mg, Subcutaneous, Q24H, Augie Calvo MD, 40 mg at 10/21/20 2110    " glucagon (human recombinant) injection 1 mg, 1 mg, Intramuscular, PRN, Augie Calvo MD    lactulose 10 gram/15 mL solution (enema) 200 g, 200 g, Rectal, BID PRN, Augie Calvo MD    levetiracetam oral soln Soln 1,088 mg, 20 mg/kg, Per G Tube, BID, Augie Calvo MD, 1,088 mg at 10/21/20 2110    lidocaine (PF) 10 mg/ml (1%) injection 100 mg, 10 mL, Other, Once, Lukasz Junior MD    melatonin tablet 3 mg, 3 mg, Per G Tube, Nightly PRN, Augie Calvo MD, 3 mg at 10/21/20 2110    multivitamin tablet, 1 tablet, Oral, Daily, Lokesh Mishra MD, 1 tablet at 10/21/20 0851    ondansetron injection 4 mg, 4 mg, Intravenous, Q6H PRN, Vilma Mattson MD, 4 mg at 10/21/20 0015    sodium chloride 0.9% flush 10 mL, 10 mL, Intravenous, PRN, Augie Calvo MD      Physical Exam:      Intake/Output Summary (Last 24 hours) at 10/22/2020 0832  Last data filed at 10/22/2020 0600  Gross per 24 hour   Intake 945 ml   Output --   Net 945 ml     Wt Readings from Last 3 Encounters:   10/16/20 53.6 kg (118 lb 2.7 oz)   10/11/20 54.4 kg (119 lb 14.9 oz)   05/28/19 54.4 kg (120 lb)       /75 (BP Location: Right arm, Patient Position: Lying)   Pulse 68   Temp 98.7 °F (37.1 °C) (Axillary)   Resp 16   Ht 5' 1" (1.549 m)   Wt 53.6 kg (118 lb 2.7 oz)   LMP  (LMP Unknown)   SpO2 (!) 90%   Breastfeeding No   BMI 22.33 kg/m²     GEN: NAD, opens eyes to sternal rub  Resp: CTA, no wheezes or rales, normal work of breathing, comfortable on room air  CV: RRR, no m/r/g, no edema  GI: soft, NTND, PEG w/ abdominal binder  Skin: no rash  Neuro: PERRL    Laboratory:  Lab Results   Component Value Date    [id number] Detected (A) 10/18/2020       Recent Labs   Lab 10/18/20  0146 10/20/20  0205 10/21/20  1053   WBC 4.78 3.99 4.81   LYMPH 35.1  1.7 36.6  1.5 43.9  2.1   HGB 11.4* 11.2* 11.3*   HCT 36.1* 35.3* 35.6*    315 332     Recent Labs   Lab 10/16/20  0246 10/18/20  0145 10/20/20  0205 10/21/20  1054   NA " 146* 146* 145 144   K 3.7 4.0 4.0 3.7    107 106 106   CO2 26 27 30* 32*   BUN 15 19 19 16   CREATININE 0.5 0.6 0.6 0.6   * 82 111* 113*   CALCIUM 9.2 9.9 9.5 9.2   MG 1.8  --   --   --    PHOS 3.3  --   --   --      Recent Labs   Lab 10/18/20  0145 10/20/20  0205 10/21/20  1054   ALKPHOS 213* 196* 170*   ALT 60* 46* 35   AST 30 18 15   ALBUMIN 2.7* 2.8* 2.8*   PROT 7.8 7.9 7.4   BILITOT 0.3 0.3 0.2        Recent Labs     10/20/20  0205 10/21/20  1053 10/21/20  1054   DDIMER 0.64*  --  0.48   FERRITIN 296  --  281   CRP 6.4  --  2.5   * 265*  --        All labs within the last 24 hours were reviewed.     Microbiology:  Microbiology Results (last 7 days)     Procedure Component Value Units Date/Time    Blood culture [[id number]] Collected: 10/21/20 1053    Order Status: Completed Specimen: Blood Updated: 10/21/20 2115     Blood Culture, Routine No Growth to date    Blood culture [[id number]] Collected: 10/21/20 1053    Order Status: Completed Specimen: Blood Updated: 10/21/20 2115     Blood Culture, Routine No Growth to date    Urine culture [[id number]] Collected: 10/21/20 1620    Order Status: No result Specimen: Urine Updated: 10/21/20 1836    Blood culture [[id number]] Collected: 10/13/20 1103    Order Status: Completed Specimen: Blood from Peripheral, Site Unknown Updated: 10/18/20 1412     Blood Culture, Routine No growth after 5 days.    Blood culture [[id number]] Collected: 10/13/20 1115    Order Status: Completed Specimen: Blood Updated: 10/18/20 1412     Blood Culture, Routine No growth after 5 days.    Blood culture [[id number]] Collected: 10/12/20 0958    Order Status: Completed Specimen: Blood from Peripheral, Right Hand Updated: 10/17/20 1412     Blood Culture, Routine No growth after 5 days.    Blood culture [[id number]] Collected: 10/12/20 0952    Order Status: Completed Specimen: Blood from Peripheral, Right Hand Updated: 10/17/20 1412     Blood Culture, Routine No growth after 5 days.     Blood culture x two cultures. Draw prior to antibiotics. [[id number]] Collected: 10/11/20 0815    Order Status: Completed Specimen: Blood from Peripheral, Antecubital, Right Updated: 10/16/20 0903     Blood Culture, Routine No growth after 5 days.    Narrative:      Aerobic and anaerobic            Imaging  ECG Results          EKG 12-lead (Final result)  Result time 10/13/20 19:55:01    Final result by Interface, Lab In [facility] (10/13/20 19:55:01)                 Narrative:    Test Reason : R00.0,    Vent. Rate : 120 BPM     Atrial Rate : 120 BPM     P-R Int : 146 ms          QRS Dur : 086 ms      QT Int : 316 ms       P-R-T Axes : 045 076 -03 degrees     QTc Int : 446 ms    Sinus tachycardia  Possible Left atrial enlargement  Nonspecific T wave abnormality  Abnormal ECG  When compared with ECG of 07-JUN-2015 08:49,  Significant changes have occurred  Confirmed by Magdy Gomez MD ([id number]) on 10/13/2020 7:54:44 PM    Referred By: [email or web address]   SELF           Confirmed By:Magdy Gomez MD                              No results found for this or any previous visit.    CT Head W WO Contrast  Narrative: EXAMINATION:  CT HEAD WITH AND WITHOUT    CLINICAL HISTORY:  Seizure, abnormal neuro exam;    TECHNIQUE:  Low dose axial CT images obtained throughout the head with Omnipaque 350 and without intravenous contrast. Sagittal and coronal reconstructions were performed.  Examination limited by patient positioning and motion.    COMPARISON:  CT maxillofacial 05/20/2019    FINDINGS:  Intracranial compartment:    Marked abnormal appearance of the right cerebral hemisphere.  There is diminutive in caliber less than at the size of its contralateral counterpart.  Trans mantle defect through the right frontal lobe extending from the pial surface to the ependymal surface.  There also appears to be a defect in the anterior inferior left frontal lobe.  Bifrontal lesions adjacent to the a surgical defect, potentially indicative of  prior encephalocele.    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   Posterior fossa structures are relatively unremarkable.  No new parenchymal mass, hemorrhage, edema or infarction identified.  No enhancing parenchymal lesions.    There is ventriculomegaly with colpocephaly, left greater than right.  Ventricles grossly stable in size to the prior max face CT of 2019.  No acute hydrocephalus.    No extra-axial blood, fluid collections or enhancing lesions.    Skull/extracranial contents (limited evaluation):    Diminished craniofacial proportion in keeping with microcephaly.  Chronic appearing bony deformity involving the midline frontal bone, possibly related to a remote surgery.  No acute calvarial fracture.    Mastoid air cells and paranasal sinuses are essentially clear.  Impression: Examination mildly degraded by patient motion artifact.    Abnormal appearance of the brain in keeping with a chronic/developmental abnormality as above.  Findings are not appreciable change from prior study 05/20/2019.  No compelling evidence of acute intracranial process.    Clinical correlation advised with further workup as warranted.    Electronically signed by resident: Lobo Lee  Date:    10/20/2020  Time:    14:44    Electronically signed by: John Garcias MD  Date:    10/20/2020  Time:    15:58      All imaging within the last 24 hours was reviewed.     Assessment and Plan:    Active Hospital Problems    Diagnosis  POA    *Pneumonia due to COVID-19 virus [U07.1, J12.89]  Yes    PEG tube malfunction [K94.23]  No    Sinus tachycardia [R00.0]  Yes    Hypoxia [R09.02]  Yes    Hypernatremia [E87.0]  Yes    Abnormal liver enzymes [R74.8]  Yes    Cerebral palsy [G80.9]  Yes      Resolved Hospital Problems   No resolved problems to display.         COVID-19 Virus Infection  Viral Pneumonia due to COVID-19    -Completed CAP coverage w/ abx  -Completed remdesivir/dexamethasone  - COVID-19 testing:   - Isolation: Airborne/Droplet.  Surgical mask on patient. Notify Infection Control  - Diagnostics: Trend Q48hrs if stable, more frequently if patient decompensating     Laboratory/Study Frequency Result   CBC Admit & Q48h    CMP Admit & Q48h LFTs improved   Magnesium level Admit    D-dimer Admit & Q48h 2.1 > 2.16 > 1.2 > 2.3 > 0.6 > 0.48   Ferritin Admit & Q48h 1000 > 1214 > 432 > 362 > 296 > 281   CRP Admit & Q48h 182.8 > 46.3 > 58 > 23 > 6 > 2.5   CPK Admit & Q24h if elevated 95   LDH Admit & Q48h 579 > 426 > 444 > 283 > 265   Vitamin D Admit 54   BNP Admit    Troponin Admit & Q6h if elevated    Glucose-6-phos dehydrogenase Admit    Procalcitonin Admit .62 > negative   Lipid Panel If using statin    Rapid influenza Admit -   Resp Infection Panel Admit if BMT/organ transplant    Legionella Antigen Admit ordered   Sputum Culture Admit ordered   Blood Culture Admit 1/2+   Urinalysis & Culture Admit    ECG Admit & Q48h if on HCQ    CXR Admit    Lymphopenia, hyponatremia, hyperferritinemia, elevated troponin, elevated d-dimer, age, and medical comorbidities are significant predictors of poor clinical outcome    - Management: per [facility] COVID Treatment Protocol (4/15/20)    - Monitoring:   - Telemetry & Continuous Pulse Oximetry    - Nutrition:    - Multivitamin PO daily   - Add Boost supplement    - Supportive Care:   - acetaminophen 650mg PO Q6hr PRN fever/headache   - loperamide PRN viral diarrhea   - IVF if indicated, restrictive strategy preferred, no maintenance IV if able   - VTE PPx: enoxaparin or heparin SQ unless contraindicated    - Antibiotics:  - if indications, CXR findings, elevated procal. See protocol for alternatives.   - Discontinue early if low concern for bacterial co-infection   - s/p ceftriaxone and aithromycin    - Investigational Therapies:   - s/p remdesivir  - inflammatory markers are now WNL - will stop trending    Acute Hypoxemic Respiratory Failure, Resolved  - Order RT consult via Respiratory Communication for COVID  Protocols  - Order Incentive Spirometer Q4h  - Order Flutter Valve Q4h  - Continuous Pulse Oximetry  - Goal SpO2 92-96%  - Supplemental O2 via LFNC, VentiMask, or HFNC (see Respiratory Support Oxygen Therapies)  - If wheezing, albuterol INH Q6h scheduled & PRN  - Proning Protocol if patient is a candidate (see  Proning Protocol)   - GCS >13, able to self-prone  - If deterioration, may warrant trial of NIPPV and transfer to [facility] pressure room or immediate ICU consult  - Now on room air     PEG Tube Malfunction  -Pt pulled out PEG 10/18. Discussed w/ Surgery - [place] inserted. Ok to use for tube feeds, etc. Ok to discharge with. Abd binder so patient does not remove in the future.     HyperNA  Resolved     CP   Spastic/sz/developmentally delayed  Nonverbal, spastic  Continue baclofen  No acute issues    Seizure  Had rhythmic eye rolling c/f seizure. Resolved after Valium given.   Discuss w/ Neurology.   Seizure precautions. Continue home keppra  Head CT w/ contrast    Chronic hypothermia  Has chronic thermoregulation issues, per father  Baseline temp about 95  Per father, patient requires sweatshirt & pants to start warm, even during the summer  Keep room temperature high; maintain temperature with multiple blankets; avoid heating blanket unless temp below baseline; please ensure correct rectal probe placement if low reading.  Repeated infectious workup 10/21. WBC normal. No left shift or bandemia. Procal returned negative. UA positive for leukocytes in the context of numerous RBCs. Will f/u culture. Blood culture NGTD.     Diet: Isosource 1.5 at 40ml/hr for 733ml  Code; Full  Ppx: lovenox  Dispo: back to [facility]      VTE High Risk Prophylaxis:   VTE Risk Mitigation (From admission, onward)         Ordered     enoxaparin injection 40 mg  Every 24 hours      10/11/20 1334     IP VTE HIGH RISK PATIENT  Once      10/11/20 1334     Place sequential compression device  Until discontinued      10/11/20 1334                   Mert Lockhart MD  [facility] Medicine Staff  [facility]

## 2020-10-22 NOTE — PLAN OF CARE
10/22/20 1309   Post-Acute Status   Post-Acute Authorization Placement     SW left a voicemail for patient father Renard awaiting a call back. SW also attempted to contact [facility] 3 times but did not get an answer. Physician Srinivasan spoke to director yesterday 10/21 and they were refusing to accept patient back due to requiring warm blankets to maintain temp due to hyperthermia. HARRIETT will continue to follow up with the medical team.      Lanie Pagan LMSW  [facility]   [phone]

## 2020-10-22 NOTE — PROGRESS NOTES
"  [facility] - ICU 15 WT  Adult Nutrition  Consult Note    SUMMARY     Recommendations    1. Continue current TF regimen of Isosource 1.5 @ 40 mL/hr - meeting needs.               - If bolus TFs desired, rec'd Isosource 1.5 - 4 cartons/day to provide 1500 kcals, 68 g of protein, 764 mL fluid.  2. RD to monitor & follow-up.    Goals: Meet % EEN, EPN by RD f/u date  Nutrition Goal Status: goal met  Communication of RD Recs: reviewed with RN    Reason for Assessment    Reason For Assessment: RD follow-up  Diagnosis: other (see comments)(PNA 2/2 COVID-19)  Relevant Medical History: MR, CP, SB, Peg placement  Interdisciplinary Rounds: did not attend    General Information Comments: RD working remotely, pt +COVID-19. Pt remains NPO, tolerating enteral nutrition at goal via PEG. Pt presents from group home. Unsure of TF regimen PTA (pt w/ PEG), # x 5 years - per chart review. NFPE not complete 2/2 +COVID-19, however pt likely doesn't meet malnutrition criteria 2/2 stable weight > 5 years.  Nutrition Discharge Planning: Tolerance of TF    Nutrition/Diet History    Patient Reported Diet/Restrictions/Preferences: no oral intake(Per H & P)  Factors Affecting Nutritional Intake: NPO  Nutrition Support Formula Prior to Admit: Other (see comments)(PANCHITO)    Anthropometrics    Temp: 97.9 °F (36.6 °C)  Height Method: Estimated  Height: 5' 1" (154.9 cm)  Height (inches): 61 in  Weight Method: Bed Scale  Weight: 53.6 kg (118 lb 2.7 oz)  Weight (lb): 118.17 lb  Ideal Body Weight (IBW), Female: 105 lb  % Ideal Body Weight, Female (lb): 112.54 %  BMI (Calculated): 22.3  BMI Grade: 18.5-24.9 - normal  Usual Body Weight (UBW), kg: (120#, per chart review x 5 years.)    Lab/Procedures/Meds    Pertinent Labs Reviewed: reviewed  Pertinent Labs Comments: -  Pertinent Medications Reviewed: reviewed  Pertinent Medications Comments: -    Estimated/Assessed Needs    Weight Used For Calorie Calculations: 53.6 kg (118 lb 2.7 " oz)     Energy Calorie Requirements (kcal): 1489 kcal/d  Energy Need Method: [place]-St Jeor(1.25 PAL)     Protein Requirements: 65 g/d (1.2 g/kg)  Weight Used For Protein Calculations: 54.4 kg (119 lb 14.9 oz)     Estimated Fluid Requirement Method: other (see comments)(Per MD or 1 mL/kcal)  RDA Method (mL): 1489    Nutrition Prescription Ordered    Current Diet Order: NPO  Current Nutrition Support Formula Ordered: Isosource 1.5  Current Nutrition Support Rate Ordered: 40 mL/hr    Evaluation of Received Nutrient/Fluid Intake    Enteral Calories (kcal): 1440  Enteral Protein (gm): 65  Enteral (Free Water) Fluid (mL): 733  Free Water Flush Fluid (mL): 700    % Kcal Needs: 97%  % Protein Needs: 100%    Energy Calories Required: meeting needs  Protein Required: meeting needs  Fluid Required: other (see comments)(Per MD or 1 mL/kcal)    Comments: LBM: 10/21    Tolerance: tolerating    Nutrition Risk    Level of Risk/Frequency of Follow-up: (1x/week)     Assessment and Plan    Nutrition Problem  Inadequate energy intake     Related to (etiology):   Inability to consume sufficient energy     Signs and Symptoms (as evidenced by):   NPO     Interventions(treatment strategy):  Collaboration of nutrition care w/ other providers  Enteral nutrition      Nutrition Diagnosis Status:   Continues     Monitor and Evaluation    Food and Nutrient Intake: enteral nutrition intake  Food and Nutrient Adminstration: enteral and parenteral nutrition administration  Physical Activity and Function: nutrition-related ADLs and IADLs  Anthropometric Measurements: weight, weight change  Biochemical Data, Medical Tests and Procedures: inflammatory profile, lipid profile, glucose/endocrine profile, gastrointestinal profile, electrolyte and renal panel  Nutrition-Focused Physical Findings: overall appearance     Nutrition Follow-Up    RD Follow-up?: Yes

## 2020-10-22 NOTE — NURSING
Pt resting in bed, continues to be agitated. Soft restrain to right arm and BUE mitts in use, vitals stable overnight. Pt temp stable at this time, warming with blankets, Bear hugger D/C for transfer tomorrow. WCTM

## 2020-10-22 NOTE — PLAN OF CARE
10/22/20 0909   Post-Acute Status   Post-Acute Authorization Placement       Patient is in need of placement post discharge. Physician reports that she would but repeat an infectious workup just in case. SW awaiting the results and will contact [facility] to see if they are willing to accept patient back for placement.      Lanie Pagan LMSW  [facility]   [phone]

## 2020-10-22 NOTE — PLAN OF CARE
Pt resting in bed, calm and quiet through night, periods of alert and anxious, vitals stable through night, Temperature regular, No events overnight. Pt to be D/C to facility today. DYLAN

## 2020-10-23 VITALS
TEMPERATURE: 98 F | DIASTOLIC BLOOD PRESSURE: 72 MMHG | HEART RATE: 68 BPM | SYSTOLIC BLOOD PRESSURE: 124 MMHG | HEIGHT: 61 IN | RESPIRATION RATE: 18 BRPM | BODY MASS INDEX: 22.31 KG/M2 | WEIGHT: 118.19 LBS | OXYGEN SATURATION: 98 %

## 2020-10-23 DIAGNOSIS — U07.1 COVID-19 VIRUS DETECTED: ICD-10-CM

## 2020-10-23 PROBLEM — J12.82 PNEUMONIA DUE TO COVID-19 VIRUS: Status: RESOLVED | Noted: 2020-10-11 | Resolved: 2020-10-23

## 2020-10-23 PROBLEM — E87.0 HYPERNATREMIA: Status: RESOLVED | Noted: 2020-10-11 | Resolved: 2020-10-23

## 2020-10-23 PROBLEM — R00.0 SINUS TACHYCARDIA: Status: RESOLVED | Noted: 2020-10-11 | Resolved: 2020-10-23

## 2020-10-23 PROBLEM — R09.02 HYPOXIA: Status: RESOLVED | Noted: 2020-10-11 | Resolved: 2020-10-23

## 2020-10-23 LAB
ALBUMIN SERPL BCP-MCNC: 2.9 G/DL (ref 3.5–5.2)
ALP SERPL-CCNC: 154 U/L (ref 55–135)
ALT SERPL W/O P-5'-P-CCNC: 25 U/L (ref 10–44)
ANION GAP SERPL CALC-SCNC: 8 MMOL/L (ref 8–16)
AST SERPL-CCNC: 19 U/L (ref 10–40)
BASOPHILS # BLD AUTO: 0.02 K/UL (ref 0–0.2)
BASOPHILS NFR BLD: 0.4 % (ref 0–1.9)
BILIRUB SERPL-MCNC: 0.2 MG/DL (ref 0.1–1)
BUN SERPL-MCNC: 16 MG/DL (ref 6–20)
CALCIUM SERPL-MCNC: 9.3 MG/DL (ref 8.7–10.5)
CHLORIDE SERPL-SCNC: 111 MMOL/L (ref 95–110)
CO2 SERPL-SCNC: 27 MMOL/L (ref 23–29)
CREAT SERPL-MCNC: 0.6 MG/DL (ref 0.5–1.4)
DIFFERENTIAL METHOD: ABNORMAL
EOSINOPHIL # BLD AUTO: 0.1 K/UL (ref 0–0.5)
EOSINOPHIL NFR BLD: 1.3 % (ref 0–8)
ERYTHROCYTE [DISTWIDTH] IN BLOOD BY AUTOMATED COUNT: 13.5 % (ref 11.5–14.5)
EST. GFR  (AFRICAN AMERICAN): >60 ML/MIN/1.73 M^2
EST. GFR  (NON AFRICAN AMERICAN): >60 ML/MIN/1.73 M^2
GLUCOSE SERPL-MCNC: 120 MG/DL (ref 70–110)
HCT VFR BLD AUTO: 36.5 % (ref 37–48.5)
HGB BLD-MCNC: 11.5 G/DL (ref 12–16)
IMM GRANULOCYTES # BLD AUTO: 0.01 K/UL (ref 0–0.04)
IMM GRANULOCYTES NFR BLD AUTO: 0.2 % (ref 0–0.5)
LYMPHOCYTES # BLD AUTO: 1.6 K/UL (ref 1–4.8)
LYMPHOCYTES NFR BLD: 34.8 % (ref 18–48)
MCH RBC QN AUTO: 29.2 PG (ref 27–31)
MCHC RBC AUTO-ENTMCNC: 31.5 G/DL (ref 32–36)
MCV RBC AUTO: 93 FL (ref 82–98)
MONOCYTES # BLD AUTO: 0.4 K/UL (ref 0.3–1)
MONOCYTES NFR BLD: 9.4 % (ref 4–15)
NEUTROPHILS # BLD AUTO: 2.4 K/UL (ref 1.8–7.7)
NEUTROPHILS NFR BLD: 53.9 % (ref 38–73)
NRBC BLD-RTO: 0 /100 WBC
PLATELET # BLD AUTO: 319 K/UL (ref 150–350)
PMV BLD AUTO: 11 FL (ref 9.2–12.9)
POTASSIUM SERPL-SCNC: 3.9 MMOL/L (ref 3.5–5.1)
PROT SERPL-MCNC: 7.3 G/DL (ref 6–8.4)
RBC # BLD AUTO: 3.94 M/UL (ref 4–5.4)
SARS-COV-2 RNA RESP QL NAA+PROBE: DETECTED
SODIUM SERPL-SCNC: 146 MMOL/L (ref 136–145)
WBC # BLD AUTO: 4.46 K/UL (ref 3.9–12.7)

## 2020-10-23 PROCEDURE — 94761 N-INVAS EAR/PLS OXIMETRY MLT: CPT

## 2020-10-23 PROCEDURE — 80053 COMPREHEN METABOLIC PANEL: CPT

## 2020-10-23 PROCEDURE — 99239 HOSP IP/OBS DSCHRG MGMT >30: CPT | Mod: ,,, | Performed by: STUDENT IN AN ORGANIZED HEALTH CARE EDUCATION/TRAINING PROGRAM

## 2020-10-23 PROCEDURE — 36415 COLL VENOUS BLD VENIPUNCTURE: CPT

## 2020-10-23 PROCEDURE — 25000003 PHARM REV CODE 250: Performed by: FAMILY MEDICINE

## 2020-10-23 PROCEDURE — 25000003 PHARM REV CODE 250: Performed by: INTERNAL MEDICINE

## 2020-10-23 PROCEDURE — 85025 COMPLETE CBC W/AUTO DIFF WBC: CPT

## 2020-10-23 PROCEDURE — 99239 PR HOSPITAL DISCHARGE DAY,>30 MIN: ICD-10-PCS | Mod: ,,, | Performed by: STUDENT IN AN ORGANIZED HEALTH CARE EDUCATION/TRAINING PROGRAM

## 2020-10-23 PROCEDURE — U0003 INFECTIOUS AGENT DETECTION BY NUCLEIC ACID (DNA OR RNA); SEVERE ACUTE RESPIRATORY SYNDROME CORONAVIRUS 2 (SARS-COV-2) (CORONAVIRUS DISEASE [COVID-19]), AMPLIFIED PROBE TECHNIQUE, MAKING USE OF HIGH THROUGHPUT TECHNOLOGIES AS DESCRIBED BY CMS-2020-01-R: HCPCS

## 2020-10-23 RX ADMIN — LEVETIRACETAM 1088 MG: 100 SOLUTION ORAL at 09:10

## 2020-10-23 RX ADMIN — BACLOFEN 10 MG: 10 TABLET ORAL at 09:10

## 2020-10-23 RX ADMIN — THERA TABS 1 TABLET: TAB at 09:10

## 2020-10-23 NOTE — PLAN OF CARE
Problem: Fall Injury Risk  Goal: Absence of Fall and Fall-Related Injury  Outcome: Ongoing, Progressing     Goal: Personal Dignity and Safety Maintained  Outcome: Ongoing, Progressing     Problem: Restraint, Nonbehavioral (Nonviolent)  Goal: Personal Dignity and Safety Maintained  Outcome: Ongoing, Progressing

## 2020-10-23 NOTE — DISCHARGE SUMMARY
[facility]  Discharge Summary  Hospital Medicine    Patient Name: Alexander Sainz  YOB: 1990    Admit Date: 10/11/2020    Discharge Date and Time: No discharge date for patient encounter.    Discharge Attending Physician: Mert Lockhart MD     Team: [facility]    Reason for Admission:   Chief Complaint   Patient presents with    COVID-19 Concerns     pt diagnosed with covid friday; special needs lives at [facility] - staff report O2 sat at 88% on RA - refuses to wear oxygen and temp of 102 prior to tylenol at 0530. received tylenol at 0600 - temp now 99       Active Hospital Problems    Diagnosis  POA    PEG tube malfunction [K94.23]  No    Cerebral palsy [G80.9]  Yes      Resolved Hospital Problems    Diagnosis Date Resolved POA    *Pneumonia due to COVID-19 virus [U07.1, J12.89] 10/23/2020 Yes    Sinus tachycardia [R00.0] 10/23/2020 Yes    Hypoxia [R09.02] 10/23/2020 Yes    Hypernatremia [E87.0] 10/23/2020 Yes    Abnormal liver enzymes [R74.8] 10/23/2020 Yes       HPI:   30yoF with severe MR, cerebral palsy, spina bifida, chronic transaminitis, chronic hypernatremia, lives at [facility], who presented to  ED with fever 102 at home, and hypoxemic resp failure with sats in the field 88%.  In ED, temp 99, Tachy 119, O2 sats 82% on RA and improved to 90s on 2-3L.  CXR with fairly dense infiltrate in RML - appears bacterial in nature.  Was Tx-ed for bacterial PNA with Rocephin and Azithro. Covid +  and called for transfer to [facility].  One dose of IV Dexa.      Hospital Course:     Pt completed 5 day course of azithromycin and ceftriaxone, 5 day course of remdesivir, 10 day course of dexamethasone. Able to be weaned from O2 by 10/21. Pt self-removed PEG tube during stay. Cam[place] catheter placed for use as gastrostomy tube and binder placed to protect abdomen. Pt has been successfully receiving tube feeds and medications via this catheter. Pt developed hypothermia on 10/21.  Placed on heating blanket which was able to be stopped by the evening of 10/21. Hypothermia resolved and not requiring warming blanket for 24 > hours. Will need follow up with PCP and Gneral surgery for re-evaluation of PEG site. Stat SARS COV2 screen performed at time of discharge.         Principal Problem: Pneumonia due to COVID-19 virus      Other Problems Addressed:  Hypoxic respiratory failure, Chronic temperature dysregulation, PEG tube malfunction    Procedures Performed: * No surgery found *    Special Care, Treatment, and Services Provided: none    Consults: General Surgery    Significant Diagnostic Studies:  No results found for: EF  Hemoglobin A1C   Date Value Ref Range Status   10/12/2020 5.3 4.0 - 5.6 % Final     Comment:     ADA Screening Guidelines:  5.7-6.4%  Consistent with prediabetes  >or=6.5%  Consistent with diabetes  High levels of fetal hemoglobin interfere with the HbA1C  assay. Heterozygous hemoglobin variants (HbS, HgC, etc)do  not significantly interfere with this assay.   However, presence of multiple variants may affect accuracy.     06/06/2015 5.1 4.5 - 6.2 % Final     CBC:   Recent Labs   Lab 10/23/20  1024   WBC 4.46   RBC 3.94*   HGB 11.5*   HCT 36.5*      MCV 93   MCH 29.2   MCHC 31.5*     CMP:   Recent Labs   Lab 10/23/20  1024   *   CALCIUM 9.3   ALBUMIN 2.9*   PROT 7.3   *   K 3.9   CO2 27   *   BUN 16   CREATININE 0.6   ALKPHOS 154*   ALT 25   AST 19   BILITOT 0.2     Coagulation: No results for input(s): LABPROT, INR, APTT in the last 168 hours.  Microbiology Results (last 7 days)     Procedure Component Value Units Date/Time    Urine culture [[id number]] Collected: 10/21/20 1620    Order Status: Completed Specimen: Urine Updated: 10/22/20 2136     Urine Culture, Routine No growth    Narrative:      Specimen Source->Urine    Blood culture [[id number]] Collected: 10/21/20 1053    Order Status: Completed Specimen: Blood Updated: 10/22/20 1412     Blood  "Culture, Routine No Growth to date      No Growth to date    Blood culture [[id number]] Collected: 10/21/20 1053    Order Status: Completed Specimen: Blood Updated: 10/22/20 1412     Blood Culture, Routine No Growth to date      No Growth to date    Blood culture [[id number]] Collected: 10/13/20 1103    Order Status: Completed Specimen: Blood from Peripheral, Site Unknown Updated: 10/18/20 1412     Blood Culture, Routine No growth after 5 days.    Blood culture [[id number]] Collected: 10/13/20 1115    Order Status: Completed Specimen: Blood Updated: 10/18/20 1412     Blood Culture, Routine No growth after 5 days.    Blood culture [[id number]] Collected: 10/12/20 0958    Order Status: Completed Specimen: Blood from Peripheral, Right Hand Updated: 10/17/20 1412     Blood Culture, Routine No growth after 5 days.    Blood culture [[id number]] Collected: 10/12/20 0952    Order Status: Completed Specimen: Blood from Peripheral, Right Hand Updated: 10/17/20 1412     Blood Culture, Routine No growth after 5 days.          Radiology: X-Ray: CXR: X-Ray Chest 1 View (CXR): No results found for this visit on 10/11/20. and X-Ray Chest PA and Lateral (CXR): No results found for this visit on 10/11/20.      Final Diagnoses: Same as principal problem.    Discharged Condition: good  Face to face services were provided on 10/23/2020   Time Spent:  I spent > 30 minutes on the discharge, which included reviewing hospital course with patient/family, reviewing discharge medications, and arranging follow-up care.  Physical Exam on 10/23/2020:  /72   Pulse 68   Temp 97.6 °F (36.4 °C) (Axillary)   Resp 18   Ht 5' 1" (1.549 m)   Wt 53.6 kg (118 lb 2.7 oz)   LMP  (LMP Unknown)   SpO2 98%   Breastfeeding No   BMI 22.33 kg/m²       Disposition: Group home    Follow Up Instructions:   Follow-up Information     Schedule an appointment as soon as possible for a visit with Cristóbal Krishnan MD.    Specialty: Pediatrics  Why: WILL BE SEEN AT " FACILITY BY DR BAE / HE IS MED DIRECTOR AT [facility]  Contact information:  [address] [place] [address] [address]  [id number]  [place] [address]  [phone]             [facility] GENERAL SURGERY In 2 weeks.    Specialty: General Surgery  Why: For management of PEG tube  Contact information:  [address]  [facility] [address]  [phone]               No future appointments.    Medications:    Current Discharge Medication List      CONTINUE these medications which have NOT CHANGED    Details   baclofen (LIORESAL) 10 MG tablet Take 10 mg by mouth 3 (three) times daily.      calcium-vitamin D (CALCIUM-VITAMIN D) 500 mg(1,250mg) -200 unit per tablet Take 1 tablet by mouth 2 (two) times daily with meals.        diazepam 20 mg Kit Place rectally. Use prn for seizures       fluticasone (FLONASE) 50 mcg/actuation nasal spray 1 spray by Each Nare route once daily.      hydroxypropyl methylcellulose (ISOPTO TEARS) 2.5 % ophthalmic solution 1 drop as needed.        lactulose (CHRONULAC) 10 gram/15 mL solution Take by mouth 3 (three) times daily.        levetiracetam (KEPPRA) 100 mg/mL Soln Take 20 mg/kg by mouth 2 (two) times daily.        loratadine (CLARITIN) 10 mg tablet Take 10 mg by mouth once daily.      melatonin 3 mg Tab Take by mouth nightly as needed.        montelukast (SINGULAIR) 10 mg tablet Take 10 mg by mouth every evening.      mv-min-FA-Qb-Op-kkmflwa-lutein (MULTIVITAL) 0.4-162-18 mg Tab Take by mouth.        pantoprazole (PROTONIX) 40 MG tablet Take 40 mg by mouth once daily.        trypsin-balsam-castor oil (VASOLEX) 9087-364 unit-mg-mg/gram Oint Apply topically once daily. Apply daily to periwound as directed             Discharge Instructions:  Discharge Procedure Orders   Ambulatory referral/consult to General Surgery   Standing Status: Future   Referral Priority: Routine Referral Type: Consultation   Referral Reason: Specialty Services Required   Requested Specialty: General Surgery   Number of  Visits Requested: 1     Notify your health care provider if you experience any of the following:  temperature >100.4     Notify your health care provider if you experience any of the following:  persistent nausea and vomiting or diarrhea     Notify your health care provider if you experience any of the following:  severe uncontrolled pain     Notify your health care provider if you experience any of the following:  redness, tenderness, or signs of infection (pain, swelling, redness, odor or green/yellow discharge around incision site)     Notify your health care provider if you experience any of the following:  difficulty breathing or increased cough     Notify your health care provider if you experience any of the following:  severe persistent headache     Notify your health care provider if you experience any of the following:  worsening rash     Notify your health care provider if you experience any of the following:  persistent dizziness, light-headedness, or visual disturbances     Notify your health care provider if you experience any of the following:  increased confusion or weakness     Change dressing (specify)   Order Comments: Dressing change: once daily or as needed     Tube Feedings/Formulas     Order Specific Question Answer Comments   Select Adult Formula: Isosource 1.5 sophia    Route: Gastrostomy    Formula Rate (mL/hr): 40      Activity as tolerated         Mert Lockhart MD  Department of Hospital Medicine

## 2020-10-23 NOTE — PLAN OF CARE
10/23/20 0939   Post-Acute Status   Post-Acute Authorization Placement     SW contacted [facility] and spoke with admission coordinator and she is requesting a call from the bedside nurse for an update on patient care and also needing the dc summary to be faxed to [phone].      Lanie Pagan, REKHA  [facility]   [phone]

## 2020-10-23 NOTE — NURSING
Pt discharged, restraints removed, no injuries noted.  Transport service made aware of the need for restraints d/t frequently attempting to pull medical devices and trying to get out of bed.  Father notified of the transfer.

## 2020-10-23 NOTE — PLAN OF CARE
10/23/20 1336   Post-Acute Status   Post-Acute Authorization Placement   Post-Acute Placement Status Set-up Complete     Patient is discharging today back to [facility]. HARRIETT set up transport with [facility] and patient will be transported by stretcher with no oxygen. Nurse call report to [phone]. Transport scheduled for 2:15pm    Updated 2:57pm  HARRIETT spoke to Nallen with [facility] and he reports that transport should be arriving soon to  patient. HARRIETT informed Tha, coordinator with [facility].     Lanie Pagan, REKHA  [facility]   [phone]

## 2020-10-23 NOTE — PLAN OF CARE
Problem: Fall Injury Risk  Goal: Absence of Fall and Fall-Related Injury  Outcome: Met     Problem: Restraint, Nonbehavioral (Nonviolent)  Goal: Discontinuation Criteria Achieved  Outcome: Met  Goal: Personal Dignity and Safety Maintained  Outcome: Met     Problem: Adult Inpatient Plan of Care  Goal: Plan of Care Review  Outcome: Met  Goal: Patient-Specific Goal (Individualization)  Outcome: Met  Goal: Absence of Hospital-Acquired Illness or Injury  Outcome: Met  Goal: Optimal Comfort and Wellbeing  Outcome: Met  Goal: Readiness for Transition of Care  Outcome: Met  Goal: Rounds/Family Conference  Outcome: Met     Problem: Wound  Goal: Optimal Wound Healing  Outcome: Met     Problem: Skin Injury Risk Increased  Goal: Skin Health and Integrity  Outcome: Met       Pt being discharged via transport service to [facility].  Report given to Maya.  No medications are being transported with patient.  No indications of discomfort, VSS.

## 2020-10-26 LAB
BACTERIA BLD CULT: NORMAL
BACTERIA BLD CULT: NORMAL

## 2020-10-27 NOTE — PLAN OF CARE
Patient medically ready for discharge to intermediate [facility]URSING HOME/ [facility]. Any necessary transport setup by SW.  Family/patient aware of discharge.    No future appointments. - WILL BE FOLLOWED BY FACILITY STAFF MD Magalys Gold, RN  Case Management  Ext: [phone]  10/27/2020       10/27/20 0957   Final Note   Assessment Type Final Discharge Note   Anticipated Discharge Disposition snf [facility]   What phone number can be called within the next 1-3 days to see how you are doing after discharge? [phone]   Hospital Follow Up  Appt(s) scheduled? No  (will be followed by facility MD on staff)   Discharge plans and expectations educations in teach back method with documentation complete? No  (Report called to [facility] per bedside nurse / SW faxed packet)   Right Care Referral Info   Post Acute Recommendation No Care   Post-Acute Status   Post-Acute Authorization Placement   Other Status See Comments  (returned to snf placement at [facility])   Discharge Delays None known at this time

## 2020-11-20 ENCOUNTER — TELEPHONE (OUTPATIENT)
Dept: INTERNAL MEDICINE | Facility: CLINIC | Age: 30
End: 2020-11-20

## 2020-11-20 NOTE — TELEPHONE ENCOUNTER
----- Message from Trice Blake sent at 11/20/2020 11:29 AM CST -----  Regarding: Specimen Issue  There was an issue with the L. pneumophilia test ordered on this patient.    The reference lab received a sample however, there was a lab accident and testing could not be performed. The order has been cancelled and will need to be reordered and recollected.    If there are any questions, please call the Sendout lab at [phone] ext. [phone].  Anyone in the Sendout lab will be able to assist you.

## 2024-01-17 ENCOUNTER — HOSPITAL ENCOUNTER (EMERGENCY)
Facility: HOSPITAL | Age: 34
Discharge: HOME | End: 2024-01-17
Attending: STUDENT IN AN ORGANIZED HEALTH CARE EDUCATION/TRAINING PROGRAM
Payer: MEDICAID

## 2024-01-17 VITALS
TEMPERATURE: 95 F | HEIGHT: 61 IN | WEIGHT: 118 LBS | SYSTOLIC BLOOD PRESSURE: 116 MMHG | BODY MASS INDEX: 22.28 KG/M2 | OXYGEN SATURATION: 100 % | HEART RATE: 52 BPM | RESPIRATION RATE: 15 BRPM | DIASTOLIC BLOOD PRESSURE: 79 MMHG

## 2024-01-17 DIAGNOSIS — G80.9 CEREBRAL PALSY, UNSPECIFIED TYPE: ICD-10-CM

## 2024-01-17 DIAGNOSIS — T85.528A DISLODGED GASTROSTOMY TUBE: Primary | ICD-10-CM

## 2024-01-17 PROCEDURE — 99283 EMERGENCY DEPT VISIT LOW MDM: CPT | Mod: 25

## 2024-01-17 PROCEDURE — 43762 RPLC GTUBE NO REVJ TRC: CPT

## 2024-01-17 NOTE — ED PROVIDER NOTES
Encounter Date: 1/17/2024    SCRIBE #1 NOTE: I, Maxi Otero, am scribing for, and in the presence of,  Triston Flynn MD.       History     Chief Complaint   Patient presents with    Peg Tube     Pt to ED via EMS with complaints of accidental peg tube removal. Currently has coleman catheter in place- unknown what Guyanese.     33 y.o. female with a PMHx of ADHD, cerebral palsy, encephalopathy, infantile myoclonic epilepsy, mental retardation, seizures, presents to the ED with caregiver for evaluation of a PEG tube removal. History provided by independent historian, patient's caregiver at [facility] reports that the patient has a PEG tube in place for years now and was sent here for a PEG tube replacement. Reports that the PEG tube was possibly removed by the patient. Denies any active bleeding around the site. Denies any associates symptoms at this time.     The history is provided by a caregiver. No  was used.     Review of patient's allergies indicates:  No Known Allergies  Past Medical History:   Diagnosis Date    ADHD (attention deficit hyperactivity disorder)     Blood transfusion     Cerebral palsy     Congenital hip dislocation     Encephalopathy     Infantile myoclonic epilepsy     Iron deficiency anemia     Mental retardation     Pneumonia, aspiration     Seizures     Spina bifida aperta      Past Surgical History:   Procedure Laterality Date    ABDOMINAL SURGERY      BRAIN SURGERY      GASTROSTOMY      PEG tube    HIP SURGERY      JOINT REPLACEMENT       No family history on file.  Social History     Tobacco Use    Smoking status: Never    Smokeless tobacco: Never   Substance Use Topics    Alcohol use: No    Drug use: No     Review of Systems    Physical Exam     Initial Vitals [01/17/24 0951]   BP Pulse Resp Temp SpO2   116/79 (!) 52 15 -- 100 %      MAP       --         Physical Exam    Vitals reviewed.  Constitutional: She appears well-nourished. She is not diaphoretic. No distress.    Abdominal: Abdomen is soft. She exhibits no distension. There is no abdominal tenderness.   Camarillo currently in place at PEG tube site There is no rebound and no guarding.     Neurological: She is alert.   Skin: Skin is warm and dry. No rash noted.         ED Course   Feeding Tube    Date/Time: 1/17/2024 10:47 AM  Location procedure was performed: [facility] EMERGENCY DEPARTMENT    Performed by: Triston Flynn MD  Authorized by: Triston Flynn MD  Pre-operative diagnosis: Dislodged PEG tube  Consent: Verbal consent obtained.  Risks and benefits: risks, benefits and alternatives were discussed  Consent given by: guardian  Indications: tube dislodged  Preparation: Patient was prepped and draped in the usual sterile fashion.    Sedation:  Patient sedated: no    Local anesthesia used: no    Anesthesia:  Local anesthesia used: no  Tube type: gastrostomy  Patient position: supine  Procedure type: replacement  Tube size: 14 Fr  Endoscope used: no  Bulb inflation volume: 6 (ml)  Bulb inflation fluid: normal saline  Placement/position confirmation: gastric contents aspirated  Tube placement difficulty: none  Complications: No  Patient tolerance: patient tolerated the procedure well with no immediate complications        Labs Reviewed - No data to display       Imaging Results    None          Medications - No data to display  Medical Decision Making  Differential diagnoses considered includes PEG tube dislodgement, peg tube malfunction, tract trauma    Site is clean and without bleeding, low suspicion for traumatic removal.  PEG tube replaced without complication; see procedure note full details. Patient will be discharged at this time. Patient has been given home care instructions, follow up instructions, and strict return precautions. They agree with and are comfortable with the plan.     Amount and/or Complexity of Data Reviewed  Independent Historian: caregiver     Details: See HPI.     Risk  Diagnosis or treatment  significantly limited by social determinants of health.            Scribe Attestation:   Scribe #1: I performed the above scribed service and the documentation accurately describes the services I performed. I attest to the accuracy of the note.                                 I, Triston Flynn MD, personally performed the services described in this documentation. All medical record entries made by the scribe were at my direction and in my presence. I have reviewed the chart and agree that the record reflects my personal performance and is accurate and complete.              Clinical Impression:  Final diagnoses:  [T85.528A] Dislodged gastrostomy tube (Primary)  [G80.9] Cerebral palsy, unspecified type          ED Disposition Condition    Discharge Stable          ED Prescriptions    None       Follow-up Information       Follow up With Specialties Details Why Contact Info    Cristóbal Krishnan MD Pediatrics Schedule an appointment as soon as possible for a visit  To discuss your recent ER visit and any additional concerns that you may have [address] [place] [address]  [id number]  [place] [address]  [phone]      [facility] - Emergency Dept Emergency Medicine Go to  As needed, If symptoms worsen [address] [facility] [place] [address]  [phone]             Triston Flynn MD  01/17/24 1049

## 2024-01-17 NOTE — ED NOTES
Patient to ED for PEG tube replacement. Staff at facility placed catheter to hold place. Patient nonverbal at baseline.

## 2024-03-01 ENCOUNTER — HOSPITAL ENCOUNTER (EMERGENCY)
Facility: HOSPITAL | Age: 34
Discharge: NURSING, REHAB OR LONG-TERM CARE | End: 2024-03-01
Attending: STUDENT IN AN ORGANIZED HEALTH CARE EDUCATION/TRAINING PROGRAM
Payer: MEDICAID

## 2024-03-01 VITALS
TEMPERATURE: 98 F | BODY MASS INDEX: 22.28 KG/M2 | HEIGHT: 61 IN | RESPIRATION RATE: 18 BRPM | OXYGEN SATURATION: 99 % | DIASTOLIC BLOOD PRESSURE: 67 MMHG | WEIGHT: 118 LBS | SYSTOLIC BLOOD PRESSURE: 131 MMHG | HEART RATE: 77 BPM

## 2024-03-01 DIAGNOSIS — K94.23 PEG TUBE MALFUNCTION: ICD-10-CM

## 2024-03-01 DIAGNOSIS — Z93.1 S/P PERCUTANEOUS ENDOSCOPIC GASTROSTOMY (PEG) TUBE PLACEMENT: Primary | ICD-10-CM

## 2024-03-01 PROCEDURE — 43762 RPLC GTUBE NO REVJ TRC: CPT

## 2024-03-01 PROCEDURE — 99284 EMERGENCY DEPT VISIT MOD MDM: CPT | Mod: 25

## 2024-03-01 PROCEDURE — 25500020 PHARM REV CODE 255: Performed by: STUDENT IN AN ORGANIZED HEALTH CARE EDUCATION/TRAINING PROGRAM

## 2024-03-01 RX ADMIN — DIATRIZOATE MEGLUMINE AND DIATRIZOATE SODIUM 100 ML: 600; 100 SOLUTION ORAL; RECTAL at 03:03

## 2024-03-01 NOTE — ED TRIAGE NOTES
Pt presents to ED via EMS due to feeding tube being pulled out. Per caregiver, she pulled out feeding tube and is unsure how long it was out for. Pt unable to speak for herself, obtained information from caregiver.

## 2024-03-01 NOTE — ED NOTES
Attempted to call nursing unit at [facility] to give report. No answer. Report given to caregiver at bedside. Discharge paperwork given to caregiver at bedside. Waiting on transport at this time. Care ongoing.

## 2024-12-03 ENCOUNTER — HOSPITAL ENCOUNTER (INPATIENT)
Facility: HOSPITAL | Age: 34
LOS: 3 days | Discharge: NURSING, REHAB OR LONG-TERM CARE | DRG: 872 | End: 2024-12-06
Attending: EMERGENCY MEDICINE
Payer: MEDICAID

## 2024-12-03 DIAGNOSIS — K94.23 PEG TUBE MALFUNCTION: ICD-10-CM

## 2024-12-03 DIAGNOSIS — R00.1 BRADYCARDIA: ICD-10-CM

## 2024-12-03 DIAGNOSIS — T68.XXXA HYPOTHERMIA, INITIAL ENCOUNTER: Primary | ICD-10-CM

## 2024-12-03 DIAGNOSIS — R07.9 CHEST PAIN: ICD-10-CM

## 2024-12-03 DIAGNOSIS — G80.9 CEREBRAL PALSY, UNSPECIFIED TYPE: ICD-10-CM

## 2024-12-03 DIAGNOSIS — G40.909 SEIZURE DISORDER: ICD-10-CM

## 2024-12-03 LAB
ALBUMIN SERPL BCP-MCNC: 3.6 G/DL (ref 3.5–5.2)
ALLENS TEST: NORMAL
ALP SERPL-CCNC: 79 U/L (ref 40–150)
ALT SERPL W/O P-5'-P-CCNC: 31 U/L (ref 10–44)
ANION GAP SERPL CALC-SCNC: 6 MMOL/L (ref 8–16)
AST SERPL-CCNC: 20 U/L (ref 10–40)
BASOPHILS # BLD AUTO: 0.02 K/UL (ref 0–0.2)
BASOPHILS NFR BLD: 0.7 % (ref 0–1.9)
BILIRUB SERPL-MCNC: 0.2 MG/DL (ref 0.1–1)
BUN SERPL-MCNC: 22 MG/DL (ref 6–20)
CALCIUM SERPL-MCNC: 10 MG/DL (ref 8.7–10.5)
CHLORIDE SERPL-SCNC: 111 MMOL/L (ref 95–110)
CO2 SERPL-SCNC: 30 MMOL/L (ref 23–29)
CREAT SERPL-MCNC: 0.7 MG/DL (ref 0.5–1.4)
CTP QC/QA: YES
DIFFERENTIAL METHOD BLD: ABNORMAL
EOSINOPHIL # BLD AUTO: 0.1 K/UL (ref 0–0.5)
EOSINOPHIL NFR BLD: 5.1 % (ref 0–8)
ERYTHROCYTE [DISTWIDTH] IN BLOOD BY AUTOMATED COUNT: 12.6 % (ref 11.5–14.5)
EST. GFR  (NO RACE VARIABLE): >60 ML/MIN/1.73 M^2
GLUCOSE SERPL-MCNC: 63 MG/DL (ref 70–110)
HCT VFR BLD AUTO: 42.2 % (ref 37–48.5)
HGB BLD-MCNC: 13.4 G/DL (ref 12–16)
IMM GRANULOCYTES # BLD AUTO: 0 K/UL (ref 0–0.04)
IMM GRANULOCYTES NFR BLD AUTO: 0 % (ref 0–0.5)
LDH SERPL L TO P-CCNC: 0.83 MMOL/L (ref 0.5–2.2)
LYMPHOCYTES # BLD AUTO: 1.6 K/UL (ref 1–4.8)
LYMPHOCYTES NFR BLD: 59.1 % (ref 18–48)
MAGNESIUM SERPL-MCNC: 2 MG/DL (ref 1.6–2.6)
MCH RBC QN AUTO: 28.8 PG (ref 27–31)
MCHC RBC AUTO-ENTMCNC: 31.8 G/DL (ref 32–36)
MCV RBC AUTO: 91 FL (ref 82–98)
MONOCYTES # BLD AUTO: 0.3 K/UL (ref 0.3–1)
MONOCYTES NFR BLD: 12.3 % (ref 4–15)
NEUTROPHILS # BLD AUTO: 0.6 K/UL (ref 1.8–7.7)
NEUTROPHILS NFR BLD: 22.8 % (ref 38–73)
NRBC BLD-RTO: 0 /100 WBC
PHOSPHATE SERPL-MCNC: 4 MG/DL (ref 2.7–4.5)
PLATELET # BLD AUTO: 143 K/UL (ref 150–450)
PMV BLD AUTO: 12.2 FL (ref 9.2–12.9)
POC MOLECULAR INFLUENZA A AGN: NEGATIVE
POC MOLECULAR INFLUENZA B AGN: NEGATIVE
POTASSIUM SERPL-SCNC: 4.5 MMOL/L (ref 3.5–5.1)
PROCALCITONIN SERPL IA-MCNC: <0.02 NG/ML
PROT SERPL-MCNC: 7.6 G/DL (ref 6–8.4)
RBC # BLD AUTO: 4.66 M/UL (ref 4–5.4)
SAMPLE: NORMAL
SITE: NORMAL
SODIUM SERPL-SCNC: 147 MMOL/L (ref 136–145)
TROPONIN I SERPL DL<=0.01 NG/ML-MCNC: <0.006 NG/ML (ref 0–0.03)
WBC # BLD AUTO: 2.76 K/UL (ref 3.9–12.7)

## 2024-12-03 PROCEDURE — 11000001 HC ACUTE MED/SURG PRIVATE ROOM

## 2024-12-03 PROCEDURE — 84484 ASSAY OF TROPONIN QUANT: CPT | Performed by: EMERGENCY MEDICINE

## 2024-12-03 PROCEDURE — 63600175 PHARM REV CODE 636 W HCPCS: Performed by: STUDENT IN AN ORGANIZED HEALTH CARE EDUCATION/TRAINING PROGRAM

## 2024-12-03 PROCEDURE — 83605 ASSAY OF LACTIC ACID: CPT

## 2024-12-03 PROCEDURE — 84100 ASSAY OF PHOSPHORUS: CPT | Performed by: EMERGENCY MEDICINE

## 2024-12-03 PROCEDURE — 80053 COMPREHEN METABOLIC PANEL: CPT | Performed by: EMERGENCY MEDICINE

## 2024-12-03 PROCEDURE — 85025 COMPLETE CBC W/AUTO DIFF WBC: CPT | Performed by: EMERGENCY MEDICINE

## 2024-12-03 PROCEDURE — 80177 DRUG SCRN QUAN LEVETIRACETAM: CPT | Performed by: STUDENT IN AN ORGANIZED HEALTH CARE EDUCATION/TRAINING PROGRAM

## 2024-12-03 PROCEDURE — 84145 PROCALCITONIN (PCT): CPT | Performed by: EMERGENCY MEDICINE

## 2024-12-03 PROCEDURE — 87040 BLOOD CULTURE FOR BACTERIA: CPT | Mod: 59 | Performed by: EMERGENCY MEDICINE

## 2024-12-03 PROCEDURE — 93010 ELECTROCARDIOGRAM REPORT: CPT | Mod: ,,, | Performed by: INTERNAL MEDICINE

## 2024-12-03 PROCEDURE — 51798 US URINE CAPACITY MEASURE: CPT

## 2024-12-03 PROCEDURE — 83735 ASSAY OF MAGNESIUM: CPT | Performed by: EMERGENCY MEDICINE

## 2024-12-03 PROCEDURE — 87502 INFLUENZA DNA AMP PROBE: CPT

## 2024-12-03 PROCEDURE — 99285 EMERGENCY DEPT VISIT HI MDM: CPT | Mod: 25

## 2024-12-03 PROCEDURE — 99900035 HC TECH TIME PER 15 MIN (STAT)

## 2024-12-03 PROCEDURE — 93005 ELECTROCARDIOGRAM TRACING: CPT

## 2024-12-03 RX ORDER — PROCHLORPERAZINE EDISYLATE 5 MG/ML
5 INJECTION INTRAMUSCULAR; INTRAVENOUS EVERY 6 HOURS PRN
Status: DISCONTINUED | OUTPATIENT
Start: 2024-12-03 | End: 2024-12-06 | Stop reason: HOSPADM

## 2024-12-03 RX ORDER — ACETAMINOPHEN 325 MG/1
650 TABLET ORAL EVERY 8 HOURS PRN
Status: DISCONTINUED | OUTPATIENT
Start: 2024-12-03 | End: 2024-12-06 | Stop reason: HOSPADM

## 2024-12-03 RX ORDER — SODIUM CHLORIDE 0.9 % (FLUSH) 0.9 %
3 SYRINGE (ML) INJECTION EVERY 12 HOURS PRN
Status: DISCONTINUED | OUTPATIENT
Start: 2024-12-03 | End: 2024-12-06 | Stop reason: HOSPADM

## 2024-12-03 RX ORDER — TALC
6 POWDER (GRAM) TOPICAL NIGHTLY PRN
Status: DISCONTINUED | OUTPATIENT
Start: 2024-12-03 | End: 2024-12-06 | Stop reason: HOSPADM

## 2024-12-03 RX ORDER — POLYETHYLENE GLYCOL 3350 17 G/17G
17 POWDER, FOR SOLUTION ORAL DAILY PRN
Status: DISCONTINUED | OUTPATIENT
Start: 2024-12-03 | End: 2024-12-06 | Stop reason: HOSPADM

## 2024-12-03 RX ORDER — IBUPROFEN 200 MG
24 TABLET ORAL
Status: DISCONTINUED | OUTPATIENT
Start: 2024-12-03 | End: 2024-12-06 | Stop reason: HOSPADM

## 2024-12-03 RX ORDER — SIMETHICONE 80 MG
1 TABLET,CHEWABLE ORAL 4 TIMES DAILY PRN
Status: DISCONTINUED | OUTPATIENT
Start: 2024-12-03 | End: 2024-12-06 | Stop reason: HOSPADM

## 2024-12-03 RX ORDER — GLUCAGON 1 MG
1 KIT INJECTION
Status: DISCONTINUED | OUTPATIENT
Start: 2024-12-03 | End: 2024-12-06 | Stop reason: HOSPADM

## 2024-12-03 RX ORDER — ACETAMINOPHEN 325 MG/1
650 TABLET ORAL EVERY 4 HOURS PRN
Status: DISCONTINUED | OUTPATIENT
Start: 2024-12-03 | End: 2024-12-06 | Stop reason: HOSPADM

## 2024-12-03 RX ORDER — ONDANSETRON HYDROCHLORIDE 2 MG/ML
4 INJECTION, SOLUTION INTRAVENOUS EVERY 8 HOURS PRN
Status: DISCONTINUED | OUTPATIENT
Start: 2024-12-03 | End: 2024-12-06 | Stop reason: HOSPADM

## 2024-12-03 RX ORDER — LORAZEPAM 2 MG/ML
0.5 INJECTION INTRAMUSCULAR ONCE
Status: COMPLETED | OUTPATIENT
Start: 2024-12-03 | End: 2024-12-03

## 2024-12-03 RX ORDER — IBUPROFEN 200 MG
16 TABLET ORAL
Status: DISCONTINUED | OUTPATIENT
Start: 2024-12-03 | End: 2024-12-06 | Stop reason: HOSPADM

## 2024-12-03 RX ORDER — ALUMINUM HYDROXIDE, MAGNESIUM HYDROXIDE, AND SIMETHICONE 1200; 120; 1200 MG/30ML; MG/30ML; MG/30ML
30 SUSPENSION ORAL 4 TIMES DAILY PRN
Status: DISCONTINUED | OUTPATIENT
Start: 2024-12-03 | End: 2024-12-06 | Stop reason: HOSPADM

## 2024-12-03 RX ADMIN — LORAZEPAM 0.5 MG: 2 INJECTION INTRAMUSCULAR; INTRAVENOUS at 11:12

## 2024-12-03 NOTE — ED PROVIDER NOTES
Encounter Date: 12/3/2024    SCRIBE #1 NOTE: I, Ildefonso Bruce Do, am scribing for, and in the presence of,  Magdy Felix MD. I have scribed the following portions of the note - the EKG reading. Other sections scribed: HPI, ROS, PE.       History     Chief Complaint   Patient presents with    Bradycardia     Pt arrived via ems, pt chief complaint is Bradycardia. Pt was sent from [facility] for low heart rate, pt HR is 52 in triage.      Alexander Sainz is a 34 y.o. female, with a pertinent PMHx of cerebral palsy, encephalopathy, iron deficiency anemia, and seizures, who presents to the ED via EMS with bradycardia onset today. Per EMS, historian, they note the patient was sent from [facility] for bradycardia. Staff at [facility] note a heart rate in the 40-50's BPM, endorses her baseline is at 60-70's BPM. They note she is somewhat responsive and is able to shake her hand at baseline. Staff notes she appeared generally unresponsive today. Staff also notes hypothermia. Staff has no other complaints at the present time    The history is provided by the EMS personnel. No  was used.     Review of patient's allergies indicates:  No Known Allergies  Past Medical History:   Diagnosis Date    ADHD (attention deficit hyperactivity disorder)     Blood transfusion     Cerebral palsy     Congenital hip dislocation     Encephalopathy     Infantile myoclonic epilepsy     Iron deficiency anemia     Mental retardation     Pneumonia, aspiration     Seizures     Spina bifida aperta      Past Surgical History:   Procedure Laterality Date    ABDOMINAL SURGERY      BRAIN SURGERY      GASTROSTOMY      PEG tube    HIP SURGERY      JOINT REPLACEMENT       No family history on file.  Social History     Tobacco Use    Smoking status: Never    Smokeless tobacco: Never   Substance Use Topics    Alcohol use: No    Drug use: No     Review of Systems   Unable to perform ROS: Mental status change       Physical Exam      Initial Vitals   BP Pulse Resp Temp SpO2   12/03/24 1611 12/03/24 1611 12/03/24 1611 12/03/24 1653 12/03/24 1611   102/68 (!) 52 20 (!) 93 °F (33.9 °C) 95 %      MAP       --                Physical Exam    Constitutional: She appears well-developed and well-nourished.   Non-communicative.    HENT:   Head: Normocephalic and atraumatic.   Eyes: Conjunctivae are normal. Pupils are equal, round, and reactive to light.   Spontaneously opens her eyes. There is bilateral corneal reflexes.    Neck: Neck supple. No thyroid mass present.   Cardiovascular:  Regular rhythm, S1 normal, S2 normal, normal heart sounds and intact distal pulses.           Bradycardia with 46-47 BPM.    Pulmonary/Chest: Breath sounds normal. No respiratory distress.   Abdominal: Abdomen is soft. Bowel sounds are normal.   Musculoskeletal:         General: No tenderness. Normal range of motion.      Cervical back: Neck supple.     Lymphadenopathy:     She has no axillary adenopathy.   Neurological: She is alert and oriented to person, place, and time.   GCS 11. Patient responds and winces to pain. Appears generally unresponsive.    Skin: Skin is warm, dry and intact.         ED Course   Procedures  Labs Reviewed   CBC W/ AUTO DIFFERENTIAL - Abnormal       Result Value    WBC 2.76 (*)     RBC 4.66      Hemoglobin 13.4      Hematocrit 42.2      MCV 91      MCH 28.8      MCHC 31.8 (*)     RDW 12.6      Platelets 143 (*)     MPV 12.2      Immature Granulocytes 0.0      Gran # (ANC) 0.6 (*)     Immature Grans (Abs) 0.00      Lymph # 1.6      Mono # 0.3      Eos # 0.1      Baso # 0.02      nRBC 0      Gran % 22.8 (*)     Lymph % 59.1 (*)     Mono % 12.3      Eosinophil % 5.1      Basophil % 0.7      Differential Method Automated     COMPREHENSIVE METABOLIC PANEL - Abnormal    Sodium 147 (*)     Potassium 4.5      Chloride 111 (*)     CO2 30 (*)     Glucose 63 (*)     BUN 22 (*)     Creatinine 0.7      Calcium 10.0      Total Protein 7.6      Albumin  3.6      Total Bilirubin 0.2      Alkaline Phosphatase 79      AST 20      ALT 31      eGFR >60      Anion Gap 6 (*)    MAGNESIUM    Magnesium 2.0     PHOSPHORUS    Phosphorus 4.0     TROPONIN I    Troponin I <0.006     PROCALCITONIN    Procalcitonin <0.02     POCT INFLUENZA A/B MOLECULAR    POC Molecular Influenza A Ag Negative      POC Molecular Influenza B Ag Negative       Acceptable Yes     ISTAT LACTATE    POC Lactate 0.83      Sample VENOUS      Site Other      Allens Test N/A       EKG Readings: (Independently Interpreted)   Initial Reading: No STEMI. Rhythm: Sinus Bradycardia. Heart Rate: 48. Ectopy: No Ectopy. Conduction: 1st Degree AV Block. Clinical Impression: Sinus Bradycardia   Early repolarization.        Imaging Results              CT Head Without Contrast (Final result)  Result time 12/03/24 19:13:22      Final result by Antonella Graf MD (12/03/24 19:13:22)                   Impression:      No acute intracranial abnormality detected.  No significant change.      Electronically signed by: Antonella Graf  Date:    12/03/2024  Time:    19:13               Narrative:    EXAMINATION:  CT OF THE HEAD WITHOUT    CLINICAL HISTORY:  Bradycardia.Mental status change, unknown cause;    TECHNIQUE:  5 mm unenhanced axial images were obtained from the skull base to the vertex.    COMPARISON:  10/20/2020    FINDINGS:  The ventricles, basal cisterns, and cortical sulci are stable.  Left-sided ventriculomegaly and colpocephaly are again seen.  There is no acute intracranial hemorrhage, territorial infarct or mass effect, or midline shift. The visualized paranasal sinuses and mastoid air cells are clear. Remote bony defects and bony depression are again seen at the anterior frontal sinuses.                                       X-Ray Chest AP Portable (Final result)  Result time 12/03/24 18:19:43      Final result by Antonella Graf MD (12/03/24 18:19:43)                   Impression:       No acute intrathoracic abnormality detected.  Right basilar atelectasis with asymmetric elevation right hemidiaphragm.      Electronically signed by: Antonella Graf  Date:    12/03/2024  Time:    18:19               Narrative:    EXAMINATION:  AP PORTABLE CHEST    CLINICAL HISTORY:  Sepsis;    TECHNIQUE:  AP portable chest radiograph was submitted.    COMPARISON:  10/13/2020    FINDINGS:  AP portable chest radiograph demonstrates a cardiac silhouette within normal limits.  There is no focal consolidation, pneumothorax, or pleural effusion. There is asymmetric elevation of right hemidiaphragm.  There is right basilar atelectasis.                                       Medications   sodium chloride 0.9% flush 3 mL (has no administration in time range)   melatonin tablet 6 mg (has no administration in time range)   ondansetron injection 4 mg (has no administration in time range)   prochlorperazine injection Soln 5 mg (has no administration in time range)   polyethylene glycol packet 17 g (has no administration in time range)   acetaminophen tablet 650 mg (has no administration in time range)   simethicone chewable tablet 80 mg (has no administration in time range)   aluminum-magnesium hydroxide-simethicone 200-200-20 mg/5 mL suspension 30 mL (has no administration in time range)   acetaminophen tablet 650 mg (has no administration in time range)   glucose chewable tablet 16 g (has no administration in time range)   glucose chewable tablet 24 g (has no administration in time range)   glucagon (human recombinant) injection 1 mg (has no administration in time range)   baclofen tablet 10 mg (10 mg Oral Given 12/4/24 0853)   levetiracetam 500 mg/5 mL (5 mL) liquid Soln 1,080 mg (1,080 mg Per G Tube Given 12/4/24 0854)   montelukast tablet 10 mg (10 mg Per G Tube Given 12/4/24 0055)   cefTRIAXone injection 1 g (1 g Intravenous Given 12/4/24 0407)   clonazePAM disintegrating tablet 0.25 mg (has no administration in time  range)   enoxaparin injection 40 mg (has no administration in time range)   pantoprazole injection 40 mg (40 mg Intravenous Given 12/4/24 8030)   LORazepam injection 0.5 mg (0.5 mg Intravenous Given 12/3/24 2616)     Medical Decision Making  Alexander Sainz is a 34 y.o. female, with a pertinent PMHx of cerebral palsy, encephalopathy, iron deficiency anemia, and seizures, who presents to the ED via EMS with bradycardia onset today. Per EMS, historian, they note the patient was sent from [facility] for bradycardia. Staff at [facility] note a heart rate in the 40-50's BPM, endorses her baseline is at 60-70's BPM. They note she is somewhat responsive and is able to shake her hand at baseline. Staff notes she appeared generally unresponsive today. Staff also notes hypothermia. Staff has no other complaints at the present time.  Currently the patient is no longer postictal.  However she remains bradycardic in the upper 40s.  Her hypothermia is significantly improved please see that nursing documentation.  The patient per family members is currently back at baseline.  Still however I am concerned about the bradycardia.  I believe the hypothermia is likely related to her prolonged postictal state.  Family member also stated that there maybe a hypothalamic issue since she had this happened to her before.  Nonetheless feel the patient warrants admission to the hospital for continued workup for bradycardia and monitoring of her seizure activity.    Amount and/or Complexity of Data Reviewed  Independent Historian: EMS     Details: See HPI.  External Data Reviewed: notes.     Details: See HPI.  Labs: ordered.  Radiology: ordered.  ECG/medicine tests: ordered and independent interpretation performed. Decision-making details documented in ED Course.            Scribe Attestation:   Scribe #1: I performed the above scribed service and the documentation accurately describes the services I performed. I attest to the accuracy of the  note.                               Clinical Impression:  Final diagnoses:  [R00.1] Bradycardia  [T68.XXXA] Hypothermia, initial encounter (Primary)  [G40.909] Seizure disorder       This document was produced by a scribe under my direction and in my presence. I agree with the content of the note and have made any necessary edits.     Magdy Felix MD    12/04/2024 9:13 AM     ED Disposition Condition    Admit Stable                Magdy Felix MD  12/04/24 0913

## 2024-12-04 PROBLEM — R00.1 BRADYCARDIA: Status: ACTIVE | Noted: 2024-12-04

## 2024-12-04 LAB
ANION GAP SERPL CALC-SCNC: 10 MMOL/L (ref 8–16)
BACTERIA #/AREA URNS HPF: ABNORMAL /HPF
BASOPHILS # BLD AUTO: 0.02 K/UL (ref 0–0.2)
BASOPHILS NFR BLD: 0.5 % (ref 0–1.9)
BILIRUB UR QL STRIP: NEGATIVE
BUN SERPL-MCNC: 19 MG/DL (ref 6–20)
CALCIUM SERPL-MCNC: 9.3 MG/DL (ref 8.7–10.5)
CHLORIDE SERPL-SCNC: 114 MMOL/L (ref 95–110)
CLARITY UR: ABNORMAL
CO2 SERPL-SCNC: 24 MMOL/L (ref 23–29)
COLOR UR: YELLOW
CREAT SERPL-MCNC: 0.7 MG/DL (ref 0.5–1.4)
DIFFERENTIAL METHOD BLD: ABNORMAL
EOSINOPHIL # BLD AUTO: 0.1 K/UL (ref 0–0.5)
EOSINOPHIL NFR BLD: 3.3 % (ref 0–8)
ERYTHROCYTE [DISTWIDTH] IN BLOOD BY AUTOMATED COUNT: 12.1 % (ref 11.5–14.5)
EST. GFR  (NO RACE VARIABLE): >60 ML/MIN/1.73 M^2
GLUCOSE SERPL-MCNC: 78 MG/DL (ref 70–110)
GLUCOSE UR QL STRIP: NEGATIVE
HCT VFR BLD AUTO: 40.6 % (ref 37–48.5)
HGB BLD-MCNC: 12.8 G/DL (ref 12–16)
HGB UR QL STRIP: NEGATIVE
IMM GRANULOCYTES # BLD AUTO: 0 K/UL (ref 0–0.04)
IMM GRANULOCYTES NFR BLD AUTO: 0 % (ref 0–0.5)
KETONES UR QL STRIP: NEGATIVE
LEUKOCYTE ESTERASE UR QL STRIP: ABNORMAL
LYMPHOCYTES # BLD AUTO: 1.9 K/UL (ref 1–4.8)
LYMPHOCYTES NFR BLD: 43.8 % (ref 18–48)
MAGNESIUM SERPL-MCNC: 1.7 MG/DL (ref 1.6–2.6)
MCH RBC QN AUTO: 27.6 PG (ref 27–31)
MCHC RBC AUTO-ENTMCNC: 31.5 G/DL (ref 32–36)
MCV RBC AUTO: 88 FL (ref 82–98)
MICROSCOPIC COMMENT: ABNORMAL
MONOCYTES # BLD AUTO: 0.4 K/UL (ref 0.3–1)
MONOCYTES NFR BLD: 9.1 % (ref 4–15)
NEUTROPHILS # BLD AUTO: 1.9 K/UL (ref 1.8–7.7)
NEUTROPHILS NFR BLD: 43.3 % (ref 38–73)
NITRITE UR QL STRIP: POSITIVE
NRBC BLD-RTO: 0 /100 WBC
OHS QRS DURATION: 100 MS
OHS QTC CALCULATION: 402 MS
PH UR STRIP: 8 [PH] (ref 5–8)
PLATELET # BLD AUTO: 167 K/UL (ref 150–450)
PMV BLD AUTO: 11.4 FL (ref 9.2–12.9)
POCT GLUCOSE: 75 MG/DL (ref 70–110)
POCT GLUCOSE: 79 MG/DL (ref 70–110)
POCT GLUCOSE: 89 MG/DL (ref 70–110)
POTASSIUM SERPL-SCNC: 4.2 MMOL/L (ref 3.5–5.1)
PROT UR QL STRIP: ABNORMAL
RBC # BLD AUTO: 4.63 M/UL (ref 4–5.4)
RBC #/AREA URNS HPF: 7 /HPF (ref 0–4)
SODIUM SERPL-SCNC: 148 MMOL/L (ref 136–145)
SP GR UR STRIP: 1.02 (ref 1–1.03)
SQUAMOUS #/AREA URNS HPF: 1 /HPF
TRI-PHOS CRY URNS QL MICRO: ABNORMAL
URN SPEC COLLECT METH UR: ABNORMAL
UROBILINOGEN UR STRIP-ACNC: NEGATIVE EU/DL
WBC # BLD AUTO: 4.27 K/UL (ref 3.9–12.7)
WBC #/AREA URNS HPF: 92 /HPF (ref 0–5)

## 2024-12-04 PROCEDURE — 94761 N-INVAS EAR/PLS OXIMETRY MLT: CPT

## 2024-12-04 PROCEDURE — 63600175 PHARM REV CODE 636 W HCPCS

## 2024-12-04 PROCEDURE — 85025 COMPLETE CBC W/AUTO DIFF WBC: CPT | Performed by: STUDENT IN AN ORGANIZED HEALTH CARE EDUCATION/TRAINING PROGRAM

## 2024-12-04 PROCEDURE — 36415 COLL VENOUS BLD VENIPUNCTURE: CPT | Performed by: STUDENT IN AN ORGANIZED HEALTH CARE EDUCATION/TRAINING PROGRAM

## 2024-12-04 PROCEDURE — 25000003 PHARM REV CODE 250: Performed by: STUDENT IN AN ORGANIZED HEALTH CARE EDUCATION/TRAINING PROGRAM

## 2024-12-04 PROCEDURE — 63600175 PHARM REV CODE 636 W HCPCS: Performed by: STUDENT IN AN ORGANIZED HEALTH CARE EDUCATION/TRAINING PROGRAM

## 2024-12-04 PROCEDURE — 81000 URINALYSIS NONAUTO W/SCOPE: CPT | Performed by: EMERGENCY MEDICINE

## 2024-12-04 PROCEDURE — 83735 ASSAY OF MAGNESIUM: CPT | Performed by: STUDENT IN AN ORGANIZED HEALTH CARE EDUCATION/TRAINING PROGRAM

## 2024-12-04 PROCEDURE — 87088 URINE BACTERIA CULTURE: CPT | Performed by: EMERGENCY MEDICINE

## 2024-12-04 PROCEDURE — 87086 URINE CULTURE/COLONY COUNT: CPT | Performed by: EMERGENCY MEDICINE

## 2024-12-04 PROCEDURE — 25500020 PHARM REV CODE 255

## 2024-12-04 PROCEDURE — 80048 BASIC METABOLIC PNL TOTAL CA: CPT | Performed by: STUDENT IN AN ORGANIZED HEALTH CARE EDUCATION/TRAINING PROGRAM

## 2024-12-04 PROCEDURE — 87186 SC STD MICRODIL/AGAR DIL: CPT | Performed by: EMERGENCY MEDICINE

## 2024-12-04 PROCEDURE — 11000001 HC ACUTE MED/SURG PRIVATE ROOM

## 2024-12-04 RX ORDER — PANTOPRAZOLE SODIUM 40 MG/10ML
40 INJECTION, POWDER, LYOPHILIZED, FOR SOLUTION INTRAVENOUS DAILY
Status: DISCONTINUED | OUTPATIENT
Start: 2024-12-04 | End: 2024-12-06 | Stop reason: HOSPADM

## 2024-12-04 RX ORDER — CEFTRIAXONE 1 G/1
1 INJECTION, POWDER, FOR SOLUTION INTRAMUSCULAR; INTRAVENOUS
Status: DISCONTINUED | OUTPATIENT
Start: 2024-12-04 | End: 2024-12-05

## 2024-12-04 RX ORDER — BACLOFEN 10 MG/1
10 TABLET ORAL 3 TIMES DAILY
Status: DISCONTINUED | OUTPATIENT
Start: 2024-12-04 | End: 2024-12-06 | Stop reason: HOSPADM

## 2024-12-04 RX ORDER — ENOXAPARIN SODIUM 100 MG/ML
40 INJECTION SUBCUTANEOUS EVERY 24 HOURS
Status: DISCONTINUED | OUTPATIENT
Start: 2024-12-04 | End: 2024-12-06 | Stop reason: HOSPADM

## 2024-12-04 RX ORDER — CLONAZEPAM 0.25 MG/1
0.25 TABLET, ORALLY DISINTEGRATING ORAL EVERY 8 HOURS PRN
Status: DISCONTINUED | OUTPATIENT
Start: 2024-12-04 | End: 2024-12-06 | Stop reason: HOSPADM

## 2024-12-04 RX ORDER — LEVETIRACETAM 100 MG/ML
20 SOLUTION ORAL 2 TIMES DAILY
Status: DISCONTINUED | OUTPATIENT
Start: 2024-12-04 | End: 2024-12-06 | Stop reason: HOSPADM

## 2024-12-04 RX ORDER — LEVETIRACETAM 500 MG/5ML
500 INJECTION, SOLUTION, CONCENTRATE INTRAVENOUS ONCE
Status: COMPLETED | OUTPATIENT
Start: 2024-12-04 | End: 2024-12-04

## 2024-12-04 RX ORDER — PANTOPRAZOLE SODIUM 40 MG/1
40 TABLET, DELAYED RELEASE ORAL DAILY
Status: DISCONTINUED | OUTPATIENT
Start: 2024-12-04 | End: 2024-12-04

## 2024-12-04 RX ORDER — DIATRIZOATE MEGLUMINE AND DIATRIZOATE SODIUM 660; 100 MG/ML; MG/ML
60 SOLUTION ORAL; RECTAL
Status: COMPLETED | OUTPATIENT
Start: 2024-12-04 | End: 2024-12-04

## 2024-12-04 RX ORDER — MONTELUKAST SODIUM 10 MG/1
10 TABLET ORAL NIGHTLY
Status: DISCONTINUED | OUTPATIENT
Start: 2024-12-04 | End: 2024-12-06 | Stop reason: HOSPADM

## 2024-12-04 RX ORDER — OLANZAPINE 10 MG/2ML
5 INJECTION, POWDER, FOR SOLUTION INTRAMUSCULAR ONCE
Status: COMPLETED | OUTPATIENT
Start: 2024-12-05 | End: 2024-12-04

## 2024-12-04 RX ADMIN — DIATRIZOATE MEGLUMINE AND DIATRIZOATE SODIUM 60 ML: 600; 100 SOLUTION ORAL; RECTAL at 11:12

## 2024-12-04 RX ADMIN — CEFTRIAXONE 1 G: 1 INJECTION, POWDER, FOR SOLUTION INTRAMUSCULAR; INTRAVENOUS at 04:12

## 2024-12-04 RX ADMIN — MONTELUKAST 10 MG: 10 TABLET, FILM COATED ORAL at 12:12

## 2024-12-04 RX ADMIN — BACLOFEN 10 MG: 10 TABLET ORAL at 09:12

## 2024-12-04 RX ADMIN — OLANZAPINE 5 MG: 10 INJECTION, POWDER, FOR SOLUTION INTRAMUSCULAR at 11:12

## 2024-12-04 RX ADMIN — LEVETIRACETAM 1080 MG: 100 SOLUTION ORAL at 08:12

## 2024-12-04 RX ADMIN — BACLOFEN 10 MG: 10 TABLET ORAL at 08:12

## 2024-12-04 RX ADMIN — MONTELUKAST 10 MG: 10 TABLET, FILM COATED ORAL at 09:12

## 2024-12-04 RX ADMIN — ENOXAPARIN SODIUM 40 MG: 40 INJECTION SUBCUTANEOUS at 05:12

## 2024-12-04 RX ADMIN — LEVETIRACETAM 500 MG: 100 INJECTION INTRAVENOUS at 11:12

## 2024-12-04 RX ADMIN — LEVETIRACETAM 1080 MG: 100 SOLUTION ORAL at 12:12

## 2024-12-04 RX ADMIN — PANTOPRAZOLE SODIUM 40 MG: 40 INJECTION, POWDER, LYOPHILIZED, FOR SOLUTION INTRAVENOUS at 08:12

## 2024-12-04 RX ADMIN — Medication 6 MG: at 09:12

## 2024-12-04 NOTE — PROGRESS NOTES
"[facility] - Telemetry  Adult Nutrition  Progress Note    SUMMARY       Recommendations    Recommendation:  1. Continue pt on TF of Nutren 1.5 advancing to goal rate of 35mL/hr to provide 1260kcal, 57 g protein, 641mL FW with 105mL FWF Q4H or per MD.  2. Monitor weight/labs/residuals for tolerance  3. RD to follow to monitor nutrition status    Goals:  Pt to be advanced to Nutren 1.5 TF goal rate of 35mL/hr by RD follow-up  Nutrition Goal Status: new  Communication of RD Recs:  (POC)    Assessment and Plan  Nutrition Problem  Swallowing Difficulties    Related to (etiology):   Cerebral palsy     Signs and Symptoms (as evidenced by):   Pt receiving TF via Gastrostomy tube w/ balloon    Interventions:  Collaboration by nutrition professional with     Nutrition Diagnosis Status:   New    Malnutrition Assessment  Unable to assess NFPE at this time.    Reason for Assessment  Reason For Assessment: new tube feeding  Diagnosis:  (hypothermia)  General Information Comments: Pt admitted from [facility] with hypothermia. Pt currently on Nutren 1.5 advancing as tolerated to gaol rate of 25mL/hr to provide 1260kcal, 57 g protein, 641mL FW. Jorge Luis 11- skin intact. NFPE not assessed at this time, pt with AMS. No recent weight change per chart.  Nutrition Discharge Planning: d/c on Nutren 1.5 @ 35 mL/hr    Nutrition Risk Screen  Nutrition Risk Screen: tube feeding or parenteral nutrition    Nutrition/Diet History  Factors Affecting Nutritional Intake: NPO    Anthropometrics  Temp: 98.3 °F (36.8 °C)  Height Method: Stated  Height: 4' 7" (139.7 cm)  Height (inches): 55 in  Weight Method: Bed Scale  Weight: 54.2 kg (119 lb 7.8 oz)  Weight (lb): 119.49 lb  Ideal Body Weight (IBW), Female: 75 lb  % Ideal Body Weight, Female (lb): 159.32 %  BMI (Calculated): 27.8  BMI Grade: 25 - 29.9 - overweight  Usual Body Weight (UBW), k.5 kg (24)  % Usual Body Weight: 101.52  % Weight Change From Usual Weight: 1.31 %   "   Lab/Procedures/Meds  Pertinent Labs Reviewed: reviewed  Pertinent Labs Comments: sodium 148(H), chloride 114(H), MCHC 31.5(L)  Pertinent Medications Reviewed: reviewed  Pertinent Medications Comments: baclofen, ceftriaxone, anoxaparin, pantoprazole    Nutrition Related Social Determinants of Health:  SDOH: Unable to assess at this time.     Estimated/Assessed Needs  Weight Used For Calorie Calculations: 54.2 kg (119 lb 7.8 oz)  Energy Calorie Requirements (kcal): 1288 kcal  Energy Need Method: [place]-St Jeor (x 1.2 AF)  Protein Requirements: 65g (1.2 g/kg)  Weight Used For Protein Calculations: 54.2 kg (119 lb 7.8 oz)   Estimated Fluid Requirement Method: RDA Method  RDA Method (mL): 1288     Nutrition Prescription Ordered  Current Diet Order: NPO  Current Nutrition Support Formula Ordered: Nutren 1.5  Current Nutrition Support Rate Ordered: 20 (ml) (advancing to goal rate of 35mL/hr)  Current Nutrition Support Frequency Ordered: continuous    Evaluation of Received Nutrient/Fluid Intake  Enteral Calories (kcal): 720 (@ 20mL/hr)  Enteral Protein (gm): 32.6 (@ 20mL/hr)  Enteral (Free Water) Fluid (mL): 366.7 (@ 20mL/hr)  I/O: 60/60  Energy Calories Required: not meeting needs  Protein Required: not meeting needs  Fluid Required: not meeting needs  Comments: LBM: 12/3  Tolerance: tolerating  % Intake of Estimated Energy Needs: 50 - 75 %  % Meal Intake: NPO    Nutrition Risk  Level of Risk/Frequency of Follow-up: moderate (1-2 x weekly)     Monitor and Evaluation  Food and Nutrient Intake: enteral nutrition intake  Food and Nutrient Adminstration: enteral and parenteral nutrition administration  Anthropometric Measurements: weight  Biochemical Data, Medical Tests and Procedures: electrolyte and renal panel, gastrointestinal profile  Nutrition-Focused Physical Findings: overall appearance     Nutrition Follow-Up  RD Follow-up?: Yes

## 2024-12-04 NOTE — PLAN OF CARE
Recommendation:  1. Continue pt on TF of Nutren 1.5 advancing to goal rate of 35mL/hr to provide 1260kcal, 57 g protein, 641mL FW with 105mL FWF Q4H or per MD.  2. Monitor weight/labs/residuals for tolerance  3. RD to follow to monitor nutrition status    Goals:  Pt to be advanced to Nutren 1.5 TF goal rate of 35mL/hr by RD follow-up

## 2024-12-04 NOTE — NURSING
[facility]  Nurses Note -- 4 Eyes      12/4/2024       Skin assessed on: Q Shift      [x] No Pressure Injuries Present    []Prevention Measures Documented    [] Yes LDA  for Pressure Injury Previously documented     [] Yes New Pressure Injury Discovered   [] LDA for New Pressure Injury Added      Attending RN:  Yuridia Garsia RN     Second RN:  LELAND Glynn

## 2024-12-04 NOTE — HPI
Alexander Sainz is a 34 y.o. female, with a pertinent PMHx of cerebral palsy, spinal bifida, iron deficiency anemia, and seizure disorder who presents to the ED via EMS from [facility] for decreased responsiveness and bradycardia onset today. Staff at [facility] note a heart rate in the 40-50's BPM, endorses her baseline is at 60-70's BPM and that patient was difficult to arouse. Also noted to be hypothermic. At baseline, patient is awake and responsive but non verbal. Patient's father reports that she has had similar presentations in the past where she becomes hypothermic which leads to her decreased responsiveness and bradycardia    On arrival VS notable for hypothermia to 93 and bradycardia to 52, /68, on RA. CMP notable for elevated sodium to 147, chloride 111, and bicarb 30. CBC with low WBC 2.7 and plts 143, similar to prior. UA with WBCs and bacteria. CXR with basilar atelectasis without focal infiltrate. CT head without acute abnormalities.    On my evaluation patient is awake and mildly agitated. Father reports she has returned to her mental status baseline. Her hypothermia and bradycardia have also improved.

## 2024-12-04 NOTE — H&P
[facility] Medicine  History & Physical    Patient Name: Alexander Sainz  MRN: [id number]  Patient Class: IP- Inpatient  Admission Date: 12/3/2024  Attending Physician: Keisha Pratt MD   Primary Care Provider: Cristóbal Krishnan MD         Patient information was obtained from parent and ER records.     Subjective:     Principal Problem:Hypothermia    Chief Complaint:   Chief Complaint   Patient presents with    Bradycardia     Pt arrived via ems, pt chief complaint is Bradycardia. Pt was sent from [facility] for low heart rate, pt HR is 52 in triage.         HPI: Alexander Sainz is a 34 y.o. female, with a pertinent PMHx of cerebral palsy, spinal bifida, iron deficiency anemia, and seizure disorder who presents to the ED via EMS from [facility] for decreased responsiveness and bradycardia onset today. Staff at [facility] note a heart rate in the 40-50's BPM, endorses her baseline is at 60-70's BPM and that patient was difficult to arouse. Also noted to be hypothermic. At baseline, patient is awake and responsive but non verbal. Patient's father reports that she has had similar presentations in the past where she becomes hypothermic which leads to her decreased responsiveness and bradycardia    On arrival VS notable for hypothermia to 93 and bradycardia to 52, /68, on RA. CMP notable for elevated sodium to 147, chloride 111, and bicarb 30. CBC with low WBC 2.7 and plts 143, similar to prior. UA with WBCs and bacteria. CXR with basilar atelectasis without focal infiltrate. CT head without acute abnormalities.    On my evaluation patient is awake and mildly agitated. Father reports she has returned to her mental status baseline. Her hypothermia and bradycardia have also improved.    Past Medical History:   Diagnosis Date    ADHD (attention deficit hyperactivity disorder)     Blood transfusion     Cerebral palsy     Congenital hip dislocation     Encephalopathy     Infantile myoclonic  epilepsy     Iron deficiency anemia     Mental retardation     Pneumonia, aspiration     Seizures     Spina bifida aperta        Past Surgical History:   Procedure Laterality Date    ABDOMINAL SURGERY      BRAIN SURGERY      GASTROSTOMY      PEG tube    HIP SURGERY      JOINT REPLACEMENT         Review of patient's allergies indicates:  No Known Allergies    No current facility-administered medications on file prior to encounter.     Current Outpatient Medications on File Prior to Encounter   Medication Sig    baclofen (LIORESAL) 10 MG tablet Take 10 mg by mouth 3 (three) times daily.    calcium-vitamin D (CALCIUM-VITAMIN D) 500 mg(1,250mg) -200 unit per tablet Take 1 tablet by mouth 2 (two) times daily with meals.      diazepam 20 mg Kit Place rectally. Use prn for seizures     fluticasone (FLONASE) 50 mcg/actuation nasal spray 1 spray by Each Nare route once daily.    hydroxypropyl methylcellulose (ISOPTO TEARS) 2.5 % ophthalmic solution 1 drop as needed.      lactulose (CHRONULAC) 10 gram/15 mL solution Take by mouth 3 (three) times daily.      levetiracetam (KEPPRA) 100 mg/mL Soln Take 20 mg/kg by mouth 2 (two) times daily.      loratadine (CLARITIN) 10 mg tablet Take 10 mg by mouth once daily.    melatonin 3 mg Tab Take by mouth nightly as needed.      montelukast (SINGULAIR) 10 mg tablet Take 10 mg by mouth every evening.    mv-min-FA-Kn-Dr-hhiyrjz-lutein (MULTIVITAL) 0.4-162-18 mg Tab Take by mouth.      pantoprazole (PROTONIX) 40 MG tablet Take 40 mg by mouth once daily.      trypsin-balsam-castor oil (VASOLEX) 99- unit-mg-mg/gram Oint Apply topically once daily. Apply daily to periwound as directed     Family History    None       Tobacco Use    Smoking status: Never    Smokeless tobacco: Never   Substance and Sexual Activity    Alcohol use: No    Drug use: No    Sexual activity: Never     Review of Systems   Reason unable to perform ROS: due to poor cognitive function.     Objective:     Vital Signs  (Most Recent):  Temp: 96.8 °F (36 °C) (12/03/24 2216)  Pulse: 73 (12/03/24 2216)  Resp: 20 (12/03/24 2216)  BP: (!) 163/114 (12/03/24 2216)  SpO2: 98 % (12/03/24 2216) Vital Signs (24h Range):  Temp:  [93 °F (33.9 °C)-96.8 °F (36 °C)] 96.8 °F (36 °C)  Pulse:  [47-76] 73  Resp:  [12-20] 20  SpO2:  [95 %-99 %] 98 %  BP: ()/() 163/114     Weight: 54.2 kg (119 lb 6.4 oz)  Body mass index is 27.75 kg/m².     Physical Exam  Constitutional:       General: She is not in acute distress.     Appearance: She is not toxic-appearing.   HENT:      Head: Normocephalic and atraumatic.      Mouth/Throat:      Mouth: Mucous membranes are dry.      Pharynx: Oropharynx is clear.   Eyes:      Pupils: Pupils are equal, round, and reactive to light.   Cardiovascular:      Rate and Rhythm: Normal rate and regular rhythm.      Heart sounds: No murmur heard.     No friction rub. No gallop.   Pulmonary:      Effort: Pulmonary effort is normal. No respiratory distress.      Breath sounds: Normal breath sounds. No wheezing or rhonchi.   Abdominal:      General: There is no distension.      Palpations: Abdomen is soft.      Tenderness: There is no abdominal tenderness. There is no guarding or rebound.   Musculoskeletal:      Right lower leg: No edema.      Left lower leg: No edema.   Skin:     General: Skin is warm and dry.      Findings: No rash.   Neurological:      Mental Status: She is alert. Mental status is at baseline.              CRANIAL NERVES     CN III, IV, VI   Pupils are equal, round, and reactive to light.         Recent Results (from the past 24 hours)   ISTAT Lactate    Collection Time: 12/03/24  5:21 PM   Result Value Ref Range    POC Lactate 0.83 0.5 - 2.2 mmol/L    Sample VENOUS     Site Other     Allens Test N/A    Blood culture x two cultures. Draw prior to antibiotics.    Collection Time: 12/03/24  5:26 PM    Specimen: Peripheral, Upper Arm, Right; Blood   Result Value Ref Range    Blood Culture, Routine No  Growth to date    CBC auto differential    Collection Time: 12/03/24  5:26 PM   Result Value Ref Range    WBC 2.76 (L) 3.90 - 12.70 K/uL    RBC 4.66 4.00 - 5.40 M/uL    Hemoglobin 13.4 12.0 - 16.0 g/dL    Hematocrit 42.2 37.0 - 48.5 %    MCV 91 82 - 98 fL    MCH 28.8 27.0 - 31.0 pg    MCHC 31.8 (L) 32.0 - 36.0 g/dL    RDW 12.6 11.5 - 14.5 %    Platelets 143 (L) 150 - 450 K/uL    MPV 12.2 9.2 - 12.9 fL    Immature Granulocytes 0.0 0.0 - 0.5 %    Gran # (ANC) 0.6 (L) 1.8 - 7.7 K/uL    Immature Grans (Abs) 0.00 0.00 - 0.04 K/uL    Lymph # 1.6 1.0 - 4.8 K/uL    Mono # 0.3 0.3 - 1.0 K/uL    Eos # 0.1 0.0 - 0.5 K/uL    Baso # 0.02 0.00 - 0.20 K/uL    nRBC 0 0 /100 WBC    Gran % 22.8 (L) 38.0 - 73.0 %    Lymph % 59.1 (H) 18.0 - 48.0 %    Mono % 12.3 4.0 - 15.0 %    Eosinophil % 5.1 0.0 - 8.0 %    Basophil % 0.7 0.0 - 1.9 %    Differential Method Automated    Comprehensive metabolic panel    Collection Time: 12/03/24  5:26 PM   Result Value Ref Range    Sodium 147 (H) 136 - 145 mmol/L    Potassium 4.5 3.5 - 5.1 mmol/L    Chloride 111 (H) 95 - 110 mmol/L    CO2 30 (H) 23 - 29 mmol/L    Glucose 63 (L) 70 - 110 mg/dL    BUN 22 (H) 6 - 20 mg/dL    Creatinine 0.7 0.5 - 1.4 mg/dL    Calcium 10.0 8.7 - 10.5 mg/dL    Total Protein 7.6 6.0 - 8.4 g/dL    Albumin 3.6 3.5 - 5.2 g/dL    Total Bilirubin 0.2 0.1 - 1.0 mg/dL    Alkaline Phosphatase 79 40 - 150 U/L    AST 20 10 - 40 U/L    ALT 31 10 - 44 U/L    eGFR >60 >60 mL/min/1.73 m^2    Anion Gap 6 (L) 8 - 16 mmol/L   Magnesium    Collection Time: 12/03/24  5:26 PM   Result Value Ref Range    Magnesium 2.0 1.6 - 2.6 mg/dL   Phosphorus    Collection Time: 12/03/24  5:26 PM   Result Value Ref Range    Phosphorus 4.0 2.7 - 4.5 mg/dL   Troponin I    Collection Time: 12/03/24  5:26 PM   Result Value Ref Range    Troponin I <0.006 0.000 - 0.026 ng/mL   Procalcitonin    Collection Time: 12/03/24  5:26 PM   Result Value Ref Range    Procalcitonin <0.02 <0.25 ng/mL   Blood culture x two  cultures. Draw prior to antibiotics.    Collection Time: 12/03/24  5:49 PM    Specimen: Peripheral, Hand, Right; Blood   Result Value Ref Range    Blood Culture, Routine No Growth to date    POCT Influenza A/B Molecular    Collection Time: 12/03/24  7:57 PM   Result Value Ref Range    POC Molecular Influenza A Ag Negative Negative    POC Molecular Influenza B Ag Negative Negative     Acceptable Yes    Urinalysis, Reflex to Urine Culture Urine, Clean Catch    Collection Time: 12/04/24 12:04 AM    Specimen: Urine   Result Value Ref Range    Specimen UA Urine, Clean Catch     Color, UA Yellow Yellow, Straw, Luzmaria    Appearance, UA Hazy (A) Clear    pH, UA 8.0 5.0 - 8.0    Specific Gravity, UA 1.020 1.005 - 1.030    Protein, UA Trace (A) Negative    Glucose, UA Negative Negative    Ketones, UA Negative Negative    Bilirubin (UA) Negative Negative    Occult Blood UA Negative Negative    Nitrite, UA Positive (A) Negative    Urobilinogen, UA Negative <2.0 EU/dL    Leukocytes, UA 3+ (A) Negative   Urinalysis Microscopic    Collection Time: 12/04/24 12:04 AM   Result Value Ref Range    RBC, UA 7 (H) 0 - 4 /hpf    WBC, UA 92 (H) 0 - 5 /hpf    Bacteria Moderate (A) None-Occ /hpf    Squam Epithel, UA 1 /hpf    Triplephos Dawna, UA Occasional None-Moderate    Microscopic Comment SEE COMMENT    POCT glucose    Collection Time: 12/04/24  1:13 AM   Result Value Ref Range    POCT Glucose 75 70 - 110 mg/dL       Microbiology Results (last 7 days)       Procedure Component Value Units Date/Time    Blood culture x two cultures. Draw prior to antibiotics. [[id number]] Collected: 12/03/24 1749    Order Status: Completed Specimen: Blood from Peripheral, Hand, Right Updated: 12/04/24 0312     Blood Culture, Routine No Growth to date    Narrative:      Aerobic and anaerobic    Blood culture x two cultures. Draw prior to antibiotics. [[id number]] Collected: 12/03/24 1726    Order Status: Completed Specimen: Blood from  Peripheral, Upper Arm, Right Updated: 12/04/24 0312     Blood Culture, Routine No Growth to date    Narrative:      Aerobic and anaerobic    Urine culture [[id number]] Collected: 12/04/24 0004    Order Status: No result Specimen: Urine Updated: 12/04/24 0036             Imaging Results              CT Head Without Contrast (Final result)  Result time 12/03/24 19:13:22      Final result by Antonella Graf MD (12/03/24 19:13:22)                   Impression:      No acute intracranial abnormality detected.  No significant change.      Electronically signed by: Antonella Graf  Date:    12/03/2024  Time:    19:13               Narrative:    EXAMINATION:  CT OF THE HEAD WITHOUT    CLINICAL HISTORY:  Bradycardia.Mental status change, unknown cause;    TECHNIQUE:  5 mm unenhanced axial images were obtained from the skull base to the vertex.    COMPARISON:  10/20/2020    FINDINGS:  The ventricles, basal cisterns, and cortical sulci are stable.  Left-sided ventriculomegaly and colpocephaly are again seen.  There is no acute intracranial hemorrhage, territorial infarct or mass effect, or midline shift. The visualized paranasal sinuses and mastoid air cells are clear. Remote bony defects and bony depression are again seen at the anterior frontal sinuses.                                       X-Ray Chest AP Portable (Final result)  Result time 12/03/24 18:19:43      Final result by Antonella Graf MD (12/03/24 18:19:43)                   Impression:      No acute intrathoracic abnormality detected.  Right basilar atelectasis with asymmetric elevation right hemidiaphragm.      Electronically signed by: Antonella Graf  Date:    12/03/2024  Time:    18:19               Narrative:    EXAMINATION:  AP PORTABLE CHEST    CLINICAL HISTORY:  Sepsis;    TECHNIQUE:  AP portable chest radiograph was submitted.    COMPARISON:  10/13/2020    FINDINGS:  AP portable chest radiograph demonstrates a cardiac silhouette within normal  limits.  There is no focal consolidation, pneumothorax, or pleural effusion. There is asymmetric elevation of right hemidiaphragm.  There is right basilar atelectasis.                                     Assessment/Plan:     * Hypothermia  Possibly 2/2 infection vs unwitnessed seizure vs hypothalamic dysfunction which patient's father reports has been a factor in the past.  - Now improved with warm blankets, no need for bairhugger at this time  - continue to monitor  - treat for possible UTI with CTX  - ordered keppra level, resume AEDs      Bradycardia  -now normalized, likely 2/2 hypothermia       Hypernatremia  Hypernatremia is likely due to Dehydration. The patient's most recent sodium results are listed below.  Recent Labs     12/03/24  1726   *     Plan  - Daily Na monitoring  - Increase FWF to 200ml q4 hrs    Seizures  - resume home Keppra  - keppra level ordered  - monitor for seizure activity    Cerebral palsy  Nonverbal, agitated. PEG dependent.  - for Tfs father does not know what she is on, but he will call her facility in AM  - for now start Nutren 1.5 @ 10ml/hr, advance to goal of 35ml/hr  - klonopin prn for agitation  - baclofen TID      VTE Risk Mitigation (From admission, onward)           Ordered     enoxaparin injection 40 mg  Every 24 hours         12/04/24 0332     IP VTE LOW RISK PATIENT  Once         12/03/24 2300     Place sequential compression device  Until discontinued         12/03/24 2300                         Niru Lal MD  Department of Hospital Medicine  [facility] [place] - Telemetry

## 2024-12-04 NOTE — SUBJECTIVE & OBJECTIVE
Past Medical History:   Diagnosis Date    ADHD (attention deficit hyperactivity disorder)     Blood transfusion     Cerebral palsy     Congenital hip dislocation     Encephalopathy     Infantile myoclonic epilepsy     Iron deficiency anemia     Mental retardation     Pneumonia, aspiration     Seizures     Spina bifida aperta        Past Surgical History:   Procedure Laterality Date    ABDOMINAL SURGERY      BRAIN SURGERY      GASTROSTOMY      PEG tube    HIP SURGERY      JOINT REPLACEMENT         Review of patient's allergies indicates:  No Known Allergies    No current facility-administered medications on file prior to encounter.     Current Outpatient Medications on File Prior to Encounter   Medication Sig    baclofen (LIORESAL) 10 MG tablet Take 10 mg by mouth 3 (three) times daily.    calcium-vitamin D (CALCIUM-VITAMIN D) 500 mg(1,250mg) -200 unit per tablet Take 1 tablet by mouth 2 (two) times daily with meals.      diazepam 20 mg Kit Place rectally. Use prn for seizures     fluticasone (FLONASE) 50 mcg/actuation nasal spray 1 spray by Each Nare route once daily.    hydroxypropyl methylcellulose (ISOPTO TEARS) 2.5 % ophthalmic solution 1 drop as needed.      lactulose (CHRONULAC) 10 gram/15 mL solution Take by mouth 3 (three) times daily.      levetiracetam (KEPPRA) 100 mg/mL Soln Take 20 mg/kg by mouth 2 (two) times daily.      loratadine (CLARITIN) 10 mg tablet Take 10 mg by mouth once daily.    melatonin 3 mg Tab Take by mouth nightly as needed.      montelukast (SINGULAIR) 10 mg tablet Take 10 mg by mouth every evening.    mv-min-FA-Th-Bw-gqjuqvv-lutein (MULTIVITAL) 0.4-162-18 mg Tab Take by mouth.      pantoprazole (PROTONIX) 40 MG tablet Take 40 mg by mouth once daily.      trypsin-balsam-castor oil (VASOLEX) 90- unit-mg-mg/gram Oint Apply topically once daily. Apply daily to periwound as directed     Family History    None       Tobacco Use    Smoking status: Never    Smokeless tobacco: Never    Substance and Sexual Activity    Alcohol use: No    Drug use: No    Sexual activity: Never     Review of Systems   Reason unable to perform ROS: due to poor cognitive function.     Objective:     Vital Signs (Most Recent):  Temp: 96.8 °F (36 °C) (12/03/24 2216)  Pulse: 73 (12/03/24 2216)  Resp: 20 (12/03/24 2216)  BP: (!) 163/114 (12/03/24 2216)  SpO2: 98 % (12/03/24 2216) Vital Signs (24h Range):  Temp:  [93 °F (33.9 °C)-96.8 °F (36 °C)] 96.8 °F (36 °C)  Pulse:  [47-76] 73  Resp:  [12-20] 20  SpO2:  [95 %-99 %] 98 %  BP: ()/() 163/114     Weight: 54.2 kg (119 lb 6.4 oz)  Body mass index is 27.75 kg/m².     Physical Exam  Constitutional:       General: She is not in acute distress.     Appearance: She is not toxic-appearing.   HENT:      Head: Normocephalic and atraumatic.      Mouth/Throat:      Mouth: Mucous membranes are dry.      Pharynx: Oropharynx is clear.   Eyes:      Pupils: Pupils are equal, round, and reactive to light.   Cardiovascular:      Rate and Rhythm: Normal rate and regular rhythm.      Heart sounds: No murmur heard.     No friction rub. No gallop.   Pulmonary:      Effort: Pulmonary effort is normal. No respiratory distress.      Breath sounds: Normal breath sounds. No wheezing or rhonchi.   Abdominal:      General: There is no distension.      Palpations: Abdomen is soft.      Tenderness: There is no abdominal tenderness. There is no guarding or rebound.   Musculoskeletal:      Right lower leg: No edema.      Left lower leg: No edema.   Skin:     General: Skin is warm and dry.      Findings: No rash.   Neurological:      Mental Status: She is alert. Mental status is at baseline.              CRANIAL NERVES     CN III, IV, VI   Pupils are equal, round, and reactive to light.         Recent Results (from the past 24 hours)   ISTAT Lactate    Collection Time: 12/03/24  5:21 PM   Result Value Ref Range    POC Lactate 0.83 0.5 - 2.2 mmol/L    Sample VENOUS     Site Robby Mcgraw  Test N/A    Blood culture x two cultures. Draw prior to antibiotics.    Collection Time: 12/03/24  5:26 PM    Specimen: Peripheral, Upper Arm, Right; Blood   Result Value Ref Range    Blood Culture, Routine No Growth to date    CBC auto differential    Collection Time: 12/03/24  5:26 PM   Result Value Ref Range    WBC 2.76 (L) 3.90 - 12.70 K/uL    RBC 4.66 4.00 - 5.40 M/uL    Hemoglobin 13.4 12.0 - 16.0 g/dL    Hematocrit 42.2 37.0 - 48.5 %    MCV 91 82 - 98 fL    MCH 28.8 27.0 - 31.0 pg    MCHC 31.8 (L) 32.0 - 36.0 g/dL    RDW 12.6 11.5 - 14.5 %    Platelets 143 (L) 150 - 450 K/uL    MPV 12.2 9.2 - 12.9 fL    Immature Granulocytes 0.0 0.0 - 0.5 %    Gran # (ANC) 0.6 (L) 1.8 - 7.7 K/uL    Immature Grans (Abs) 0.00 0.00 - 0.04 K/uL    Lymph # 1.6 1.0 - 4.8 K/uL    Mono # 0.3 0.3 - 1.0 K/uL    Eos # 0.1 0.0 - 0.5 K/uL    Baso # 0.02 0.00 - 0.20 K/uL    nRBC 0 0 /100 WBC    Gran % 22.8 (L) 38.0 - 73.0 %    Lymph % 59.1 (H) 18.0 - 48.0 %    Mono % 12.3 4.0 - 15.0 %    Eosinophil % 5.1 0.0 - 8.0 %    Basophil % 0.7 0.0 - 1.9 %    Differential Method Automated    Comprehensive metabolic panel    Collection Time: 12/03/24  5:26 PM   Result Value Ref Range    Sodium 147 (H) 136 - 145 mmol/L    Potassium 4.5 3.5 - 5.1 mmol/L    Chloride 111 (H) 95 - 110 mmol/L    CO2 30 (H) 23 - 29 mmol/L    Glucose 63 (L) 70 - 110 mg/dL    BUN 22 (H) 6 - 20 mg/dL    Creatinine 0.7 0.5 - 1.4 mg/dL    Calcium 10.0 8.7 - 10.5 mg/dL    Total Protein 7.6 6.0 - 8.4 g/dL    Albumin 3.6 3.5 - 5.2 g/dL    Total Bilirubin 0.2 0.1 - 1.0 mg/dL    Alkaline Phosphatase 79 40 - 150 U/L    AST 20 10 - 40 U/L    ALT 31 10 - 44 U/L    eGFR >60 >60 mL/min/1.73 m^2    Anion Gap 6 (L) 8 - 16 mmol/L   Magnesium    Collection Time: 12/03/24  5:26 PM   Result Value Ref Range    Magnesium 2.0 1.6 - 2.6 mg/dL   Phosphorus    Collection Time: 12/03/24  5:26 PM   Result Value Ref Range    Phosphorus 4.0 2.7 - 4.5 mg/dL   Troponin I    Collection Time: 12/03/24  5:26  PM   Result Value Ref Range    Troponin I <0.006 0.000 - 0.026 ng/mL   Procalcitonin    Collection Time: 12/03/24  5:26 PM   Result Value Ref Range    Procalcitonin <0.02 <0.25 ng/mL   Blood culture x two cultures. Draw prior to antibiotics.    Collection Time: 12/03/24  5:49 PM    Specimen: Peripheral, Hand, Right; Blood   Result Value Ref Range    Blood Culture, Routine No Growth to date    POCT Influenza A/B Molecular    Collection Time: 12/03/24  7:57 PM   Result Value Ref Range    POC Molecular Influenza A Ag Negative Negative    POC Molecular Influenza B Ag Negative Negative     Acceptable Yes    Urinalysis, Reflex to Urine Culture Urine, Clean Catch    Collection Time: 12/04/24 12:04 AM    Specimen: Urine   Result Value Ref Range    Specimen UA Urine, Clean Catch     Color, UA Yellow Yellow, Straw, Luzmaria    Appearance, UA Hazy (A) Clear    pH, UA 8.0 5.0 - 8.0    Specific Gravity, UA 1.020 1.005 - 1.030    Protein, UA Trace (A) Negative    Glucose, UA Negative Negative    Ketones, UA Negative Negative    Bilirubin (UA) Negative Negative    Occult Blood UA Negative Negative    Nitrite, UA Positive (A) Negative    Urobilinogen, UA Negative <2.0 EU/dL    Leukocytes, UA 3+ (A) Negative   Urinalysis Microscopic    Collection Time: 12/04/24 12:04 AM   Result Value Ref Range    RBC, UA 7 (H) 0 - 4 /hpf    WBC, UA 92 (H) 0 - 5 /hpf    Bacteria Moderate (A) None-Occ /hpf    Squam Epithel, UA 1 /hpf    Triplephos Dawna, UA Occasional None-Moderate    Microscopic Comment SEE COMMENT    POCT glucose    Collection Time: 12/04/24  1:13 AM   Result Value Ref Range    POCT Glucose 75 70 - 110 mg/dL       Microbiology Results (last 7 days)       Procedure Component Value Units Date/Time    Blood culture x two cultures. Draw prior to antibiotics. [[id number]] Collected: 12/03/24 1749    Order Status: Completed Specimen: Blood from Peripheral, Hand, Right Updated: 12/04/24 0312     Blood Culture, Routine No Growth  to date    Narrative:      Aerobic and anaerobic    Blood culture x two cultures. Draw prior to antibiotics. [[id number]] Collected: 12/03/24 1726    Order Status: Completed Specimen: Blood from Peripheral, Upper Arm, Right Updated: 12/04/24 0312     Blood Culture, Routine No Growth to date    Narrative:      Aerobic and anaerobic    Urine culture [[id number]] Collected: 12/04/24 0004    Order Status: No result Specimen: Urine Updated: 12/04/24 0036             Imaging Results              CT Head Without Contrast (Final result)  Result time 12/03/24 19:13:22      Final result by Antonella Graf MD (12/03/24 19:13:22)                   Impression:      No acute intracranial abnormality detected.  No significant change.      Electronically signed by: Antonella Graf  Date:    12/03/2024  Time:    19:13               Narrative:    EXAMINATION:  CT OF THE HEAD WITHOUT    CLINICAL HISTORY:  Bradycardia.Mental status change, unknown cause;    TECHNIQUE:  5 mm unenhanced axial images were obtained from the skull base to the vertex.    COMPARISON:  10/20/2020    FINDINGS:  The ventricles, basal cisterns, and cortical sulci are stable.  Left-sided ventriculomegaly and colpocephaly are again seen.  There is no acute intracranial hemorrhage, territorial infarct or mass effect, or midline shift. The visualized paranasal sinuses and mastoid air cells are clear. Remote bony defects and bony depression are again seen at the anterior frontal sinuses.                                       X-Ray Chest AP Portable (Final result)  Result time 12/03/24 18:19:43      Final result by Antonella Graf MD (12/03/24 18:19:43)                   Impression:      No acute intrathoracic abnormality detected.  Right basilar atelectasis with asymmetric elevation right hemidiaphragm.      Electronically signed by: Antonella Graf  Date:    12/03/2024  Time:    18:19               Narrative:    EXAMINATION:  AP PORTABLE CHEST    CLINICAL  HISTORY:  Sepsis;    TECHNIQUE:  AP portable chest radiograph was submitted.    COMPARISON:  10/13/2020    FINDINGS:  AP portable chest radiograph demonstrates a cardiac silhouette within normal limits.  There is no focal consolidation, pneumothorax, or pleural effusion. There is asymmetric elevation of right hemidiaphragm.  There is right basilar atelectasis.

## 2024-12-04 NOTE — NURSING
Feeding tube started at 10ml/hr after flushing of 30ml of water. Patient tolerated well.  Plan of care ongoing.

## 2024-12-04 NOTE — CARE UPDATE
Patient seen and examined. Maintaining adequate temperature (98.6 - 99.1 F) and HR back at baseline. Placed in soft restraints per father's request as he states patient often pulls lines when left alone. UA with evidence of infection, will continue to treat as possible cause of hypothermia.       Urine Cultures presumptively positive for Proteus. Continue IV Rocephin   Soft restraints per family request  Blood cultures NGTD  Continuous telemetry. Q4H vitals  Seizure precautions    Darryl Nagel PA-C

## 2024-12-04 NOTE — PLAN OF CARE
Problem: Adult Inpatient Plan of Care  Goal: Plan of Care Review  Outcome: Progressing  Flowsheets (Taken 12/4/2024 0341)  Plan of Care Reviewed With: parent     Problem: Skin Injury Risk Increased  Goal: Skin Health and Integrity  Outcome: Progressing  Intervention: Optimize Skin Protection  Flowsheets (Taken 12/4/2024 0341)  Pressure Reduction Techniques: weight shift assistance provided  Pressure Reduction Devices:   positioning supports utilized   pressure-redistributing mattress utilized  Skin Protection: incontinence pads utilized  Head of Bed (HOB) Positioning: HOB elevated     Patient is Alert and Awake but nonverbal. On room air. Tele maintained. Feeding tube ongoing, tolerated well with no signs of discomfort. Kept HOB elevated. No falls or injuries reported during shift, safety precautions maintained.

## 2024-12-04 NOTE — ASSESSMENT & PLAN NOTE
Nonverbal, agitated. PEG dependent.  - for Tfs father does not know what she is on, but he will call her facility in AM  - for now start Nutren 1.5 @ 10ml/hr, advance to goal of 35ml/hr  - klonopin prn for agitation  - baclofen TID

## 2024-12-04 NOTE — ASSESSMENT & PLAN NOTE
Possibly 2/2 infection vs unwitnessed seizure vs hypothalamic dysfunction which patient's father reports has been a factor in the past.  - Now improved with warm blankets, no need for bairhugger at this time  - continue to monitor  - treat for possible UTI with CTX  - ordered keppra level, resume AEDs

## 2024-12-04 NOTE — ED NOTES
Assumed care of Pt at this time. Pt remains on bear-hugger and continuous rectal thermometer at 43C d/t hypothermia, w/ improvement in core temperature since arrival. Father to remain at bedside, updated on POC to be admitted. No questions/concerns at this time, call light within reach.

## 2024-12-04 NOTE — NURSING
[facility] [place]  Nurses Note -- 4 Eyes      12/3/2024       Skin assessed on: Admit      [x] No Pressure Injuries Present    [x]Prevention Measures Documented    [] Yes LDA  for Pressure Injury Previously documented     [] Yes New Pressure Injury Discovered   [] LDA for New Pressure Injury Added      Attending RN:  Renard Reyes RN     Second RN:  Natty Youngblood RN

## 2024-12-04 NOTE — PLAN OF CARE
Case Management Assessment     PCP: Cristóbal Krishnan MD    Pharmacy:       Patient Arrived From: [facility]  Existing Help at Home: facility staff    Barriers to Discharge: None identified at this time    Discharge Plan:    A. Return to [facility]   B. Return to [facility]    CM spoke to pt's father over the phone for dc planning assessment. Pt is a resident at [facility] and family intends for her to return there on discharge. Pt is dependent with ADLs. She was receiving tube feeds prior to admission and is wheelchair bound. Pt will need transportation assistance on discharge. CM will continue to follow.     12/04/24 1434   Discharge Assessment   Assessment Type Discharge Planning Assessment   Confirmed/corrected address, phone number and insurance Yes   Confirmed Demographics Correct on Facesheet   Source of Information family   Communicated GALE with patient/caregiver Date not available/Unable to determine   People in Home facility resident   Facility Arrived From: [facility]   Do you expect to return to your current living situation? Yes   Do you have help at home or someone to help you manage your care at home? Yes   Who are your caregiver(s) and their phone number(s)? Facility staff   Walking or Climbing Stairs Difficulty yes   Walking or Climbing Stairs ambulation difficulty, dependent   Mobility Management Wheelchair   Dressing/Bathing Difficulty yes   Dressing/Bathing bathing difficulty, dependent;dressing difficulty, dependent   Dressing/Bathing Management facility staff assists   Equipment Currently Used at Home wheelchair;other (see comments)  (tube feeding supplies)   Readmission within 30 days? No   Patient currently being followed by outpatient case management? No   Do you take prescription medications? Yes   Do you have prescription coverage? Yes   Coverage [place] Medicaid   Do you have any problems affording any of your prescribed medications? No   Is the patient taking medications as prescribed?  yes   Who is going to help you get home at discharge? wheelchair van or stretcher   How do you get to doctors appointments? health plan transportation   Are you on dialysis? No   Do you take coumadin? No   Discharge Plan A Other  (Return to [facility])   Discharge Plan B Other  (Return to [facility])   DME Needed Upon Discharge  other (see comments)  (TBD)   Discharge Plan discussed with: Parent(s)   Name(s) and Number(s) Renard Sainz [phone]   Transition of Care Barriers None

## 2024-12-04 NOTE — ASSESSMENT & PLAN NOTE
Hypernatremia is likely due to Dehydration. The patient's most recent sodium results are listed below.  Recent Labs     12/03/24  1726   *     Plan  - Daily Na monitoring  - Increase FWF to 200ml q4 hrs

## 2024-12-04 NOTE — NURSING
Patient arrived to the unit via stretcher accompanied by a transporter, father at the bedside, on room air and no apparent distress noted. Got situated on  bed. Put bed in lowest position, with HOB elevated. Side rails raised, instructed to call for assistance.Vital signs taken. Four eyes completed.

## 2024-12-04 NOTE — ED TRIAGE NOTES
Pt from [facility] with complaints of hypotension, bradycardic in 40's, hypothermic. Pt nonverbal, hx of cerebral palsy. Family at bedside.

## 2024-12-05 LAB
ALBUMIN SERPL BCP-MCNC: 3.1 G/DL (ref 3.5–5.2)
ALP SERPL-CCNC: 72 U/L (ref 40–150)
ALT SERPL W/O P-5'-P-CCNC: 27 U/L (ref 10–44)
ANION GAP SERPL CALC-SCNC: 7 MMOL/L (ref 8–16)
AST SERPL-CCNC: 20 U/L (ref 10–40)
BACTERIA UR CULT: ABNORMAL
BASOPHILS # BLD AUTO: 0.02 K/UL (ref 0–0.2)
BASOPHILS NFR BLD: 0.5 % (ref 0–1.9)
BILIRUB SERPL-MCNC: 0.2 MG/DL (ref 0.1–1)
BUN SERPL-MCNC: 16 MG/DL (ref 6–20)
CALCIUM SERPL-MCNC: 9.3 MG/DL (ref 8.7–10.5)
CHLORIDE SERPL-SCNC: 115 MMOL/L (ref 95–110)
CO2 SERPL-SCNC: 26 MMOL/L (ref 23–29)
CREAT SERPL-MCNC: 0.6 MG/DL (ref 0.5–1.4)
DIFFERENTIAL METHOD BLD: ABNORMAL
EOSINOPHIL # BLD AUTO: 0.2 K/UL (ref 0–0.5)
EOSINOPHIL NFR BLD: 4.7 % (ref 0–8)
ERYTHROCYTE [DISTWIDTH] IN BLOOD BY AUTOMATED COUNT: 12.3 % (ref 11.5–14.5)
EST. GFR  (NO RACE VARIABLE): >60 ML/MIN/1.73 M^2
GLUCOSE SERPL-MCNC: 86 MG/DL (ref 70–110)
HCT VFR BLD AUTO: 41.6 % (ref 37–48.5)
HGB BLD-MCNC: 12.9 G/DL (ref 12–16)
IMM GRANULOCYTES # BLD AUTO: 0 K/UL (ref 0–0.04)
IMM GRANULOCYTES NFR BLD AUTO: 0 % (ref 0–0.5)
LYMPHOCYTES # BLD AUTO: 2.2 K/UL (ref 1–4.8)
LYMPHOCYTES NFR BLD: 57.1 % (ref 18–48)
MCH RBC QN AUTO: 28.1 PG (ref 27–31)
MCHC RBC AUTO-ENTMCNC: 31 G/DL (ref 32–36)
MCV RBC AUTO: 91 FL (ref 82–98)
MONOCYTES # BLD AUTO: 0.4 K/UL (ref 0.3–1)
MONOCYTES NFR BLD: 11.3 % (ref 4–15)
NEUTROPHILS # BLD AUTO: 1 K/UL (ref 1.8–7.7)
NEUTROPHILS NFR BLD: 26.4 % (ref 38–73)
NRBC BLD-RTO: 0 /100 WBC
PLATELET # BLD AUTO: 177 K/UL (ref 150–450)
PMV BLD AUTO: 12 FL (ref 9.2–12.9)
POCT GLUCOSE: 73 MG/DL (ref 70–110)
POCT GLUCOSE: 89 MG/DL (ref 70–110)
POCT GLUCOSE: 96 MG/DL (ref 70–110)
POTASSIUM SERPL-SCNC: 4.2 MMOL/L (ref 3.5–5.1)
PROT SERPL-MCNC: 7 G/DL (ref 6–8.4)
RBC # BLD AUTO: 4.59 M/UL (ref 4–5.4)
SODIUM SERPL-SCNC: 148 MMOL/L (ref 136–145)
WBC # BLD AUTO: 3.82 K/UL (ref 3.9–12.7)

## 2024-12-05 PROCEDURE — 25000003 PHARM REV CODE 250

## 2024-12-05 PROCEDURE — 36415 COLL VENOUS BLD VENIPUNCTURE: CPT

## 2024-12-05 PROCEDURE — 63600175 PHARM REV CODE 636 W HCPCS: Mod: JZ,JG | Performed by: INTERNAL MEDICINE

## 2024-12-05 PROCEDURE — 80053 COMPREHEN METABOLIC PANEL: CPT

## 2024-12-05 PROCEDURE — 63600175 PHARM REV CODE 636 W HCPCS: Performed by: STUDENT IN AN ORGANIZED HEALTH CARE EDUCATION/TRAINING PROGRAM

## 2024-12-05 PROCEDURE — 11000001 HC ACUTE MED/SURG PRIVATE ROOM

## 2024-12-05 PROCEDURE — 63600175 PHARM REV CODE 636 W HCPCS

## 2024-12-05 PROCEDURE — 25500020 PHARM REV CODE 255

## 2024-12-05 PROCEDURE — 25000003 PHARM REV CODE 250: Performed by: STUDENT IN AN ORGANIZED HEALTH CARE EDUCATION/TRAINING PROGRAM

## 2024-12-05 PROCEDURE — 99223 1ST HOSP IP/OBS HIGH 75: CPT | Mod: ,,, | Performed by: SURGERY

## 2024-12-05 PROCEDURE — 85025 COMPLETE CBC W/AUTO DIFF WBC: CPT

## 2024-12-05 PROCEDURE — S5010 5% DEXTROSE AND 0.45% SALINE: HCPCS

## 2024-12-05 RX ORDER — DEXTROSE MONOHYDRATE AND SODIUM CHLORIDE 5; .45 G/100ML; G/100ML
INJECTION, SOLUTION INTRAVENOUS CONTINUOUS
Status: DISCONTINUED | OUTPATIENT
Start: 2024-12-05 | End: 2024-12-06 | Stop reason: HOSPADM

## 2024-12-05 RX ORDER — DIATRIZOATE MEGLUMINE AND DIATRIZOATE SODIUM 660; 100 MG/ML; MG/ML
60 SOLUTION ORAL; RECTAL
Status: COMPLETED | OUTPATIENT
Start: 2024-12-05 | End: 2024-12-05

## 2024-12-05 RX ORDER — CEFTRIAXONE 1 G/1
1 INJECTION, POWDER, FOR SOLUTION INTRAMUSCULAR; INTRAVENOUS ONCE
Status: COMPLETED | OUTPATIENT
Start: 2024-12-05 | End: 2024-12-05

## 2024-12-05 RX ORDER — SODIUM CHLORIDE 9 MG/ML
INJECTION, SOLUTION INTRAVENOUS CONTINUOUS
Status: DISCONTINUED | OUTPATIENT
Start: 2024-12-05 | End: 2024-12-05

## 2024-12-05 RX ORDER — CEFTRIAXONE 2 G/1
2 INJECTION, POWDER, FOR SOLUTION INTRAMUSCULAR; INTRAVENOUS
Status: DISCONTINUED | OUTPATIENT
Start: 2024-12-06 | End: 2024-12-06 | Stop reason: HOSPADM

## 2024-12-05 RX ADMIN — CEFTRIAXONE 1 G: 1 INJECTION, POWDER, FOR SOLUTION INTRAMUSCULAR; INTRAVENOUS at 05:12

## 2024-12-05 RX ADMIN — LEVETIRACETAM 1080 MG: 100 SOLUTION ORAL at 09:12

## 2024-12-05 RX ADMIN — PANTOPRAZOLE SODIUM 40 MG: 40 INJECTION, POWDER, LYOPHILIZED, FOR SOLUTION INTRAVENOUS at 10:12

## 2024-12-05 RX ADMIN — BACLOFEN 10 MG: 10 TABLET ORAL at 10:12

## 2024-12-05 RX ADMIN — DEXTROSE AND SODIUM CHLORIDE: 5; 450 INJECTION, SOLUTION INTRAVENOUS at 04:12

## 2024-12-05 RX ADMIN — BACLOFEN 10 MG: 10 TABLET ORAL at 03:12

## 2024-12-05 RX ADMIN — LEVETIRACETAM 1080 MG: 100 SOLUTION ORAL at 10:12

## 2024-12-05 RX ADMIN — ENOXAPARIN SODIUM 40 MG: 40 INJECTION SUBCUTANEOUS at 05:12

## 2024-12-05 RX ADMIN — DIATRIZOATE MEGLUMINE AND DIATRIZOATE SODIUM 60 ML: 600; 100 SOLUTION ORAL; RECTAL at 01:12

## 2024-12-05 RX ADMIN — CEFTRIAXONE 1 G: 1 INJECTION, POWDER, FOR SOLUTION INTRAMUSCULAR; INTRAVENOUS at 06:12

## 2024-12-05 RX ADMIN — SODIUM CHLORIDE: 0.9 INJECTION, SOLUTION INTRAVENOUS at 12:12

## 2024-12-05 RX ADMIN — SODIUM CHLORIDE 250 ML: 0.9 INJECTION, SOLUTION INTRAVENOUS at 09:12

## 2024-12-05 RX ADMIN — MONTELUKAST 10 MG: 10 TABLET, FILM COATED ORAL at 10:12

## 2024-12-05 NOTE — PROGRESS NOTES
Rt wrist restraint discontinued as pt resting well, not pulling on line. Pt is very drowsy after a dose of Zyprexa but responds to painful stimulus.  Family at bed side. Pt cleansed, new perwick applied. Free water 200 ml given after residual check.

## 2024-12-05 NOTE — ASSESSMENT & PLAN NOTE
Hypernatremia is likely due to Dehydration. The patient's most recent sodium results are listed below.  Recent Labs     12/03/24  1726 12/04/24  0532 12/05/24  0831   * 148* 148*       Plan  - Daily Na monitoring  - D5 NS @ 75 mL/hr

## 2024-12-05 NOTE — PLAN OF CARE
Problem: Adult Inpatient Plan of Care  Goal: Plan of Care Review  Outcome: Progressing  Goal: Patient-Specific Goal (Individualized)  Outcome: Progressing  Goal: Absence of Hospital-Acquired Illness or Injury  Outcome: Progressing  Goal: Optimal Comfort and Wellbeing  Outcome: Progressing  Goal: Readiness for Transition of Care  Outcome: Progressing     Problem: Skin Injury Risk Increased  Goal: Skin Health and Integrity  Outcome: Progressing     Problem: Fall Injury Risk  Goal: Absence of Fall and Fall-Related Injury  Outcome: Progressing     Problem: Restraint, Nonviolent  Goal: Absence of Harm or Injury  Outcome: Progressing

## 2024-12-05 NOTE — PLAN OF CARE
General surgery alerted that G tube fell out again. At bedside G tube balloon was tested and demonstrated no leak. G tube was replaced at bedside. Contrast study demonstrated good placement. Tube ok for tube feeds. General surgery to sign off at this time.    Maxi Negro MD  [facility]

## 2024-12-05 NOTE — PROGRESS NOTES
[facility]  Nurses Note -- 4 Eyes      12/5/2024       Skin assessed on: Q Shift      [x] No Pressure Injuries Present    [x]Prevention Measures Documented    [] Yes LDA  for Pressure Injury Previously documented     [] Yes New Pressure Injury Discovered   [] LDA for New Pressure Injury Added      Attending RN:  Mala Stubbs RN     Second RN:  Yuridia Garsia RN

## 2024-12-05 NOTE — PROGRESS NOTES
[facility] Medicine  Progress Note    Patient Name: Alexander Sainz  MRN: [id number]  Patient Class: IP- Inpatient   Admission Date: 12/3/2024  Length of Stay: 2 days  Attending Physician: Keisha Pratt MD  Primary Care Provider: Cristóbal Krishnan MD        Subjective     Principal Problem:Hypothermia        HPI:  Alexander Sainz is a 34 y.o. female, with a pertinent PMHx of cerebral palsy, spinal bifida, iron deficiency anemia, and seizure disorder who presents to the ED via EMS from [facility] for decreased responsiveness and bradycardia onset today. Staff at [facility] note a heart rate in the 40-50's BPM, endorses her baseline is at 60-70's BPM and that patient was difficult to arouse. Also noted to be hypothermic. At baseline, patient is awake and responsive but non verbal. Patient's father reports that she has had similar presentations in the past where she becomes hypothermic which leads to her decreased responsiveness and bradycardia    On arrival VS notable for hypothermia to 93 and bradycardia to 52, /68, on RA. CMP notable for elevated sodium to 147, chloride 111, and bicarb 30. CBC with low WBC 2.7 and plts 143, similar to prior. UA with WBCs and bacteria. CXR with basilar atelectasis without focal infiltrate. CT head without acute abnormalities.    On my evaluation patient is awake and mildly agitated. Father reports she has returned to her mental status baseline. Her hypothermia and bradycardia have also improved.    Overview/Hospital Course:  No notes on file    Interval History: Patient seen and examined. Peg tube dislodged overnight. Replaced and correct location confirmed. Pulled again later in the afternoon and again replaced and confirmed. Feeds resumed. Temperature dropped to 94.3F and patient bradycardic and hypotensive. Given IVF bolus and bear hugger ordered. Continuous IVFs ordered.     Review of Systems   Unable to perform ROS: Patient nonverbal     Objective:      Vital Signs (Most Recent):  Temp: (!) 94.3 °F (34.6 °C) (12/05/24 1530)  Pulse: (!) 48 (12/05/24 1457)  Resp: 18 (12/05/24 1115)  BP: (!) 84/61 (12/05/24 1115)  SpO2: 96 % (12/05/24 1115) Vital Signs (24h Range):  Temp:  [94.3 °F (34.6 °C)-98.3 °F (36.8 °C)] 94.3 °F (34.6 °C)  Pulse:  [48-74] 48  Resp:  [18-19] 18  SpO2:  [94 %-98 %] 96 %  BP: ()/(53-71) 84/61     Weight: 54.2 kg (119 lb 7.8 oz)  Body mass index is 27.77 kg/m².    Intake/Output Summary (Last 24 hours) at 12/5/2024 1541  Last data filed at 12/5/2024 0500  Gross per 24 hour   Intake 600 ml   Output 650 ml   Net -50 ml         Physical Exam  Vitals and nursing note reviewed.   HENT:      Head: Normocephalic.      Mouth/Throat:      Mouth: Mucous membranes are moist.      Pharynx: Oropharynx is clear.   Eyes:      Conjunctiva/sclera: Conjunctivae normal.   Cardiovascular:      Rate and Rhythm: Regular rhythm. Bradycardia present.      Pulses: Normal pulses.      Heart sounds: Normal heart sounds.   Pulmonary:      Effort: Pulmonary effort is normal. No respiratory distress.      Breath sounds: Normal breath sounds. No wheezing or rales.   Abdominal:      General: Bowel sounds are normal. There is no distension.      Palpations: Abdomen is soft.      Tenderness: There is no abdominal tenderness. There is no guarding.   Skin:     General: Skin is warm and dry.   Neurological:      Mental Status: She is alert.             Significant Labs: All pertinent labs within the past 24 hours have been reviewed.    Significant Imaging: I have reviewed all pertinent imaging results/findings within the past 24 hours.    Assessment and Plan     * Hypothermia  Possibly 2/2 infection vs unwitnessed seizure vs hypothalamic dysfunction which patient's father reports has been a factor in the past.  - Now improved with warm blankets, no need for bairhugger at this time  - continue to monitor  - treat for possible UTI with CTX 2g daily  - ordered keppra level,  resume AEDs      Bradycardia  -Patient intermittently bradycardic, likely 2/2 hypothermia   - Bear hugger ordered      Hypernatremia  Hypernatremia is likely due to Dehydration. The patient's most recent sodium results are listed below.  Recent Labs     12/03/24  1726 12/04/24  0532 12/05/24  0831   * 148* 148*       Plan  - Daily Na monitoring  - D5 NS @ 75 mL/hr    Seizures  - resume home Keppra  - keppra level ordered  - monitor for seizure activity    Cerebral palsy  Nonverbal, agitated. PEG dependent.  - for Tfs father does not know what she is on, but he will call her facility in AM  - for now start Nutren 1.5 @ 10ml/hr, advance to goal of 35ml/hr  - klonopin prn for agitation  - baclofen TID      VTE Risk Mitigation (From admission, onward)           Ordered     enoxaparin injection 40 mg  Every 24 hours         12/04/24 0332     IP VTE LOW RISK PATIENT  Once         12/03/24 2300     Place sequential compression device  Until discontinued         12/03/24 2300                    Discharge Planning   GALE:      Code Status: Full Code   Medical Readiness for Discharge Date:   Discharge Plan A: Other (Return to [facility])                        Darryl Nagel PA-C  Department of Hospital Medicine   [facility] - Telemetry

## 2024-12-05 NOTE — CONSULTS
IR consulted for G tube replacement.     G tube initially placed ~ 10 years ago at OSH; patient with dislodged G tube overnight 12/4 into 12/5 which was replaced by General Surgery am of 12/5. Pt pulled G tube out again after replacement.     General Surgery again replaced G tube on afternoon of 12/5. F/u imaging shows appropriate placement of G tube.     IR will sign off.   If patient pulls out G tube again, recommend discussing with General Surgery as they are already following the patient for this issue during this admission.       Diamond Haq NP  Interventional Radiology

## 2024-12-05 NOTE — PROGRESS NOTES
Bedside Xray completed, awaiting results.  Pt hyperactive and pulling on everything. Primary Nurse unable to give PRN clonazepam as her PEG tube is out.   Dr Hernandez requested for some iv meds to calm down pt.   One time dose of Olanzapine 5 mg IM given as per the provider order.  Pt reassessed, sleeping well in bed, father at bedside. Restraint continuous at rt wrist. No sign of injury noted. Mepilex applied to heels.  Plan of care ongoing.

## 2024-12-05 NOTE — ASSESSMENT & PLAN NOTE
Possibly 2/2 infection vs unwitnessed seizure vs hypothalamic dysfunction which patient's father reports has been a factor in the past.  - Now improved with warm blankets, no need for bairhugger at this time  - continue to monitor  - treat for possible UTI with CTX 2g daily  - ordered keppra level, resume AEDs

## 2024-12-05 NOTE — SUBJECTIVE & OBJECTIVE
Interval History: Patient seen and examined. Peg tube dislodged overnight. Replaced and correct location confirmed. Pulled again later in the afternoon and again replaced and confirmed. Feeds resumed. Temperature dropped to 94.3F and patient bradycardic and hypotensive. Given IVF bolus and bear hugger ordered. Continuous IVFs ordered.     Review of Systems   Unable to perform ROS: Patient nonverbal     Objective:     Vital Signs (Most Recent):  Temp: (!) 94.3 °F (34.6 °C) (12/05/24 1530)  Pulse: (!) 48 (12/05/24 1457)  Resp: 18 (12/05/24 1115)  BP: (!) 84/61 (12/05/24 1115)  SpO2: 96 % (12/05/24 1115) Vital Signs (24h Range):  Temp:  [94.3 °F (34.6 °C)-98.3 °F (36.8 °C)] 94.3 °F (34.6 °C)  Pulse:  [48-74] 48  Resp:  [18-19] 18  SpO2:  [94 %-98 %] 96 %  BP: ()/(53-71) 84/61     Weight: 54.2 kg (119 lb 7.8 oz)  Body mass index is 27.77 kg/m².    Intake/Output Summary (Last 24 hours) at 12/5/2024 1541  Last data filed at 12/5/2024 0500  Gross per 24 hour   Intake 600 ml   Output 650 ml   Net -50 ml         Physical Exam  Vitals and nursing note reviewed.   HENT:      Head: Normocephalic.      Mouth/Throat:      Mouth: Mucous membranes are moist.      Pharynx: Oropharynx is clear.   Eyes:      Conjunctiva/sclera: Conjunctivae normal.   Cardiovascular:      Rate and Rhythm: Regular rhythm. Bradycardia present.      Pulses: Normal pulses.      Heart sounds: Normal heart sounds.   Pulmonary:      Effort: Pulmonary effort is normal. No respiratory distress.      Breath sounds: Normal breath sounds. No wheezing or rales.   Abdominal:      General: Bowel sounds are normal. There is no distension.      Palpations: Abdomen is soft.      Tenderness: There is no abdominal tenderness. There is no guarding.   Skin:     General: Skin is warm and dry.   Neurological:      Mental Status: She is alert.             Significant Labs: All pertinent labs within the past 24 hours have been reviewed.    Significant Imaging: I have  reviewed all pertinent imaging results/findings within the past 24 hours.

## 2024-12-05 NOTE — NURSING
[facility]  Nurses Note -- 4 Eyes      12/5/2024       Skin assessed on: Q Shift      [x] No Pressure Injuries Present    [x]Prevention Measures Documented    [] Yes LDA  for Pressure Injury Previously documented     [] Yes New Pressure Injury Discovered   [] LDA for New Pressure Injury Added      Attending RN:  Stephanie Walker LPN     Second RN:  Mala HA

## 2024-12-05 NOTE — PROGRESS NOTES
"Tube feeding restarted at 35 ml/hr as per Dr Almanzar order, "X-ray looks good. ok to resume using the tube".  200 ml free water given as per the standing order. Pt tolerated the procedure well.  Pt resting well on bed.   Plan of care ongoing.  "

## 2024-12-05 NOTE — CONSULTS
Consult Note    SUBJECTIVE:     History of Present Illness:  Patient is a 34 y.o. female presents with PMHx of cerebral palsy, spinal bifida, iron deficiency anemia, and seizure disorder who presented w/ hypothermia and bradycardia. G tube was found to be out. Patient had tube placed 10 years ago and it was replaced at bedside. Contrast study demonstrated tube in good place without extravasation.    Patient examined and interviewed with guardian at bedside. Patient at baseline per guardian. Abdomen soft, non-tender. G t ube in place with tube feeds running.    Chief Complaint   Patient presents with    Bradycardia     Pt arrived via ems, pt chief complaint is Bradycardia. Pt was sent from [facility] for low heart rate, pt HR is 52 in triage.        Review of patient's allergies indicates:  No Known Allergies    Current Facility-Administered Medications   Medication Dose Route Frequency Provider Last Rate Last Admin    acetaminophen tablet 650 mg  650 mg Per G Tube Q8H PRN Niru Lal MD        acetaminophen tablet 650 mg  650 mg Per G Tube Q4H PRN Niru Lal MD        aluminum-magnesium hydroxide-simethicone 200-200-20 mg/5 mL suspension 30 mL  30 mL Oral QID PRN Niru Lal MD        baclofen tablet 10 mg  10 mg Oral TID Niru Lal MD   10 mg at 12/04/24 2131    cefTRIAXone injection 1 g  1 g Intravenous Q24H Niru Lal MD   1 g at 12/05/24 0503    clonazePAM disintegrating tablet 0.25 mg  0.25 mg Per G Tube Q8H PRN Niru Lal MD        enoxaparin injection 40 mg  40 mg Subcutaneous Q24H (prophylaxis, 1700) Niru Lal MD   40 mg at 12/04/24 1732    glucagon (human recombinant) injection 1 mg  1 mg Intramuscular PRN Niru Lal MD        glucose chewable tablet 16 g  16 g Oral PRN Niru Lal MD        glucose chewable tablet 24 g  24 g Oral PRN Niru Lal MD        levetiracetam 500 mg/5 mL (5 mL) liquid Soln 1,080 mg  20 mg/kg Per G Tube BID Niru Lal MD   1,080 mg at  "12/04/24 0854    melatonin tablet 6 mg  6 mg Oral Nightly PRN Niru Lal MD   6 mg at 12/04/24 2135    montelukast tablet 10 mg  10 mg Per G Tube QHS Niru Lal MD   10 mg at 12/04/24 2132    ondansetron injection 4 mg  4 mg Intravenous Q8H PRN Niru Lal MD        pantoprazole injection 40 mg  40 mg Intravenous Daily Darryl Nagel PA-C   40 mg at 12/04/24 0853    polyethylene glycol packet 17 g  17 g Per G Tube Daily PRN Niru Lal MD        prochlorperazine injection Soln 5 mg  5 mg Intravenous Q6H PRN Niru Lal MD        simethicone chewable tablet 80 mg  1 tablet Oral QID PRN Niru Lal MD        sodium chloride 0.9% flush 3 mL  3 mL Intravenous Q12H PRN Niru Lal MD           Past Medical History:   Diagnosis Date    ADHD (attention deficit hyperactivity disorder)     Blood transfusion     Cerebral palsy     Congenital hip dislocation     Encephalopathy     Infantile myoclonic epilepsy     Iron deficiency anemia     Mental retardation     Pneumonia, aspiration     Seizures     Spina bifida aperta      Past Surgical History:   Procedure Laterality Date    ABDOMINAL SURGERY      BRAIN SURGERY      GASTROSTOMY      PEG tube    HIP SURGERY      JOINT REPLACEMENT       No family history on file.  Social History     Tobacco Use    Smoking status: Never    Smokeless tobacco: Never   Substance Use Topics    Alcohol use: No    Drug use: No        Review of Systems:  Review of Systems    Negative except as noted in HPI  OBJECTIVE:     Vital Signs (Most Recent)  Temp: 97.7 °F (36.5 °C) (12/05/24 0430)  Pulse: (!) 55 (12/05/24 0718)  Resp: 18 (12/05/24 0454)  BP: (!) 90/54 (12/05/24 0454)  SpO2: (!) 94 % (12/05/24 0454)  4' 7" (1.397 m)  54.2 kg (119 lb 7.8 oz)     Physical Exam:  Physical Exam  Constitutional:       General: She is not in acute distress.     Appearance: She is ill-appearing.      Comments: Chronically ill appearing   HENT:      Head: Normocephalic and atraumatic. "   Cardiovascular:      Rate and Rhythm: Normal rate.   Pulmonary:      Effort: Pulmonary effort is normal. No respiratory distress.   Abdominal:      General: Abdomen is flat.      Palpations: Abdomen is soft.      Comments: G tube in place   Skin:     Capillary Refill: Capillary refill takes less than 2 seconds.   Neurological:      General: No focal deficit present.      Mental Status: She is oriented to person, place, and time.           Laboratory  Labs reviewed, WBC normal    Significant Diagnostic Studies:   Tube study w/ with contrast in the lumen, no extravasation    ASSESSMENT/PLAN:   Ms. Sainz is a chronically ill 34 F w/ hx cerebral palsy and G tube x10 years. G tube fell out and was replaced at bedside. Tube study demonstrated G tube in good position. Ok to use.    PLAN:  G tube replaced, appears to be in good position and good condition, confirmed clinically and radiographically. Ok to use.  General surgery will sign off at this time.    Maxi Negro MD  [facility]

## 2024-12-05 NOTE — NURSING
Pt noted to have dislodged Peg tube. Dr. Almanzar notified. Provider stated ok to replace at bedside, confirm with abdominal X-ray. Provider notified Peg tube are not stocked on unit. Attempted to place coleman to keep stoma open. Unsuccessful. House supervisor notified replacement peg tube is needed. Successfully replaced a 16 F peg tube to abdomen. Pt tolerated well. Abdominal Xray ordered. Pt pending general surgery consult in AM

## 2024-12-05 NOTE — PROGRESS NOTES
Walking rounds completed on pt, on assessment pt resting well on bed, NAD noted. family at bedside mentioned a doctor came by and paused on a feeding tube, not sure who it was and why it was paused. Feeding on pause until it is confirmed. Day shift nurse Stephanie at bedside aware about situation.  Plan of care ongoing.

## 2024-12-05 NOTE — PLAN OF CARE
Problem: Restraint, Nonviolent  Goal: Absence of Harm or Injury  Outcome: Progressing  Intervention: Implement Least Restrictive Safety Strategies  Flowsheets (Taken 12/4/2024 1812)  Medical Device Protection:   torso covered   tubing secured  Less Restrictive Alternative:   bed alarm in use   calming techniques promoted   familiar comfort object encouraged   sensory stimulation provided  De-Escalation Techniques: increased round frequency  Intervention: Protect Dignity, Rights and Personal Wellbeing  Flowsheets (Taken 12/4/2024 1812)  Trust Relationship/Rapport: care explained

## 2024-12-06 VITALS
TEMPERATURE: 97 F | HEART RATE: 51 BPM | SYSTOLIC BLOOD PRESSURE: 109 MMHG | RESPIRATION RATE: 17 BRPM | WEIGHT: 119.5 LBS | HEIGHT: 55 IN | DIASTOLIC BLOOD PRESSURE: 71 MMHG | BODY MASS INDEX: 27.65 KG/M2 | OXYGEN SATURATION: 97 %

## 2024-12-06 LAB
ALBUMIN SERPL BCP-MCNC: 3.1 G/DL (ref 3.5–5.2)
ALP SERPL-CCNC: 67 U/L (ref 40–150)
ALT SERPL W/O P-5'-P-CCNC: 24 U/L (ref 10–44)
ANION GAP SERPL CALC-SCNC: 7 MMOL/L (ref 8–16)
AST SERPL-CCNC: 20 U/L (ref 10–40)
BASOPHILS # BLD AUTO: 0.02 K/UL (ref 0–0.2)
BASOPHILS NFR BLD: 0.5 % (ref 0–1.9)
BILIRUB SERPL-MCNC: 0.2 MG/DL (ref 0.1–1)
BUN SERPL-MCNC: 14 MG/DL (ref 6–20)
CALCIUM SERPL-MCNC: 8.9 MG/DL (ref 8.7–10.5)
CHLORIDE SERPL-SCNC: 116 MMOL/L (ref 95–110)
CO2 SERPL-SCNC: 25 MMOL/L (ref 23–29)
CREAT SERPL-MCNC: 0.6 MG/DL (ref 0.5–1.4)
DIFFERENTIAL METHOD BLD: NORMAL
EOSINOPHIL # BLD AUTO: 0.1 K/UL (ref 0–0.5)
EOSINOPHIL NFR BLD: 2.1 % (ref 0–8)
ERYTHROCYTE [DISTWIDTH] IN BLOOD BY AUTOMATED COUNT: 12.1 % (ref 11.5–14.5)
EST. GFR  (NO RACE VARIABLE): >60 ML/MIN/1.73 M^2
GLUCOSE SERPL-MCNC: 97 MG/DL (ref 70–110)
HCT VFR BLD AUTO: 38.4 % (ref 37–48.5)
HGB BLD-MCNC: 12.7 G/DL (ref 12–16)
IMM GRANULOCYTES # BLD AUTO: 0.02 K/UL (ref 0–0.04)
IMM GRANULOCYTES NFR BLD AUTO: 0.5 % (ref 0–0.5)
LEVETIRACETAM SERPL-MCNC: 26.5 UG/ML (ref 3–60)
LYMPHOCYTES # BLD AUTO: 1.2 K/UL (ref 1–4.8)
LYMPHOCYTES NFR BLD: 27.6 % (ref 18–48)
MCH RBC QN AUTO: 29.7 PG (ref 27–31)
MCHC RBC AUTO-ENTMCNC: 33.1 G/DL (ref 32–36)
MCV RBC AUTO: 90 FL (ref 82–98)
MONOCYTES # BLD AUTO: 0.4 K/UL (ref 0.3–1)
MONOCYTES NFR BLD: 8.1 % (ref 4–15)
NEUTROPHILS # BLD AUTO: 2.6 K/UL (ref 1.8–7.7)
NEUTROPHILS NFR BLD: 61.2 % (ref 38–73)
NRBC BLD-RTO: 0 /100 WBC
PLATELET # BLD AUTO: 166 K/UL (ref 150–450)
PMV BLD AUTO: 11.7 FL (ref 9.2–12.9)
POCT GLUCOSE: 94 MG/DL (ref 70–110)
POCT GLUCOSE: 95 MG/DL (ref 70–110)
POTASSIUM SERPL-SCNC: 3.9 MMOL/L (ref 3.5–5.1)
PROT SERPL-MCNC: 6.6 G/DL (ref 6–8.4)
RBC # BLD AUTO: 4.28 M/UL (ref 4–5.4)
SODIUM SERPL-SCNC: 148 MMOL/L (ref 136–145)
WBC # BLD AUTO: 4.31 K/UL (ref 3.9–12.7)

## 2024-12-06 PROCEDURE — 36415 COLL VENOUS BLD VENIPUNCTURE: CPT

## 2024-12-06 PROCEDURE — 25000003 PHARM REV CODE 250

## 2024-12-06 PROCEDURE — 80053 COMPREHEN METABOLIC PANEL: CPT

## 2024-12-06 PROCEDURE — S5010 5% DEXTROSE AND 0.45% SALINE: HCPCS

## 2024-12-06 PROCEDURE — 63600175 PHARM REV CODE 636 W HCPCS

## 2024-12-06 PROCEDURE — 85025 COMPLETE CBC W/AUTO DIFF WBC: CPT

## 2024-12-06 PROCEDURE — 25000003 PHARM REV CODE 250: Performed by: STUDENT IN AN ORGANIZED HEALTH CARE EDUCATION/TRAINING PROGRAM

## 2024-12-06 RX ORDER — SULFAMETHOXAZOLE AND TRIMETHOPRIM 200; 40 MG/5ML; MG/5ML
20 SUSPENSION ORAL EVERY 12 HOURS
Qty: 280 ML | Refills: 0 | Status: SHIPPED | OUTPATIENT
Start: 2024-12-06 | End: 2024-12-06

## 2024-12-06 RX ORDER — SULFAMETHOXAZOLE AND TRIMETHOPRIM 200; 40 MG/5ML; MG/5ML
20 SUSPENSION ORAL EVERY 12 HOURS
Qty: 280 ML | Refills: 0 | Status: SHIPPED | OUTPATIENT
Start: 2024-12-06 | End: 2024-12-13

## 2024-12-06 RX ORDER — MIDODRINE HYDROCHLORIDE 5 MG/1
10 TABLET ORAL ONCE
Status: COMPLETED | OUTPATIENT
Start: 2024-12-06 | End: 2024-12-06

## 2024-12-06 RX ADMIN — BACLOFEN 10 MG: 10 TABLET ORAL at 02:12

## 2024-12-06 RX ADMIN — CEFTRIAXONE 2 G: 2 INJECTION, POWDER, FOR SOLUTION INTRAMUSCULAR; INTRAVENOUS at 09:12

## 2024-12-06 RX ADMIN — PANTOPRAZOLE SODIUM 40 MG: 40 INJECTION, POWDER, LYOPHILIZED, FOR SOLUTION INTRAVENOUS at 09:12

## 2024-12-06 RX ADMIN — MIDODRINE HYDROCHLORIDE 10 MG: 5 TABLET ORAL at 02:12

## 2024-12-06 RX ADMIN — BACLOFEN 10 MG: 10 TABLET ORAL at 09:12

## 2024-12-06 RX ADMIN — LEVETIRACETAM 1080 MG: 100 SOLUTION ORAL at 10:12

## 2024-12-06 RX ADMIN — DEXTROSE AND SODIUM CHLORIDE 1000 ML: 5; 900 INJECTION, SOLUTION INTRAVENOUS at 02:12

## 2024-12-06 RX ADMIN — DEXTROSE AND SODIUM CHLORIDE: 5; 450 INJECTION, SOLUTION INTRAVENOUS at 04:12

## 2024-12-06 NOTE — NURSING
Patient has the bear hug and is beginning to wake up. Her tube feeding is going and so is her IV fluids. Dad is at the bedside and has been there all day. Her bed is in the lowest position and all safety needs are being met.

## 2024-12-06 NOTE — NURSING
[facility] [place]  Nurses Note -- 4 Eyes      12/6/2024       Skin assessed on: Q Shift      [x] No Pressure Injuries Present    [x]Prevention Measures Documented    [] Yes LDA  for Pressure Injury Previously documented     [] Yes New Pressure Injury Discovered   [] LDA for New Pressure Injury Added      Attending RN:  Stephanie Walker LPN     Second RN:  Mala HA

## 2024-12-06 NOTE — PROGRESS NOTES
[facility] [place]  Nurses Note -- 4 Eyes      12/6/2024       Skin assessed on: Q Shift      [x] No Pressure Injuries Present    [x]Prevention Measures Documented    [] Yes LDA  for Pressure Injury Previously documented     [] Yes New Pressure Injury Discovered   [] LDA for New Pressure Injury Added      Attending RN:  Mala Stubbs RN     Second RN:  KVNG Brown

## 2024-12-06 NOTE — PLAN OF CARE
Case Management Final Discharge Note      Discharge Disposition: Return to [facility]    New DME ordered / company name: None    Relevant SDOH / Transition of Care Barriers:  None identified at this time    Primary Caretaker and contact information: Renard Sainz [phone]    Scheduled followup appointment: pt will follow up with provider at the facility    Referrals placed: None    Transportation: stretcher requested for 1700    Family educated on discharge services and updated on DC plan. Bedside RN notified, patient clear to discharge from Case Management Perspective.      12/06/24 1344   Final Note   Assessment Type Final Discharge Note   Anticipated Discharge Disposition [facility]   Hospital Resources/Appts/Education Provided Provided patient/caregiver with written discharge plan information   Post-Acute Status   Coverage [place] Medicaid   Discharge Delays None known at this time

## 2024-12-06 NOTE — HOSPITAL COURSE
Patient admitted for management of hypotension and bradycardia. Completed sepsis work-up. Blood cultures NGTD. UA positive for signs of infection. Urine culture positive for Proteus Mirabilis and pansensitive. Started on IV Rocephin for treatment of UTI. Unknown if symptoms related to hypothalamic dysfunction or infection. Temperature improved with bearhugger and patient able to maintain temp for approximately 48 hours, however, temp decreased to 94F during admission and patient became bradycardic. Patient again placed on bearhugger with improvement of temperature to normal range. During admission, patient was placed on soft restraints, per family request for prevention of line pullling when away from bedside. Restraints allowed to  after 24 hours. Patient dislodged PEG tube 2 times while inpatient. Tubes replaced and CXRs ordered to confirm correct location x2. Feeds restarted without complication. Will discharge on 7 days of Bactrim. Prior to discharge, patient's , 97.7F, and HR 53. EKG NSR with 1st degree block. Patient intermittently hypotensive and bradycardic throughout stay, despite maintaining normal temperature. Asymptomatic without indications of distress. Patient is hemodynamically stable for discharge.

## 2024-12-06 NOTE — NURSING
Report was called to BRAULIO and the nurse is aware that the patient is coming back. I explained to her about the new medicines and the patients current status. We will continue to monitor.

## 2024-12-06 NOTE — DISCHARGE SUMMARY
[facility]  Discharge Summary      Patient Name: Alexander Sainz  MRN: [id number]  [facility]: [id number]  Patient Class: IP- Inpatient  Admission Date: 12/3/2024  Hospital Length of Stay: 3 days  Discharge Date and Time:  12/06/2024 1:13 PM  Attending Physician: Keisha Pratt MD   Discharging Provider: Darryl Nagel PA-C  Primary Care Provider: Cristóbal Krishnan MD    Primary Care Team:  [facility]    HPI:   Alexander Sainz is a 34 y.o. female, with a pertinent PMHx of cerebral palsy, spinal bifida, iron deficiency anemia, and seizure disorder who presents to the ED via EMS from [facility] for decreased responsiveness and bradycardia onset today. Staff at [facility] note a heart rate in the 40-50's BPM, endorses her baseline is at 60-70's BPM and that patient was difficult to arouse. Also noted to be hypothermic. At baseline, patient is awake and responsive but non verbal. Patient's father reports that she has had similar presentations in the past where she becomes hypothermic which leads to her decreased responsiveness and bradycardia    On arrival VS notable for hypothermia to 93 and bradycardia to 52, /68, on RA. CMP notable for elevated sodium to 147, chloride 111, and bicarb 30. CBC with low WBC 2.7 and plts 143, similar to prior. UA with WBCs and bacteria. CXR with basilar atelectasis without focal infiltrate. CT head without acute abnormalities.    On my evaluation patient is awake and mildly agitated. Father reports she has returned to her mental status baseline. Her hypothermia and bradycardia have also improved.    * No surgery found *      Hospital Course:   Patient admitted for management of hypotension and bradycardia. Completed sepsis work-up. Blood cultures NGTD. UA positive for signs of infection. Urine culture positive for Proteus Mirabilis and pansensitive. Started on IV Rocephin for treatment of UTI. Unknown if symptoms related to hypothalamic dysfunction  or infection. Temperature improved with bearhugger and patient able to maintain temp for approximately 48 hours, however, temp decreased to 94F during admission and patient became bradycardic. Patient again placed on bearhugger with improvement of temperature to normal range. During admission, patient was placed on soft restraints, per family request for prevention of line pullling when away from bedside. Restraints allowed to  after 24 hours. Patient dislodged PEG tube 2 times while inpatient. Tubes replaced and CXRs ordered to confirm correct location x2. Feeds restarted without complication. Will discharge on 7 days of Bactrim. Prior to discharge, patient's , 97.7F, and HR 53. EKG NSR with 1st degree block. Patient intermittently hypotensive and bradycardic throughout stay, despite maintaining normal temperature. Asymptomatic without indications of distress. Patient is hemodynamically stable for discharge.      Goals of Care Treatment Preferences:  Code Status: Full Code      SDOH Screening:  The patient was unable to be screened for utility difficulties, food insecurity, transport difficulties, housing insecurity, and interpersonal safety, so no concerns could be identified this admission.     Consults:   Consults (From admission, onward)          Status Ordering Provider     Inpatient consult to Interventional Radiology  Once        Provider:  Jennifer Del Rio MD    Completed TORRIE MALIN     Inpatient consult to General Surgery  Once        Provider:  Maxi Negro MD    Completed DOMITILA RODRIGUEZ            Bradycardia  -Patient intermittently bradycardic, likely 2/2 hypothermia   - Bear hugger ordered      Hypernatremia  Hypernatremia is likely due to Dehydration. The patient's most recent sodium results are listed below.  Recent Labs     24  1726 24  0532 24  0831   * 148* 148*       Plan  - Daily Na monitoring  - D5 NS @ 75 mL/hr    Seizures  - resume home Keppra  -  keppra level ordered  - monitor for seizure activity    Cerebral palsy  Nonverbal, agitated. PEG dependent.  - for Tfs father does not know what she is on, but he will call her facility in AM  - for now start Nutren 1.5 @ 10ml/hr, advance to goal of 35ml/hr  - klonopin prn for agitation  - baclofen TID      Final Active Diagnoses:    Diagnosis Date Noted POA    Bradycardia [R00.1] 12/04/2024 Yes    Hypernatremia [E87.0] 10/11/2020 Yes    Seizures [R56.9]  Yes    Cerebral palsy [G80.9]  Yes      Problems Resolved During this Admission:    Diagnosis Date Noted Date Resolved POA    PRINCIPAL PROBLEM:  Hypothermia [T68.XXXA] 06/06/2015 12/06/2024 Yes       Discharged Condition: stable    Disposition: Home or Self Care      Patient Instructions:      Notify your health care provider if you experience any of the following:  temperature >100.4     Notify your health care provider if you experience any of the following:  persistent nausea and vomiting or diarrhea     Notify your health care provider if you experience any of the following:  difficulty breathing or increased cough     Notify your health care provider if you experience any of the following:  persistent dizziness, light-headedness, or visual disturbances     Notify your health care provider if you experience any of the following:  increased confusion or weakness     Activity as tolerated       Significant Diagnostic Studies: N/A    Pending Diagnostic Studies:       None           Medications:  Reconciled Home Medications:      Medication List        START taking these medications      sulfamethoxazole-trimethoprim 200-40 mg/5 ml 200-40 mg/5 mL Susp  Commonly known as: BACTRIM,SEPTRA  20 mLs by Per G Tube route every 12 (twelve) hours. for 7 days            CONTINUE taking these medications      baclofen 10 MG tablet  Commonly known as: LIORESAL  Take 10 mg by mouth 3 (three) times daily.     CALCIUM 500 + D 500 mg-5 mcg (200 unit) per tablet  Generic drug:  calcium-vitamin D3  Take 1 tablet by mouth 2 (two) times daily with meals.     diazePAM 20 mg Kit  Place rectally. Use prn for seizures     fluticasone propionate 50 mcg/actuation nasal spray  Commonly known as: FLONASE  1 spray by Each Nare route once daily.     hypromellose 2.5 % ophthalmic demulcent solution  Commonly known as: GONIOVISC  1 drop as needed.     lactulose 10 gram/15 mL solution  Commonly known as: CHRONULAC  Take by mouth 3 (three) times daily.     levETIRAcetam 100 mg/mL Soln  Commonly known as: KEPPRA  Take 20 mg/kg by mouth 2 (two) times daily.     loratadine 10 mg tablet  Commonly known as: CLARITIN  Take 10 mg by mouth once daily.     melatonin 3 mg tablet  Commonly known as: MELATIN  Take by mouth nightly as needed.     montelukast 10 mg tablet  Commonly known as: SINGULAIR  Take 10 mg by mouth every evening.     MULTIVITAL 0.4-162-18 mg Tab  Generic drug: mv-min-FA-Lv-Ez-fhkmylp-lutein  Take by mouth.     pantoprazole 40 MG tablet  Commonly known as: PROTONIX  Take 40 mg by mouth once daily.     VASOLEX 90- unit-mg-mg/gram Oint  Generic drug: trypsin-balsam-castor oil  Apply topically once daily. Apply daily to periwound as directed              Indwelling Lines/Drains at time of discharge:   Lines/Drains/Airways       Drain  Duration                  Gastrostomy/Enterostomy Gastrostomy tube w/ balloon LUQ -- days                    Time spent on the discharge of patient: 37 minutes         Darryl Nagel PA-C  Department of Hospital Medicine  [facility] - Telemetry

## 2024-12-07 LAB
BACTERIA BLD CULT: NORMAL
BACTERIA BLD CULT: NORMAL

## 2024-12-09 ENCOUNTER — HOSPITAL ENCOUNTER (EMERGENCY)
Facility: HOSPITAL | Age: 34
Discharge: HOME | End: 2024-12-10
Attending: STUDENT IN AN ORGANIZED HEALTH CARE EDUCATION/TRAINING PROGRAM
Payer: MEDICAID

## 2024-12-09 DIAGNOSIS — R11.2 NAUSEA AND VOMITING, UNSPECIFIED VOMITING TYPE: Primary | ICD-10-CM

## 2024-12-09 DIAGNOSIS — E87.0 CHRONIC HYPERNATREMIA: ICD-10-CM

## 2024-12-09 DIAGNOSIS — E16.2 HYPOGLYCEMIA: ICD-10-CM

## 2024-12-09 LAB
ALBUMIN SERPL BCP-MCNC: 3.9 G/DL (ref 3.5–5.2)
ALP SERPL-CCNC: 74 U/L (ref 40–150)
ALT SERPL W/O P-5'-P-CCNC: 35 U/L (ref 10–44)
ANION GAP SERPL CALC-SCNC: 10 MMOL/L (ref 8–16)
AST SERPL-CCNC: 23 U/L (ref 10–40)
BASOPHILS # BLD AUTO: 0.02 K/UL (ref 0–0.2)
BASOPHILS NFR BLD: 0.3 % (ref 0–1.9)
BILIRUB SERPL-MCNC: 0.2 MG/DL (ref 0.1–1)
BUN SERPL-MCNC: 19 MG/DL (ref 6–20)
CALCIUM SERPL-MCNC: 9.8 MG/DL (ref 8.7–10.5)
CHLORIDE SERPL-SCNC: 119 MMOL/L (ref 95–110)
CO2 SERPL-SCNC: 22 MMOL/L (ref 23–29)
CREAT SERPL-MCNC: 0.8 MG/DL (ref 0.5–1.4)
DIFFERENTIAL METHOD BLD: NORMAL
EOSINOPHIL # BLD AUTO: 0.1 K/UL (ref 0–0.5)
EOSINOPHIL NFR BLD: 1.7 % (ref 0–8)
ERYTHROCYTE [DISTWIDTH] IN BLOOD BY AUTOMATED COUNT: 12.1 % (ref 11.5–14.5)
EST. GFR  (NO RACE VARIABLE): >60 ML/MIN/1.73 M^2
GLUCOSE SERPL-MCNC: 165 MG/DL (ref 70–110)
GLUCOSE SERPL-MCNC: 80 MG/DL (ref 70–110)
HCT VFR BLD AUTO: 44.1 % (ref 37–48.5)
HGB BLD-MCNC: 14.6 G/DL (ref 12–16)
IMM GRANULOCYTES # BLD AUTO: 0.01 K/UL (ref 0–0.04)
IMM GRANULOCYTES NFR BLD AUTO: 0.2 % (ref 0–0.5)
LIPASE SERPL-CCNC: 46 U/L (ref 4–60)
LYMPHOCYTES # BLD AUTO: 2.1 K/UL (ref 1–4.8)
LYMPHOCYTES NFR BLD: 33 % (ref 18–48)
MAGNESIUM SERPL-MCNC: 2.2 MG/DL (ref 1.6–2.6)
MCH RBC QN AUTO: 29.4 PG (ref 27–31)
MCHC RBC AUTO-ENTMCNC: 33.1 G/DL (ref 32–36)
MCV RBC AUTO: 89 FL (ref 82–98)
MONOCYTES # BLD AUTO: 0.7 K/UL (ref 0.3–1)
MONOCYTES NFR BLD: 11.5 % (ref 4–15)
NEUTROPHILS # BLD AUTO: 3.4 K/UL (ref 1.8–7.7)
NEUTROPHILS NFR BLD: 53.3 % (ref 38–73)
NRBC BLD-RTO: 0 /100 WBC
PLATELET # BLD AUTO: 181 K/UL (ref 150–450)
PMV BLD AUTO: 12.2 FL (ref 9.2–12.9)
POCT GLUCOSE: 55 MG/DL (ref 70–110)
POTASSIUM SERPL-SCNC: 4.2 MMOL/L (ref 3.5–5.1)
PROT SERPL-MCNC: 7.9 G/DL (ref 6–8.4)
RBC # BLD AUTO: 4.97 M/UL (ref 4–5.4)
SODIUM SERPL-SCNC: 151 MMOL/L (ref 136–145)
WBC # BLD AUTO: 6.34 K/UL (ref 3.9–12.7)

## 2024-12-09 PROCEDURE — 83690 ASSAY OF LIPASE: CPT | Performed by: STUDENT IN AN ORGANIZED HEALTH CARE EDUCATION/TRAINING PROGRAM

## 2024-12-09 PROCEDURE — 80053 COMPREHEN METABOLIC PANEL: CPT | Performed by: STUDENT IN AN ORGANIZED HEALTH CARE EDUCATION/TRAINING PROGRAM

## 2024-12-09 PROCEDURE — 83735 ASSAY OF MAGNESIUM: CPT | Performed by: STUDENT IN AN ORGANIZED HEALTH CARE EDUCATION/TRAINING PROGRAM

## 2024-12-09 PROCEDURE — 96361 HYDRATE IV INFUSION ADD-ON: CPT

## 2024-12-09 PROCEDURE — 99285 EMERGENCY DEPT VISIT HI MDM: CPT | Mod: 25

## 2024-12-09 PROCEDURE — 85025 COMPLETE CBC W/AUTO DIFF WBC: CPT | Performed by: STUDENT IN AN ORGANIZED HEALTH CARE EDUCATION/TRAINING PROGRAM

## 2024-12-09 PROCEDURE — 25000003 PHARM REV CODE 250: Performed by: STUDENT IN AN ORGANIZED HEALTH CARE EDUCATION/TRAINING PROGRAM

## 2024-12-09 PROCEDURE — 25500020 PHARM REV CODE 255: Performed by: STUDENT IN AN ORGANIZED HEALTH CARE EDUCATION/TRAINING PROGRAM

## 2024-12-09 PROCEDURE — 82962 GLUCOSE BLOOD TEST: CPT

## 2024-12-09 PROCEDURE — 96365 THER/PROPH/DIAG IV INF INIT: CPT

## 2024-12-09 PROCEDURE — 63600175 PHARM REV CODE 636 W HCPCS: Performed by: STUDENT IN AN ORGANIZED HEALTH CARE EDUCATION/TRAINING PROGRAM

## 2024-12-09 PROCEDURE — 96375 TX/PRO/DX INJ NEW DRUG ADDON: CPT

## 2024-12-09 RX ORDER — DIATRIZOATE MEGLUMINE AND DIATRIZOATE SODIUM 660; 100 MG/ML; MG/ML
30 SOLUTION ORAL; RECTAL
Status: COMPLETED | OUTPATIENT
Start: 2024-12-09 | End: 2024-12-09

## 2024-12-09 RX ORDER — ONDANSETRON HYDROCHLORIDE 2 MG/ML
4 INJECTION, SOLUTION INTRAVENOUS
Status: COMPLETED | OUTPATIENT
Start: 2024-12-09 | End: 2024-12-09

## 2024-12-09 RX ADMIN — ONDANSETRON 4 MG: 2 INJECTION INTRAMUSCULAR; INTRAVENOUS at 06:12

## 2024-12-09 RX ADMIN — SODIUM CHLORIDE 1000 ML: 9 INJECTION, SOLUTION INTRAVENOUS at 09:12

## 2024-12-09 RX ADMIN — DIATRIZOATE MEGLUMINE AND DIATRIZOATE SODIUM 30 ML: 600; 100 SOLUTION ORAL; RECTAL at 06:12

## 2024-12-09 RX ADMIN — DEXTROSE MONOHYDRATE 250 ML: 100 INJECTION, SOLUTION INTRAVENOUS at 06:12

## 2024-12-09 NOTE — ED PROVIDER NOTES
Encounter Date: 12/9/2024    SCRIBE #1 NOTE: I, Eldon Salmeron, am scribing for, and in the presence of,  Alexis Guzman MD. I have scribed the following portions of the note - Other sections scribed: hpi,ros,pe.       History     Chief Complaint   Patient presents with    Vomiting     Pt arrived via EMS for vomiting x2 days w/ lethargy.     34 y.o. female with a PMHx of ADHD, mental retardation, cerebral palsy, epilepsy, encephalopathy, and seizures, who presents to the ED for chief complaint of vomiting onset 2 days ago. Father, independent historian, reports [facility] reports associated lethargy. Reports patient is not tolerating PO intake. Reports patient has a peg tube in place. Notes patient wears diapers. Denies tube in head. Denies adherence to insulin. No other alleviating or exacerbating factors noted. Denies fever, cough, or any other associated symptoms.      The history is provided by a parent (father). No  was used.     Review of patient's allergies indicates:  No Known Allergies  Past Medical History:   Diagnosis Date    ADHD (attention deficit hyperactivity disorder)     Blood transfusion     Cerebral palsy     Congenital hip dislocation     Encephalopathy     Infantile myoclonic epilepsy     Iron deficiency anemia     Mental retardation     Pneumonia, aspiration     Seizures     Spina bifida aperta      Past Surgical History:   Procedure Laterality Date    ABDOMINAL SURGERY      BRAIN SURGERY      GASTROSTOMY      PEG tube    HIP SURGERY      JOINT REPLACEMENT       No family history on file.  Social History     Tobacco Use    Smoking status: Never    Smokeless tobacco: Never   Substance Use Topics    Alcohol use: No    Drug use: No     Review of Systems   Constitutional:  Negative for chills and fever.   HENT:  Negative for facial swelling and sore throat.    Eyes:  Negative for visual disturbance.   Respiratory:  Negative for cough and shortness of breath.     Cardiovascular:  Negative for chest pain and palpitations.   Gastrointestinal:  Positive for vomiting. Negative for abdominal pain and nausea.   Genitourinary:  Negative for dysuria and hematuria.   Musculoskeletal:  Negative for back pain.   Skin:  Negative for rash.   Neurological:  Negative for weakness and headaches.   Hematological:  Does not bruise/bleed easily.   Psychiatric/Behavioral: Negative.         Physical Exam     Initial Vitals   BP Pulse Resp Temp SpO2   12/09/24 1642 12/09/24 1642 12/09/24 1642 12/09/24 1722 12/09/24 1642   122/82 102 16 98.3 °F (36.8 °C) 96 %      MAP       --                Physical Exam    Nursing note and vitals reviewed.  Constitutional: She appears well-developed and well-nourished.   Patient making incomprehensive noises, grinding teeth, and grunting.   HENT:   Head: Normocephalic and atraumatic.   Right Ear: External ear normal.   Left Ear: External ear normal.   Nose: Nose normal.   Eyes: Conjunctivae are normal. No scleral icterus.   Photophobia   Neck: Neck supple. No tracheal deviation present.   Normal range of motion.  Cardiovascular:  Normal rate, regular rhythm and normal heart sounds.           Pulmonary/Chest: Breath sounds normal. No respiratory distress.   Abdominal: Abdomen is soft. Bowel sounds are normal. There is no abdominal tenderness.   Peg tube in place with no leakage.   Musculoskeletal:      Cervical back: Normal range of motion and neck supple.     Skin: Skin is warm and dry.   Psychiatric: She has a normal mood and affect. Thought content normal.         ED Course   Procedures  Labs Reviewed   COMPREHENSIVE METABOLIC PANEL - Abnormal       Result Value    Sodium 151 (*)     Potassium 4.2      Chloride 119 (*)     CO2 22 (*)     Glucose 80      BUN 19      Creatinine 0.8      Calcium 9.8      Total Protein 7.9      Albumin 3.9      Total Bilirubin 0.2      Alkaline Phosphatase 74      AST 23      ALT 35      eGFR >60      Anion Gap 10      URINALYSIS, REFLEX TO URINE CULTURE - Abnormal    Specimen UA Urine, Clean Catch      Color, UA Yellow      Appearance, UA Clear      pH, UA 8.0      Specific Gravity, UA 1.020      Protein, UA Negative      Glucose, UA Trace (*)     Ketones, UA Negative      Bilirubin (UA) Negative      Occult Blood UA Trace (*)     Nitrite, UA Negative      Urobilinogen, UA Negative      Leukocytes, UA 1+ (*)     Narrative:     Specimen Source->Urine   URINALYSIS MICROSCOPIC - Abnormal    RBC, UA 10 (*)     WBC, UA 4      Microscopic Comment SEE COMMENT      Narrative:     Specimen Source->Urine   POCT GLUCOSE - Abnormal    POCT Glucose 55 (*)    POCT GLUCOSE MONITORING CONTINUOUS - Abnormal    POC Glucose 165 (*)    POCT GLUCOSE - Abnormal    POCT Glucose 165 (*)    CBC W/ AUTO DIFFERENTIAL    WBC 6.34      RBC 4.97      Hemoglobin 14.6      Hematocrit 44.1      MCV 89      MCH 29.4      MCHC 33.1      RDW 12.1      Platelets 181      MPV 12.2      Immature Granulocytes 0.2      Gran # (ANC) 3.4      Immature Grans (Abs) 0.01      Lymph # 2.1      Mono # 0.7      Eos # 0.1      Baso # 0.02      nRBC 0      Gran % 53.3      Lymph % 33.0      Mono % 11.5      Eosinophil % 1.7      Basophil % 0.3      Differential Method Automated     LIPASE    Lipase 46     MAGNESIUM    Magnesium 2.2     POCT GLUCOSE    POCT Glucose 93     POCT GLUCOSE MONITORING CONTINUOUS   POCT GLUCOSE MONITORING CONTINUOUS          Imaging Results              CT Head Without Contrast (Final result)  Result time 12/09/24 19:15:43      Final result by Antonella Graf MD (12/09/24 19:15:43)                   Impression:      No acute intracranial abnormality.  No significant change.      Electronically signed by: Antonella Graf  Date:    12/09/2024  Time:    19:15               Narrative:    EXAMINATION:  CT OF THE HEAD WITHOUT    CLINICAL HISTORY:  Cerebral palsy, lethargy, vomiting;    TECHNIQUE:  5 mm unenhanced axial images were obtained from the  skull base to the vertex.    COMPARISON:  12/03/2024 and 10/20/2020    FINDINGS:  There is redemonstration of ventriculomegaly left greater than right and colpocephaly.  There is no acute intracranial hemorrhage, territorial infarct or mass effect, or midline shift. The visualized paranasal sinuses and mastoid air cells are clear.  Nonacute depressed fractures at the anterior frontal sinus are again seen.                                       X-Ray Chest AP Portable (Final result)  Result time 12/09/24 19:22:40      Final result by Vinny Lanza MD (12/09/24 19:22:40)                   Impression:      1. Interstitial findings may reflect mild edema.  Correlation is advised.  2. Large amount of gas and stool within the colon.      Electronically signed by: Vinny Lanza MD  Date:    12/09/2024  Time:    19:22               Narrative:    EXAMINATION:  XR CHEST AP PORTABLE    CLINICAL HISTORY:  vomiting;    TECHNIQUE:  Single frontal view of the chest was performed.    COMPARISON:  12/03/2024    FINDINGS:  The cardiomediastinal silhouette is not enlarged noting elevation of the right hemidiaphragm..  There is no pleural effusion.  The trachea is midline.  The lungs are symmetrically expanded bilaterally with coarse interstitial attenuation bilaterally, may reflect edema noting shallow inspiratory effort..  No large focal consolidation seen.  There is no pneumothorax.  The osseous structures there is a large amount of gas and stool within the colon..                                       XR Gastric tube check, non-radiologist performed (Final result)  Result time 12/09/24 19:58:42   Procedure changed from XR NG/OG tube placement check, non-radiologist performed     Final result by Antonella Graf MD (12/09/24 19:58:42)                   Impression:      No extravasated extraluminal contrast.      Electronically signed by: Antonella Graf  Date:    12/09/2024  Time:    19:58               Narrative:     EXAMINATION:  XR NON RADIOLOGIST PERFORMED NG/GASTRIC TUBE CHECK    CLINICAL HISTORY:  peg tube;    TECHNIQUE:  A nasogastric/gastric tube check was performed by a non radiologist.  AP abdomen supine  image was obtained.  30 cc of Gastroview was injected via G-tube.    COMPARISON:  12/05/2024    FINDINGS:  Contrast is seen in the stomach and jejunum.  No extraluminal contrast is appreciated.                                       Medications   dextrose 10% bolus 250 mL 250 mL (0 mLs Intravenous Stopped 12/9/24 1923)   ondansetron injection 4 mg (4 mg Intravenous Given 12/9/24 1821)   diatrizoate meglumineand-diatrizoate sodium (GASTROVIEW) solution 30 mL (30 mLs Per G Tube Given 12/9/24 1828)   sodium chloride 0.9% bolus 1,000 mL 1,000 mL (0 mLs Intravenous Stopped 12/9/24 2209)     Medical Decision Making  Amount and/or Complexity of Data Reviewed  Independent Historian: parent     Details: See hpi.  Labs: ordered. Decision-making details documented in ED Course.  Radiology: ordered. Decision-making details documented in ED Course.    Risk  Prescription drug management.            Scribe Attestation:   Scribe #1: I performed the above scribed service and the documentation accurately describes the services I performed. I attest to the accuracy of the note.        ED Course as of 12/10/24 0107   Mon Dec 09, 2024   1925 CT Head Without Contrast  FINDINGS:  There is redemonstration of ventriculomegaly left greater than right and colpocephaly.  There is no acute intracranial hemorrhage, territorial infarct or mass effect, or midline shift. The visualized paranasal sinuses and mastoid air cells are clear.  Nonacute depressed fractures at the anterior frontal sinus are again seen.     Impression:     No acute intracranial abnormality.  No significant change.   [CC]   2006 XR Gastric tube check, non-radiologist performed    Impression:     No extravasated extraluminal contrast.   [CC]   2148 Sodium(!): 151  History of  chronic hyponatremia typically due to volume depletion. [CC]   Tue Dec 10, 2024   0105 Patient with a history of cerebral palsy, chronic hypernatremia presents to the emergency department for evaluation of nausea, vomiting.  Patient had her baseline per father at bedside mentally.  Vitals have been stable she is afebrile.  UA isn't indicative of UTI.  Initial point of care glucose, 55, potentially erroneous as initial blood draw with a glucose of 80.  Patient was treated with a 10% bolus.  Glucose is now stabilized.  Electrolytes within normal limits except for hypernatremia.  Patient fluid resuscitated that she did appear dry on exam.  Kidney function liver function unremarkable.  No anemia noted.  Platelet count normal, no leukocytosis, patient afebrile, doubt infectious etiology.  Peg tube is in appropriate position.  Father was very concerned that it was malpositioned.  Chest x-ray without evidence of pneumonia or other findings.  CT of the head negative for any acute intracranial abnormalities.  Plan to discharge with Zofran.  Patient continues to be at base line.  Strict return precautions given. I believe patient is appropriate for discharge and continued outpatient evaulation/treatment.  I discussed with the patient/family the diagnosis, treatment plan, indications for return to the emergency department, and for expected follow-up. The patient/family verbalized an understanding. The patient/family  asked if there are any questions or concerns. We discuss the case, until all issues are addressed to the patient/family's satisfaction. Patient/family understands and is agreeable to the plan. Patient is stable and ready for discharge.   [CC]      ED Course User Index  [CC] Alexis Guzman MD I, Clifford Cranford, MD, personally performed the services described in this documentation. All medical record entries made by the scribe were at my direction and in my presence. I have reviewed the  chart and agree that the record reflects my personal performance and is accurate and complete.      DISCLAIMER: This note was prepared with [employer] voice recognition transcription software. Garbled syntax, mangled pronouns, and other bizarre constructions may be attributed to that software system.       Clinical Impression:  Final diagnoses:  [R11.2] Nausea and vomiting, unspecified vomiting type (Primary)  [E16.2] Hypoglycemia  [E87.0] Chronic hypernatremia          ED Disposition Condition    Discharge Stable          ED Prescriptions       Medication Sig Dispense Start Date End Date Auth. Provider    ondansetron (ZOFRAN) 4 mg/5 mL solution Take 5 mLs (4 mg total) by mouth every 6 (six) hours as needed for Nausea (Vomiting). 50 mL 12/10/2024 -- Alexis Guzman MD          Follow-up Information       Follow up With Specialties Details Why Contact Info    Cristóbal Krishnan MD Pediatrics Schedule an appointment as soon as possible for a visit in 3 days  [address] [place] [address]  [id number]  [place] [address]  [phone]      [facility] - Emergency Dept Emergency Medicine Go to  If symptoms worsen [id number] [facility] [place] [address] [phone]             Alexis Guzman MD  12/10/24 0101

## 2024-12-10 VITALS
DIASTOLIC BLOOD PRESSURE: 57 MMHG | BODY MASS INDEX: 30.09 KG/M2 | TEMPERATURE: 98 F | OXYGEN SATURATION: 96 % | SYSTOLIC BLOOD PRESSURE: 100 MMHG | WEIGHT: 130 LBS | RESPIRATION RATE: 18 BRPM | HEIGHT: 55 IN | HEART RATE: 66 BPM

## 2024-12-10 LAB
BILIRUB UR QL STRIP: NEGATIVE
CLARITY UR: CLEAR
COLOR UR: YELLOW
GLUCOSE UR QL STRIP: ABNORMAL
HGB UR QL STRIP: ABNORMAL
KETONES UR QL STRIP: NEGATIVE
LEUKOCYTE ESTERASE UR QL STRIP: ABNORMAL
MICROSCOPIC COMMENT: ABNORMAL
NITRITE UR QL STRIP: NEGATIVE
PH UR STRIP: 8 [PH] (ref 5–8)
POCT GLUCOSE: 165 MG/DL (ref 70–110)
POCT GLUCOSE: 93 MG/DL (ref 70–110)
PROT UR QL STRIP: NEGATIVE
RBC #/AREA URNS HPF: 10 /HPF (ref 0–4)
SP GR UR STRIP: 1.02 (ref 1–1.03)
URN SPEC COLLECT METH UR: ABNORMAL
UROBILINOGEN UR STRIP-ACNC: NEGATIVE EU/DL
WBC #/AREA URNS HPF: 4 /HPF (ref 0–5)

## 2024-12-10 PROCEDURE — 81000 URINALYSIS NONAUTO W/SCOPE: CPT | Performed by: STUDENT IN AN ORGANIZED HEALTH CARE EDUCATION/TRAINING PROGRAM

## 2024-12-10 RX ORDER — ONDANSETRON HYDROCHLORIDE 4 MG/5ML
4 SOLUTION ORAL EVERY 6 HOURS PRN
Qty: 50 ML | Refills: 0 | Status: SHIPPED | OUTPATIENT
Start: 2024-12-10

## 2024-12-10 NOTE — ED NOTES
Attempted straight cath with Rosi RN, pts father at bedside, unable to get urine sample, pt taken to CT, charge nurse/supervisor aware

## 2025-02-21 ENCOUNTER — OFFICE VISIT (OUTPATIENT)
Dept: GASTROENTEROLOGY | Facility: CLINIC | Age: 35
End: 2025-02-21
Payer: MEDICAID

## 2025-02-21 VITALS — WEIGHT: 105 LBS | BODY MASS INDEX: 24.4 KG/M2

## 2025-02-21 DIAGNOSIS — Z93.1 S/P PERCUTANEOUS ENDOSCOPIC GASTROSTOMY (PEG) TUBE PLACEMENT: ICD-10-CM

## 2025-02-21 DIAGNOSIS — G80.9 CEREBRAL PALSY, UNSPECIFIED TYPE: Primary | ICD-10-CM

## 2025-02-21 PROCEDURE — 99213 OFFICE O/P EST LOW 20 MIN: CPT | Mod: PBBFAC

## 2025-02-21 RX ORDER — MAGNESIUM HYDROXIDE 1200 MG/15ML
1 LIQUID ORAL ONCE
COMMUNITY

## 2025-02-21 RX ORDER — LACTOSE-REDUCED FOOD/FIBER
LIQUID (ML) ORAL
COMMUNITY

## 2025-02-21 NOTE — PROGRESS NOTES
[facility] Consultation Note    Reason for Consult:  The primary encounter diagnosis was Cerebral palsy, unspecified type. A diagnosis of S/P percutaneous endoscopic gastrostomy (PEG) tube placement was also pertinent to this visit.    PCP:   Cristóbal Krishnan   [address] [place] [address] [id number] / [place] [id number]    Referring MD:  Cristóbal Krishnan Md  [address] [place] [address]  [id number]  [place] [id number]    HPI:  This is a 34 y.o. female with H/o ADHD, ECTOR, epilepsy, cerebral palsy, chronic hypernatremia and encephalopathy, here with caregiver to establish care. Pt has peg tube in place. No oral intake. Reports having someone who takes care of tube and nutrition, but needed to see GI for f/u. She was seen in ED 2.5 months ago for N/V. At the time, tube was in appropriate position and xray revealed large amount of colonic stool and gas. Caregiver reports she's given lactulose daily and has a BM every other day. She also receives fleet enemas as needed if she goes 3-4 days without a BM. Caregiver with no concerns today.  She denies N/V, bloody stools, rectal bleeding, or melena.     Objective Findings:    Vital Signs:  Wt 47.6 kg (105 lb)   BMI 24.40 kg/m²   Body mass index is 24.4 kg/m².    Physical Exam:  General Appearance: Well appearing in no acute distress  Abdomen: PEG tube in place. No sign of erythema, irritation, swelling, or leakage.     I have personally reviewed labs and imaging results.     CXR AP Portable (12/09/2024):  1. Interstitial findings may reflect mild edema.  Correlation is advised.  2. Large amount of gas and stool within the colon.    XR Gastric tube check (12/09/2024):  Impression: No extravasated extraluminal contrast.     Assessment:  1. Cerebral palsy, unspecified type    2. S/P percutaneous endoscopic gastrostomy (PEG) tube placement      This is a 34 y.o F with h/o cerebral palsy with peg tube here with caregiver for annual GI evaluation. No concerns today. Peg tube  without any issues or signs of infection. Takes lactuloe daily and enemas as needed.     RTC as needed    Thank you so much for allowing me to participate in the care of Jordan E Dorsey Sarah Abukhader, PA-C [facility]

## 2025-04-27 ENCOUNTER — HOSPITAL ENCOUNTER (EMERGENCY)
Facility: HOSPITAL | Age: 35
Discharge: HOME | End: 2025-04-27
Attending: EMERGENCY MEDICINE
Payer: MEDICAID

## 2025-04-27 VITALS
WEIGHT: 106 LBS | DIASTOLIC BLOOD PRESSURE: 78 MMHG | HEART RATE: 52 BPM | RESPIRATION RATE: 16 BRPM | SYSTOLIC BLOOD PRESSURE: 112 MMHG | OXYGEN SATURATION: 100 % | TEMPERATURE: 98 F | BODY MASS INDEX: 24.64 KG/M2

## 2025-04-27 DIAGNOSIS — Z46.59 ENCOUNTER FOR FEEDING TUBE PLACEMENT: Primary | ICD-10-CM

## 2025-04-27 PROCEDURE — 43762 RPLC GTUBE NO REVJ TRC: CPT

## 2025-04-27 PROCEDURE — 99283 EMERGENCY DEPT VISIT LOW MDM: CPT | Mod: 25

## 2025-04-27 PROCEDURE — 25500020 PHARM REV CODE 255: Performed by: EMERGENCY MEDICINE

## 2025-04-27 RX ORDER — DIATRIZOATE MEGLUMINE AND DIATRIZOATE SODIUM 660; 100 MG/ML; MG/ML
50 SOLUTION ORAL; RECTAL
Status: COMPLETED | OUTPATIENT
Start: 2025-04-27 | End: 2025-04-27

## 2025-04-27 RX ADMIN — DIATRIZOATE MEGLUMINE AND DIATRIZOATE SODIUM 50 ML: 600; 100 SOLUTION ORAL; RECTAL at 08:04

## 2025-04-27 NOTE — ED PROVIDER NOTES
Called pt and left VM to reschedule physical  Ok to xfer to [place] in office   Encounter Date: 4/27/2025       History     Chief Complaint   Patient presents with    G-Tube Displacement      Pt reports to ED via EMS for PEG tube dislodgement. Unknown amount of time for displacement.      34-year-old female with micro encephalopathy who lives in a long-term care facility comes in for having her PEG tube pulled out.  No other concerns.  Unsure when the PEG tube came out they were able to put a [place] catheter back into the ostomy.  History given by caregiver who accompanied the patient.      Review of patient's allergies indicates:  No Known Allergies  Past Medical History:   Diagnosis Date    ADHD (attention deficit hyperactivity disorder)     Blood transfusion     Cerebral palsy     Congenital hip dislocation     Encephalopathy     Infantile myoclonic epilepsy     Iron deficiency anemia     Mental retardation     Pneumonia, aspiration     Seizures     Spina bifida aperta      Past Surgical History:   Procedure Laterality Date    ABDOMINAL SURGERY      BRAIN SURGERY      GASTROSTOMY      PEG tube    HIP SURGERY      JOINT REPLACEMENT       No family history on file.  Social History[1]  Review of Systems    Physical Exam     Initial Vitals [04/27/25 0725]   BP Pulse Resp Temp SpO2   112/78 (!) 52 16 98 °F (36.7 °C) 100 %      MAP       --         Physical Exam    Nursing note and vitals reviewed.  Constitutional: She appears well-nourished.   Cardiovascular:  Normal rate, regular rhythm and normal heart sounds.           Pulmonary/Chest: Breath sounds normal. No respiratory distress. She has no wheezes. She has no rhonchi. She has no rales.   Abdominal: She exhibits no distension. There is no abdominal tenderness. There is no rebound.     Neurological: She is alert.   Patient makes sounds only.  Baseline mental status per caregiver.   Skin:   Fourteen Nigerien Camarillo catheter currently in the ostomy.  No balloon.  This was pulled out.  The facility brought a PEG tube with them.  It is a 12 Nigerien  "PEG tube.  I put this back in without complication.  5 cc balloon filled with saline.  Caregiver then called the facility and the facility stated they would like a bigger 1 if possible.  Changed out to an 18 Nepali.  This required dilatation with 14 16 then 18 coude Camar[place] catheters.  Minimal bleeding at the site.  Gastric contents back out.  20 cc balloon filled.         ED Course   Feeding Tube    Date/Time: 4/27/2025 7:51 AM  Location procedure was performed: [facility] EMERGENCY DEPARTMENT    Performed by: Tino Romero MD  Authorized by: Tino Romero MD  Consent: Verbal consent obtained  Risks and benefits: risks, benefits and alternatives were discussed  Consent given by: Caregiver.  Patient identity confirmed: arm band and hospital-assigned identification number  Time out: Immediately prior to procedure a "time out" was called to verify the correct patient, procedure, equipment, support staff and site/side marked as required.  Indications: tube dislodged  Preparation: Patient was prepped and draped in the usual sterile fashion.    Sedation:  Patient sedated: no    Local anesthesia used: no    Anesthesia:  Local anesthesia used: no  Tube type: gastrostomy  Patient position: supine  Procedure type: replacement  Tube size: 18 Fr  Endoscope used: no  Bulb inflation volume: 20 (ml)  Bulb inflation fluid: normal saline  Placement/position confirmation: gastric contents aspirated and x-ray  Tube placement difficulty: moderate  Complications: No  Estimated blood loss (mL): 1  Patient tolerance: patient tolerated the procedure well with no immediate complications        Labs Reviewed - No data to display       Imaging Results              XR Gastric tube check, non-radiologist performed (Final result)  Result time 04/27/25 09:14:42      Final result by Dylan Felton MD (04/27/25 09:14:42)                   Impression:      Gastrostomy tube with the tip appropriately positioned within the body of the " stomach.    Unremarkable intestinal gas pattern.      Electronically signed by: Dylan Felton MD  Date:    04/27/2025  Time:    09:14               Narrative:    EXAMINATION:  XR GASTRIC TUBE CHECK, NON-RADIOLOGIST PERFORMED    CLINICAL HISTORY:  replacement- please use Gastrografin;    TECHNIQUE:  Two views of the abdomen were obtained.  Images were obtained prior to and following the administration of 50 mL of Gastroview through the patient's gastrostomy tube.    COMPARISON:  None    FINDINGS:  The patient has a gastrostomy tube with the tip within the body of the stomach.  The balloon is inflated.  Injected contrast material is noted within the stomach and proximal small bowel with no extravasated contrast identified.  There is a nonobstructive intestinal gas pattern.  Bony structures appear grossly intact.                                       Medications   diatrizoate meglumineand-diatrizoate sodium (GASTROVIEW) solution 50 mL (50 mLs Per G Tube Given 4/27/25 0832)     Medical Decision Making  Amount and/or Complexity of Data Reviewed  Radiology: ordered.    Risk  Prescription drug management.    Due to dilatation will get Gastrografin study to confirm placement.  X-ray confirms good placement.                                  Clinical Impression:  Final diagnoses:  [Z46.59] Encounter for feeding tube placement (Primary)          ED Disposition Condition    Discharge Stable          ED Prescriptions    None       Follow-up Information       Follow up With Specialties Details Why Contact Info    Cristóbal Krishnan MD Pediatrics   [address] [place] [address]  [id number]  [place] [id number] [phone]      [facility] - Emergency Dept Emergency Medicine  As needed [address] [facility] [place] [address] [phone]                 [1]   Social History  Tobacco Use    Smoking status: Never    Smokeless tobacco: Never   Substance Use Topics    Alcohol use: No    Drug use:  No        Tino Romero MD  04/27/25 0919

## 2025-04-27 NOTE — ED TRIAGE NOTES
Pt BIB EMS w/ caregiver from [facility] for evaluation and replacement of PEG tube. Caregiver unsure of what time tube was dislodged by pt. Caregiver reports no other symptoms or concerns at this time. Pmhx of mental retardation, cerebral palsy, seizures, encephalopathy, and spina bifida aperta. Caregiver brought 12 fr. Tube for replacement with them.